# Patient Record
Sex: FEMALE | Race: BLACK OR AFRICAN AMERICAN | NOT HISPANIC OR LATINO | Employment: UNEMPLOYED | URBAN - METROPOLITAN AREA
[De-identification: names, ages, dates, MRNs, and addresses within clinical notes are randomized per-mention and may not be internally consistent; named-entity substitution may affect disease eponyms.]

---

## 2022-05-16 ENCOUNTER — HOSPITAL ENCOUNTER (INPATIENT)
Facility: HOSPITAL | Age: 47
LOS: 2 days | Discharge: HOME | DRG: 291 | End: 2022-05-19
Attending: EMERGENCY MEDICINE | Admitting: INTERNAL MEDICINE
Payer: MEDICARE

## 2022-05-16 DIAGNOSIS — I25.10 ASCVD (ARTERIOSCLEROTIC CARDIOVASCULAR DISEASE): ICD-10-CM

## 2022-05-16 DIAGNOSIS — R06.02 SHORTNESS OF BREATH: ICD-10-CM

## 2022-05-16 DIAGNOSIS — I50.9 CHF (CONGESTIVE HEART FAILURE): ICD-10-CM

## 2022-05-16 DIAGNOSIS — R07.9 CHEST PAIN: ICD-10-CM

## 2022-05-16 LAB
ALBUMIN SERPL BCP-MCNC: 2.3 G/DL (ref 3.5–5.2)
ALP SERPL-CCNC: 70 U/L (ref 55–135)
ALT SERPL W/O P-5'-P-CCNC: 13 U/L (ref 10–44)
ANION GAP SERPL CALC-SCNC: 7 MMOL/L (ref 8–16)
AST SERPL-CCNC: 16 U/L (ref 10–40)
BASOPHILS # BLD AUTO: 0.08 K/UL (ref 0–0.2)
BASOPHILS NFR BLD: 1.2 % (ref 0–1.9)
BILIRUB SERPL-MCNC: 0.3 MG/DL (ref 0.1–1)
BNP SERPL-MCNC: 194 PG/ML (ref 0–99)
BUN SERPL-MCNC: 10 MG/DL (ref 6–20)
CALCIUM SERPL-MCNC: 8.5 MG/DL (ref 8.7–10.5)
CHLORIDE SERPL-SCNC: 107 MMOL/L (ref 95–110)
CO2 SERPL-SCNC: 25 MMOL/L (ref 23–29)
CREAT SERPL-MCNC: 0.8 MG/DL (ref 0.5–1.4)
D DIMER PPP IA.FEU-MCNC: 0.74 MG/L FEU
DIFFERENTIAL METHOD: ABNORMAL
EOSINOPHIL # BLD AUTO: 0.1 K/UL (ref 0–0.5)
EOSINOPHIL NFR BLD: 2.1 % (ref 0–8)
ERYTHROCYTE [DISTWIDTH] IN BLOOD BY AUTOMATED COUNT: 12 % (ref 11.5–14.5)
EST. GFR  (AFRICAN AMERICAN): >60 ML/MIN/1.73 M^2
EST. GFR  (NON AFRICAN AMERICAN): >60 ML/MIN/1.73 M^2
GLUCOSE SERPL-MCNC: 198 MG/DL (ref 70–110)
HCT VFR BLD AUTO: 35.4 % (ref 37–48.5)
HGB BLD-MCNC: 11.9 G/DL (ref 12–16)
IMM GRANULOCYTES # BLD AUTO: 0.02 K/UL (ref 0–0.04)
IMM GRANULOCYTES NFR BLD AUTO: 0.3 % (ref 0–0.5)
LYMPHOCYTES # BLD AUTO: 3.2 K/UL (ref 1–4.8)
LYMPHOCYTES NFR BLD: 48.3 % (ref 18–48)
MCH RBC QN AUTO: 29.9 PG (ref 27–31)
MCHC RBC AUTO-ENTMCNC: 33.6 G/DL (ref 32–36)
MCV RBC AUTO: 89 FL (ref 82–98)
MONOCYTES # BLD AUTO: 0.4 K/UL (ref 0.3–1)
MONOCYTES NFR BLD: 5.7 % (ref 4–15)
NEUTROPHILS # BLD AUTO: 2.8 K/UL (ref 1.8–7.7)
NEUTROPHILS NFR BLD: 42.4 % (ref 38–73)
NRBC BLD-RTO: 0 /100 WBC
PLATELET # BLD AUTO: 283 K/UL (ref 150–450)
PMV BLD AUTO: 10.8 FL (ref 9.2–12.9)
POCT GLUCOSE: 144 MG/DL (ref 70–110)
POTASSIUM SERPL-SCNC: 3.5 MMOL/L (ref 3.5–5.1)
PROT SERPL-MCNC: 5.7 G/DL (ref 6–8.4)
RBC # BLD AUTO: 3.98 M/UL (ref 4–5.4)
SARS-COV-2 RDRP RESP QL NAA+PROBE: NEGATIVE
SODIUM SERPL-SCNC: 139 MMOL/L (ref 136–145)
TROPONIN I SERPL DL<=0.01 NG/ML-MCNC: 0.01 NG/ML (ref 0–0.03)
TROPONIN I SERPL DL<=0.01 NG/ML-MCNC: 0.01 NG/ML (ref 0–0.03)
WBC # BLD AUTO: 6.64 K/UL (ref 3.9–12.7)

## 2022-05-16 PROCEDURE — 96375 TX/PRO/DX INJ NEW DRUG ADDON: CPT

## 2022-05-16 PROCEDURE — 82962 GLUCOSE BLOOD TEST: CPT

## 2022-05-16 PROCEDURE — G0378 HOSPITAL OBSERVATION PER HR: HCPCS

## 2022-05-16 PROCEDURE — 25000003 PHARM REV CODE 250: Performed by: EMERGENCY MEDICINE

## 2022-05-16 PROCEDURE — 36415 COLL VENOUS BLD VENIPUNCTURE: CPT | Performed by: INTERNAL MEDICINE

## 2022-05-16 PROCEDURE — U0002 COVID-19 LAB TEST NON-CDC: HCPCS | Performed by: EMERGENCY MEDICINE

## 2022-05-16 PROCEDURE — 93010 EKG 12-LEAD: ICD-10-PCS | Mod: ,,, | Performed by: GENERAL PRACTICE

## 2022-05-16 PROCEDURE — 96372 THER/PROPH/DIAG INJ SC/IM: CPT | Performed by: INTERNAL MEDICINE

## 2022-05-16 PROCEDURE — 25000003 PHARM REV CODE 250: Performed by: INTERNAL MEDICINE

## 2022-05-16 PROCEDURE — 80053 COMPREHEN METABOLIC PANEL: CPT | Performed by: NURSE PRACTITIONER

## 2022-05-16 PROCEDURE — 83880 ASSAY OF NATRIURETIC PEPTIDE: CPT | Performed by: NURSE PRACTITIONER

## 2022-05-16 PROCEDURE — 63600175 PHARM REV CODE 636 W HCPCS: Performed by: INTERNAL MEDICINE

## 2022-05-16 PROCEDURE — 84484 ASSAY OF TROPONIN QUANT: CPT | Performed by: NURSE PRACTITIONER

## 2022-05-16 PROCEDURE — 84484 ASSAY OF TROPONIN QUANT: CPT | Mod: 91 | Performed by: INTERNAL MEDICINE

## 2022-05-16 PROCEDURE — 96366 THER/PROPH/DIAG IV INF ADDON: CPT

## 2022-05-16 PROCEDURE — 93005 ELECTROCARDIOGRAM TRACING: CPT

## 2022-05-16 PROCEDURE — 99285 EMERGENCY DEPT VISIT HI MDM: CPT | Mod: 25

## 2022-05-16 PROCEDURE — 85025 COMPLETE CBC W/AUTO DIFF WBC: CPT | Performed by: NURSE PRACTITIONER

## 2022-05-16 PROCEDURE — 85379 FIBRIN DEGRADATION QUANT: CPT | Performed by: INTERNAL MEDICINE

## 2022-05-16 PROCEDURE — 96365 THER/PROPH/DIAG IV INF INIT: CPT

## 2022-05-16 PROCEDURE — 93010 ELECTROCARDIOGRAM REPORT: CPT | Mod: ,,, | Performed by: GENERAL PRACTICE

## 2022-05-16 RX ORDER — ATORVASTATIN CALCIUM 40 MG/1
80 TABLET, FILM COATED ORAL NIGHTLY
Status: DISCONTINUED | OUTPATIENT
Start: 2022-05-16 | End: 2022-05-19 | Stop reason: HOSPADM

## 2022-05-16 RX ORDER — ALPRAZOLAM 1 MG/1
1 TABLET ORAL 3 TIMES DAILY PRN
COMMUNITY
Start: 2021-12-10

## 2022-05-16 RX ORDER — ENOXAPARIN SODIUM 100 MG/ML
40 INJECTION SUBCUTANEOUS EVERY 24 HOURS
Status: DISCONTINUED | OUTPATIENT
Start: 2022-05-16 | End: 2022-05-19 | Stop reason: HOSPADM

## 2022-05-16 RX ORDER — POTASSIUM CHLORIDE 20 MEQ/1
60 TABLET, EXTENDED RELEASE ORAL ONCE
Status: COMPLETED | OUTPATIENT
Start: 2022-05-16 | End: 2022-05-16

## 2022-05-16 RX ORDER — POTASSIUM CHLORIDE 750 MG/1
10 CAPSULE, EXTENDED RELEASE ORAL DAILY
Status: ON HOLD | COMMUNITY
Start: 2022-04-13 | End: 2022-05-19 | Stop reason: HOSPADM

## 2022-05-16 RX ORDER — PREDNISOLONE ACETATE 10 MG/ML
1 SUSPENSION/ DROPS OPHTHALMIC 4 TIMES DAILY
COMMUNITY
Start: 2022-04-13

## 2022-05-16 RX ORDER — OXYCODONE HYDROCHLORIDE 5 MG/1
5 TABLET ORAL EVERY 4 HOURS PRN
Status: DISCONTINUED | OUTPATIENT
Start: 2022-05-16 | End: 2022-05-19 | Stop reason: HOSPADM

## 2022-05-16 RX ORDER — GLIPIZIDE 10 MG/1
10 TABLET ORAL 2 TIMES DAILY
COMMUNITY
Start: 2022-05-16

## 2022-05-16 RX ORDER — PANTOPRAZOLE SODIUM 40 MG/1
40 TABLET, DELAYED RELEASE ORAL DAILY
COMMUNITY
Start: 2022-05-15

## 2022-05-16 RX ORDER — OXCARBAZEPINE 150 MG/1
150 TABLET, FILM COATED ORAL 2 TIMES DAILY
COMMUNITY
Start: 2022-05-09

## 2022-05-16 RX ORDER — VALSARTAN 40 MG/1
160 TABLET ORAL 2 TIMES DAILY
Status: DISCONTINUED | OUTPATIENT
Start: 2022-05-16 | End: 2022-05-19 | Stop reason: HOSPADM

## 2022-05-16 RX ORDER — IBUPROFEN 200 MG
16 TABLET ORAL
Status: DISCONTINUED | OUTPATIENT
Start: 2022-05-16 | End: 2022-05-19 | Stop reason: HOSPADM

## 2022-05-16 RX ORDER — LABETALOL HYDROCHLORIDE 5 MG/ML
20 INJECTION, SOLUTION INTRAVENOUS
Status: COMPLETED | OUTPATIENT
Start: 2022-05-16 | End: 2022-05-16

## 2022-05-16 RX ORDER — FUROSEMIDE 20 MG/1
20 TABLET ORAL EVERY MORNING
Status: ON HOLD | COMMUNITY
Start: 2022-04-13 | End: 2022-05-19 | Stop reason: HOSPADM

## 2022-05-16 RX ORDER — CLONIDINE HYDROCHLORIDE 0.1 MG/1
0.1 TABLET ORAL
Status: COMPLETED | OUTPATIENT
Start: 2022-05-16 | End: 2022-05-16

## 2022-05-16 RX ORDER — GLUCAGON 1 MG
1 KIT INJECTION
Status: DISCONTINUED | OUTPATIENT
Start: 2022-05-16 | End: 2022-05-19 | Stop reason: HOSPADM

## 2022-05-16 RX ORDER — NALOXONE HCL 0.4 MG/ML
0.02 VIAL (ML) INJECTION
Status: DISCONTINUED | OUTPATIENT
Start: 2022-05-16 | End: 2022-05-19 | Stop reason: HOSPADM

## 2022-05-16 RX ORDER — QUETIAPINE FUMARATE 25 MG/1
25 TABLET, FILM COATED ORAL NIGHTLY
COMMUNITY
Start: 2022-05-09

## 2022-05-16 RX ORDER — FUROSEMIDE 10 MG/ML
40 INJECTION INTRAMUSCULAR; INTRAVENOUS ONCE
Status: COMPLETED | OUTPATIENT
Start: 2022-05-16 | End: 2022-05-16

## 2022-05-16 RX ORDER — BRIMONIDINE TARTRATE, TIMOLOL MALEATE 2; 5 MG/ML; MG/ML
SOLUTION/ DROPS OPHTHALMIC
COMMUNITY
Start: 2021-12-23

## 2022-05-16 RX ORDER — IBUPROFEN 200 MG
24 TABLET ORAL
Status: DISCONTINUED | OUTPATIENT
Start: 2022-05-16 | End: 2022-05-19 | Stop reason: HOSPADM

## 2022-05-16 RX ORDER — LAMOTRIGINE 50 MG/1
1 TABLET, EXTENDED RELEASE ORAL DAILY
COMMUNITY
Start: 2022-04-13

## 2022-05-16 RX ORDER — CLONIDINE HYDROCHLORIDE 0.1 MG/1
0.1 TABLET ORAL 2 TIMES DAILY
Status: ON HOLD | COMMUNITY
Start: 2022-04-13 | End: 2022-05-19 | Stop reason: HOSPADM

## 2022-05-16 RX ORDER — AMLODIPINE BESYLATE 5 MG/1
10 TABLET ORAL DAILY
Status: DISCONTINUED | OUTPATIENT
Start: 2022-05-17 | End: 2022-05-17

## 2022-05-16 RX ORDER — INSULIN DEGLUDEC 100 U/ML
INJECTION, SOLUTION SUBCUTANEOUS
COMMUNITY
Start: 2022-05-14

## 2022-05-16 RX ORDER — OMEPRAZOLE 20 MG/1
20 CAPSULE, DELAYED RELEASE ORAL DAILY
COMMUNITY
Start: 2022-03-01

## 2022-05-16 RX ORDER — MORPHINE SULFATE 4 MG/ML
3 INJECTION, SOLUTION INTRAMUSCULAR; INTRAVENOUS EVERY 4 HOURS PRN
Status: DISCONTINUED | OUTPATIENT
Start: 2022-05-16 | End: 2022-05-19 | Stop reason: HOSPADM

## 2022-05-16 RX ORDER — POLYETHYLENE GLYCOL 3350 17 G/17G
17 POWDER, FOR SOLUTION ORAL 2 TIMES DAILY PRN
Status: DISCONTINUED | OUTPATIENT
Start: 2022-05-16 | End: 2022-05-19 | Stop reason: HOSPADM

## 2022-05-16 RX ORDER — INSULIN ASPART 100 [IU]/ML
0-5 INJECTION, SOLUTION INTRAVENOUS; SUBCUTANEOUS
Status: DISCONTINUED | OUTPATIENT
Start: 2022-05-16 | End: 2022-05-19 | Stop reason: HOSPADM

## 2022-05-16 RX ORDER — CHOLECALCIFEROL (VITAMIN D3) 25 MCG
1000 TABLET ORAL DAILY
COMMUNITY
Start: 2022-03-01

## 2022-05-16 RX ORDER — BUSPIRONE HYDROCHLORIDE 7.5 MG/1
7.5 TABLET ORAL 3 TIMES DAILY
COMMUNITY
Start: 2022-05-09

## 2022-05-16 RX ORDER — NAPROXEN SODIUM 220 MG/1
324 TABLET, FILM COATED ORAL DAILY
Status: DISCONTINUED | OUTPATIENT
Start: 2022-05-16 | End: 2022-05-17

## 2022-05-16 RX ORDER — ACETAMINOPHEN 325 MG/1
650 TABLET ORAL EVERY 6 HOURS PRN
Status: DISCONTINUED | OUTPATIENT
Start: 2022-05-17 | End: 2022-05-19 | Stop reason: HOSPADM

## 2022-05-16 RX ORDER — LOSARTAN POTASSIUM 50 MG/1
100 TABLET ORAL DAILY
COMMUNITY
Start: 2022-03-01 | End: 2022-05-19

## 2022-05-16 RX ORDER — PRAVASTATIN SODIUM 20 MG/1
20 TABLET ORAL DAILY
COMMUNITY
Start: 2022-05-15 | End: 2022-05-19

## 2022-05-16 RX ORDER — AMLODIPINE BESYLATE 5 MG/1
5 TABLET ORAL DAILY
Status: ON HOLD | COMMUNITY
Start: 2022-05-15 | End: 2022-05-19 | Stop reason: HOSPADM

## 2022-05-16 RX ORDER — SITAGLIPTIN AND METFORMIN HYDROCHLORIDE 50; 500 MG/1; MG/1
1 TABLET, FILM COATED, EXTENDED RELEASE ORAL DAILY
Status: ON HOLD | COMMUNITY
Start: 2022-04-13 | End: 2022-05-19 | Stop reason: HOSPADM

## 2022-05-16 RX ORDER — NICARDIPINE HYDROCHLORIDE 0.2 MG/ML
0-15 INJECTION INTRAVENOUS CONTINUOUS
Status: DISCONTINUED | OUTPATIENT
Start: 2022-05-16 | End: 2022-05-18

## 2022-05-16 RX ADMIN — ENOXAPARIN SODIUM 40 MG: 100 INJECTION SUBCUTANEOUS at 06:05

## 2022-05-16 RX ADMIN — NICARDIPINE HYDROCHLORIDE 2.5 MG/HR: 0.2 INJECTION, SOLUTION INTRAVENOUS at 08:05

## 2022-05-16 RX ADMIN — ASPIRIN 81 MG CHEWABLE TABLET 324 MG: 81 TABLET CHEWABLE at 06:05

## 2022-05-16 RX ADMIN — ATORVASTATIN CALCIUM 80 MG: 40 TABLET, FILM COATED ORAL at 06:05

## 2022-05-16 RX ADMIN — LABETALOL HYDROCHLORIDE 20 MG: 5 INJECTION INTRAVENOUS at 05:05

## 2022-05-16 RX ADMIN — POTASSIUM CHLORIDE 60 MEQ: 1500 TABLET, EXTENDED RELEASE ORAL at 06:05

## 2022-05-16 RX ADMIN — FUROSEMIDE 40 MG: 10 INJECTION, SOLUTION INTRAMUSCULAR; INTRAVENOUS at 07:05

## 2022-05-16 RX ADMIN — VALSARTAN 160 MG: 80 TABLET, FILM COATED ORAL at 08:05

## 2022-05-16 RX ADMIN — CLONIDINE HYDROCHLORIDE 0.1 MG: 0.1 TABLET ORAL at 05:05

## 2022-05-16 NOTE — FIRST PROVIDER EVALUATION
Emergency Department TeleTriage Encounter Note      CHIEF COMPLAINT    Chief Complaint   Patient presents with    Leg Swelling     Arms tingling  / hand numbness started today        VITAL SIGNS   Initial Vitals [05/16/22 1529]   BP Pulse Resp Temp SpO2   (!) 222/99 79 18 99 °F (37.2 °C) 99 %      MAP       --            ALLERGIES    Review of patient's allergies indicates:   Allergen Reactions    Vancomycin analogues        PROVIDER TRIAGE NOTE  This is a teletriage evaluation of a 46 y.o. female presenting to the ED with c/o numbness and tingling in hands and fingers since this morning.  Swelling to BLE.  Pt states symptoms began a week ago.  Pt is compliant with her lasix.      PE: hypertensive on exam.  Steady gait noted.  Speaking in full sentences.     Plan: labs, imaging    All ED beds are full at present; patient notified of this status.  Patient seen and medically screened by Nurse Practitioner via teletriage.  Limited physical exam via telehealth: The patient is awake, alert, answering questions appropriately and is not in respiratory distress. Initial orders will be placed and care will be transferred to an alternate provider when patient is roomed for a full evaluation. Any additional orders and the final disposition will be determined by that provider.        ORDERS  Labs Reviewed   CBC W/ AUTO DIFFERENTIAL   COMPREHENSIVE METABOLIC PANEL   TROPONIN I   B-TYPE NATRIURETIC PEPTIDE       ED Orders (720h ago, onward)    Start Ordered     Status Ordering Provider    05/16/22 1536 05/16/22 1535  X-Ray Chest 1 View  1 time imaging         Ordered KRYSTLE REDD    05/16/22 1535 05/16/22 1534  Vital signs  Every 30 min         Ordered KRYSTLE REDD    05/16/22 1535 05/16/22 1534  Cardiac Monitoring - Adult  Continuous        Comments: Notify Physician If:    Ordered KRYSTLE REDD    05/16/22 1535 05/16/22 1534  Pulse Oximetry Continuous  Continuous         Ordered KRYSTLE REDD    05/16/22 1535  05/16/22 1534  Insert peripheral IV  Once         Ordered KRYSTLE REDDKala    05/16/22 1535 05/16/22 1534  CBC auto differential  STAT         Pending Collection KRYSTLE REDDKala    05/16/22 1535 05/16/22 1534  Comprehensive metabolic panel  STAT         Pending Collection KRYSTLE REDDKala    05/16/22 1535 05/16/22 1534  Troponin I  STAT         Pending Collection KRYSTLE REDDKala    05/16/22 1535 05/16/22 1534  Brain natriuretic peptide  STAT         Pending Collection KRYSTLE REDDKala    05/16/22 1535 05/16/22 1534  EKG 12-lead  Once         Ordered IVANIA KRYSTLE L.            Virtual Visit Note: The provider triage portion of this emergency department evaluation and documentation was performed via [employer], a HIPAA-compliant telemedicine application, in concert with a tele-presenter in the room. A face to face patient evaluation with one of my colleagues will occur once the patient is placed in an emergency department room.      DISCLAIMER: This note was prepared with [employer] voice recognition transcription software. Garbled syntax, mangled pronouns, and other bizarre constructions may be attributed to that software system.

## 2022-05-16 NOTE — H&P
History and Physical for Admission  [facility]      Mayra Davis (MRN: [id number])  Admitting Service: Hospital Medicine  Date of Admission: 5/16/2022   Primary Care Provider: No primary care provider on file.    ?  Chief complaint: chest pain   ?  History of Present Illness:     46-year-old female with history of CHF, diabetes presents with 1 week of worsening lower extremity edema ultimately developing chest pain on the day of admission found to be in decompensated heart failure with severely elevated blood pressure.    For 1 week the patient has been experiencing worsening lower extremity edema which she feels is worse on the right.  She has been compliant with her home medication regimen and her Lasix dose have not recently been changed.  On the day of admission she developed moderately severe substernal chest pain leading to her presentation.  No palpitations or lightheadedness.  No shortness of breath, fever or cough.  ?  ?  Review of Systems:   Constitutional: ; no fevers/chills, night sweats, weight changes, fatigue, recent illnesses,   Eyes: ; denies vision changes including recent decrease in vision, double vision or blurriness  Ears, Nose, Mouth, Throat: denies hearing abnormalities, tinnitus, rhinorrhea, sinus pain/congestion, dysphagia, sore throat, hoarseness, neck pain  Cardiovascular:  See HPI  Respiratory: ; denies dyspnea, cough, wheezing  GI: denies abdominal pain, nausea, vomiting, diarrhea, constipation, melena,   :  denies dysuria, hematuria, polyuria, incontinence  MSK: denies joint pain, weakness, myalgias  Integument:  denies rashes, lesions, skin changes  Neuro:  Headache denies weakness, paresthesia, tremor, dizziness  Psych: denies history of anxiety/depression  Endocrine: ; denies polyuria/polydipsia, polyphagia, heat/cold intolerance,  Hematologic/lymphatic:  denies easy bleeding/bruising, LAD    ?  Medical History  CHF, diabetes, hypertension, eye problems  Prior  "surgeries:  Hysterectomy,   Does not smoke drink or do drugs  Mother with history of cardiac disease  Allergic to vancomycin  ?  CURRENT OUTPATIENT MEDS  Allergies:   Review of patient's allergies indicates:   Allergen Reactions    Vancomycin analogues        ?  PHYSICAL EXAM  Vitals: BP (!) 216/94   Pulse 82   Temp 99 °F (37.2 °C) (Oral)   Resp 18   Ht 5' 4" (1.626 m)   Wt 77 kg (169 lb 12.1 oz)   SpO2 100%   BMI 29.14 kg/m²  Body mass index is 29.14 kg/m².    General: NAD, AO3  HEENT: PERRL, EOMI.   Cardiovascular: RRR  Respiratory:crackles at bases   GI: abdomen S/NT/ND, +BS  Extremities: 1+ edema on left, 2+ on right  MSK: moves upper extremities.   Neuro/Psych: A&OX3. CNII-XII grossly intact.      EKG and radiographs from the past 24h: reviewed and personally interpreted if available.      LABS    Recent Labs     22  1603   WBC 6.64   HGB 11.9*        ?  Recent Labs     22  1603      K 3.5   CO2 25   BUN 10   CREATININE 0.8     ?  Recent Labs     22  1603   BILITOT 0.3   AST 16   ALT 13   ALKPHOS 70   PROT 5.7*     ?  No results for input(s): INR, PT, PTT in the last 72 hours.  ?    ASSESSMENT & PLAN    46-year-old female with history of CHF, diabetes presents with 1 week of worsening lower extremity edema ultimately developing chest pain on the day of admission found to be in decompensated heart failure with severely elevated blood pressure  ?    1. Acute on chronic congestive heart failure  -type unknown  -Lasix  -echo    2. Chest pain  -likely secondary to severe hypertension  -trend troponins  -ASA, statin, prn morphine. Beta blocker currently contraindicated.     3. HTN:   -nicardipine drip  -valsartan 180 bid (home losartan)   -Continue home amlodipine in AM    4. DMII  -ISS  -AM A1C    DVT ppx: lovenox     Highly complex medical decision making, case discussed with ER physician, EKG reviewed.    Code Status/Dispo: Full Code.   ?  Nick Ocampo    Date: " 5/16/2022

## 2022-05-16 NOTE — ED NOTES
Dr. Ocampo @BS discussing POC and Admission. Pt  with pt. Pt AAOx4, Abc's intact. NADN. No adverse reaction to medication given.

## 2022-05-16 NOTE — ED PROVIDER NOTES
Encounter Date: 5/16/2022       History     Chief Complaint   Patient presents with    Leg Swelling     Arms tingling  / hand numbness started today      46-year-old female with a history of CHF hypertension diabetes on multiple medications presents with chest discomfort that began prior to arrival.  She also reports 1 week of leg swelling and bilateral tingling in the arms.  History of prior MI in 2009. She is compliant with all of her medications including losartan clonidine and Lasix.  Denies shortness of breath.  She reports the swelling extends from her ankles all the way to her abdomen.  No history of such significant swelling.    The history is provided by the patient.     Review of patient's allergies indicates:   Allergen Reactions    Vancomycin analogues      No past medical history on file.  No past surgical history on file.  No family history on file.     Review of Systems   Respiratory: Negative for shortness of breath.    Cardiovascular: Positive for chest pain and leg swelling.   All other systems reviewed and are negative.      Physical Exam     Initial Vitals [05/16/22 1529]   BP Pulse Resp Temp SpO2   (!) 222/99 79 18 99 °F (37.2 °C) 99 %      MAP       --         Physical Exam    Nursing note and vitals reviewed.  Constitutional: She appears well-developed and well-nourished. She is not diaphoretic. No distress.   HENT:   Head: Normocephalic and atraumatic.   Eyes: EOM are normal.   Neck: Neck supple.   Normal range of motion.  Cardiovascular: Normal rate, regular rhythm and normal heart sounds. Exam reveals no gallop and no friction rub.    No murmur heard.  Pulmonary/Chest: Breath sounds normal. No respiratory distress. She has no wheezes. She has no rhonchi. She has no rales.   Abdominal: Abdomen is soft. She exhibits no distension. There is no abdominal tenderness.   Musculoskeletal:         General: Normal range of motion.      Cervical back: Normal range of motion and neck supple.       Comments: 2+ edema lower extremities, tense and swollen     Neurological: She is alert and oriented to person, place, and time.   Skin: Skin is warm and dry.   Psychiatric: She has a normal mood and affect. Her behavior is normal. Judgment and thought content normal.         ED Course   Procedures  Labs Reviewed   CBC W/ AUTO DIFFERENTIAL - Abnormal; Notable for the following components:       Result Value    RBC 3.98 (*)     Hemoglobin 11.9 (*)     Hematocrit 35.4 (*)     Lymph % 48.3 (*)     All other components within normal limits   COMPREHENSIVE METABOLIC PANEL   TROPONIN I   B-TYPE NATRIURETIC PEPTIDE   SARS-COV-2 RNA AMPLIFICATION, QUAL          Imaging Results          X-Ray Chest 1 View (Final result)  Result time 05/16/22 16:03:16    Final result by Inocencia Diaz MD (05/16/22 16:03:16)                 Impression:      No acute cardiopulmonary disease      Electronically signed by: Inocencia Diaz MD  Date:    05/16/2022  Time:    16:03             Narrative:    EXAMINATION:  XR CHEST 1 VIEW    CLINICAL HISTORY:  Heart failure, unspecified    TECHNIQUE:  Single frontal view of the chest was performed.    COMPARISON:  11/26/2014    FINDINGS:  The cardiomediastinal silhouette is stable.  The lungs are clear of infiltrate.  There is no pleural effusion.                                 Medications   labetaloL injection 20 mg (has no administration in time range)     Medical Decision Making:   History:   Old Medical Records: I decided to obtain old medical records.  Old Records Summarized: records from clinic visits.       <> Summary of Records: Can see a few [place] records from 2015  Clinical Tests:   Lab Tests: Ordered and Reviewed  Radiological Study: Ordered and Reviewed  Medical Tests: Ordered and Reviewed             ED Course as of 05/16/22 1821   Mon May 16, 2022   1641 X-Ray Chest 1 View [EF]   1641 BP(!): 222/99 [EF]   1641 Temp: 99 °F (37.2 °C) [EF]   1641 Temp src: Oral [EF]   1641 Pulse: 79  [EF]   1641 Resp: 18 [EF]   1641 SpO2: 99 % [EF]   1650 Sinus rhythm 74 beats per minute normal axis no ST elevation.  LVH.  Poor R-wave progression independently interpreted no old EKGs. [EF]   1708 Troponin I: 0.015 [EF]   1708 BNP(!): 194 [EF]   1724 BP(!): 214/92 [EF]   1738 Dickert to admit for CP, leg swelling, HTN [EF]      ED Course User Index  [EF] Clark Rosenberg MD             Clinical Impression:   Final diagnoses:  [R06.02] Shortness of breath  [I50.9] CHF (congestive heart failure)          46-year-old female history of diabetes hypertension CHF possible MI presents with chest pressure earlier today.  Also complaining of 1 week of lower extremity edema which extends up to her hips.  Mild shortness of breath.  Do not think pulmonary embolus or aortic dissection.  BNP slightly elevated.  Patient was given IV labetalol in the emergency room for blood pressure with improvement.  [facility] to admit.        Clark Rosenberg MD  05/16/22 4385

## 2022-05-17 LAB
ANION GAP SERPL CALC-SCNC: 10 MMOL/L (ref 8–16)
BASOPHILS # BLD AUTO: 0.07 K/UL (ref 0–0.2)
BASOPHILS NFR BLD: 1.1 % (ref 0–1.9)
BUN SERPL-MCNC: 11 MG/DL (ref 6–20)
CALCIUM SERPL-MCNC: 8.4 MG/DL (ref 8.7–10.5)
CHLORIDE SERPL-SCNC: 108 MMOL/L (ref 95–110)
CO2 SERPL-SCNC: 24 MMOL/L (ref 23–29)
CREAT SERPL-MCNC: 0.9 MG/DL (ref 0.5–1.4)
DIFFERENTIAL METHOD: ABNORMAL
EOSINOPHIL # BLD AUTO: 0.1 K/UL (ref 0–0.5)
EOSINOPHIL NFR BLD: 1.6 % (ref 0–8)
ERYTHROCYTE [DISTWIDTH] IN BLOOD BY AUTOMATED COUNT: 11.9 % (ref 11.5–14.5)
EST. GFR  (AFRICAN AMERICAN): >60 ML/MIN/1.73 M^2
EST. GFR  (NON AFRICAN AMERICAN): >60 ML/MIN/1.73 M^2
ESTIMATED AVG GLUCOSE: 189 MG/DL (ref 68–131)
GLUCOSE SERPL-MCNC: 245 MG/DL (ref 70–110)
HBA1C MFR BLD: 8.2 % (ref 4–5.6)
HCT VFR BLD AUTO: 31.5 % (ref 37–48.5)
HGB BLD-MCNC: 10.4 G/DL (ref 12–16)
IMM GRANULOCYTES # BLD AUTO: 0.01 K/UL (ref 0–0.04)
IMM GRANULOCYTES NFR BLD AUTO: 0.2 % (ref 0–0.5)
LYMPHOCYTES # BLD AUTO: 2.9 K/UL (ref 1–4.8)
LYMPHOCYTES NFR BLD: 46.1 % (ref 18–48)
MAGNESIUM SERPL-MCNC: 1.5 MG/DL (ref 1.6–2.6)
MCH RBC QN AUTO: 29.4 PG (ref 27–31)
MCHC RBC AUTO-ENTMCNC: 33 G/DL (ref 32–36)
MCV RBC AUTO: 89 FL (ref 82–98)
MONOCYTES # BLD AUTO: 0.4 K/UL (ref 0.3–1)
MONOCYTES NFR BLD: 5.6 % (ref 4–15)
NEUTROPHILS # BLD AUTO: 2.9 K/UL (ref 1.8–7.7)
NEUTROPHILS NFR BLD: 45.4 % (ref 38–73)
NRBC BLD-RTO: 0 /100 WBC
PLATELET # BLD AUTO: 235 K/UL (ref 150–450)
PMV BLD AUTO: 11.1 FL (ref 9.2–12.9)
POCT GLUCOSE: 187 MG/DL (ref 70–110)
POCT GLUCOSE: 291 MG/DL (ref 70–110)
POCT GLUCOSE: 308 MG/DL (ref 70–110)
POTASSIUM SERPL-SCNC: 3.4 MMOL/L (ref 3.5–5.1)
RBC # BLD AUTO: 3.54 M/UL (ref 4–5.4)
SODIUM SERPL-SCNC: 142 MMOL/L (ref 136–145)
TROPONIN I SERPL DL<=0.01 NG/ML-MCNC: 0.02 NG/ML (ref 0–0.03)
TROPONIN I SERPL DL<=0.01 NG/ML-MCNC: 0.02 NG/ML (ref 0–0.03)
TROPONIN I SERPL DL<=0.01 NG/ML-MCNC: 0.03 NG/ML (ref 0–0.03)
WBC # BLD AUTO: 6.29 K/UL (ref 3.9–12.7)

## 2022-05-17 PROCEDURE — 83036 HEMOGLOBIN GLYCOSYLATED A1C: CPT | Performed by: INTERNAL MEDICINE

## 2022-05-17 PROCEDURE — 20000000 HC ICU ROOM

## 2022-05-17 PROCEDURE — 96366 THER/PROPH/DIAG IV INF ADDON: CPT

## 2022-05-17 PROCEDURE — 84484 ASSAY OF TROPONIN QUANT: CPT | Mod: 91 | Performed by: INTERNAL MEDICINE

## 2022-05-17 PROCEDURE — 25000003 PHARM REV CODE 250: Performed by: INTERNAL MEDICINE

## 2022-05-17 PROCEDURE — 85025 COMPLETE CBC W/AUTO DIFF WBC: CPT | Performed by: INTERNAL MEDICINE

## 2022-05-17 PROCEDURE — 80048 BASIC METABOLIC PNL TOTAL CA: CPT | Performed by: INTERNAL MEDICINE

## 2022-05-17 PROCEDURE — 94761 N-INVAS EAR/PLS OXIMETRY MLT: CPT

## 2022-05-17 PROCEDURE — 83735 ASSAY OF MAGNESIUM: CPT | Performed by: INTERNAL MEDICINE

## 2022-05-17 PROCEDURE — 63600175 PHARM REV CODE 636 W HCPCS: Performed by: INTERNAL MEDICINE

## 2022-05-17 PROCEDURE — 36415 COLL VENOUS BLD VENIPUNCTURE: CPT | Performed by: INTERNAL MEDICINE

## 2022-05-17 RX ORDER — MUPIROCIN 20 MG/G
OINTMENT TOPICAL 2 TIMES DAILY
Status: DISCONTINUED | OUTPATIENT
Start: 2022-05-17 | End: 2022-05-19 | Stop reason: HOSPADM

## 2022-05-17 RX ORDER — FUROSEMIDE 10 MG/ML
40 INJECTION INTRAMUSCULAR; INTRAVENOUS EVERY 8 HOURS
Status: COMPLETED | OUTPATIENT
Start: 2022-05-17 | End: 2022-05-17

## 2022-05-17 RX ORDER — POTASSIUM CHLORIDE 20 MEQ/1
60 TABLET, EXTENDED RELEASE ORAL EVERY 6 HOURS
Status: COMPLETED | OUTPATIENT
Start: 2022-05-17 | End: 2022-05-17

## 2022-05-17 RX ORDER — MAGNESIUM SULFATE HEPTAHYDRATE 40 MG/ML
2 INJECTION, SOLUTION INTRAVENOUS ONCE
Status: COMPLETED | OUTPATIENT
Start: 2022-05-17 | End: 2022-05-17

## 2022-05-17 RX ORDER — LANOLIN ALCOHOL/MO/W.PET/CERES
400 CREAM (GRAM) TOPICAL 2 TIMES DAILY
Status: COMPLETED | OUTPATIENT
Start: 2022-05-17 | End: 2022-05-17

## 2022-05-17 RX ORDER — ISOSORBIDE MONONITRATE 30 MG/1
30 TABLET, EXTENDED RELEASE ORAL DAILY
Status: DISCONTINUED | OUTPATIENT
Start: 2022-05-17 | End: 2022-05-19

## 2022-05-17 RX ORDER — POTASSIUM CHLORIDE 20 MEQ/1
40 TABLET, EXTENDED RELEASE ORAL EVERY 6 HOURS
Status: DISCONTINUED | OUTPATIENT
Start: 2022-05-17 | End: 2022-05-17

## 2022-05-17 RX ORDER — ASPIRIN 81 MG/1
81 TABLET ORAL DAILY
Status: DISCONTINUED | OUTPATIENT
Start: 2022-05-18 | End: 2022-05-19 | Stop reason: HOSPADM

## 2022-05-17 RX ADMIN — INSULIN ASPART 4 UNITS: 100 INJECTION, SOLUTION INTRAVENOUS; SUBCUTANEOUS at 12:05

## 2022-05-17 RX ADMIN — INSULIN ASPART 3 UNITS: 100 INJECTION, SOLUTION INTRAVENOUS; SUBCUTANEOUS at 05:05

## 2022-05-17 RX ADMIN — ENOXAPARIN SODIUM 40 MG: 100 INJECTION SUBCUTANEOUS at 04:05

## 2022-05-17 RX ADMIN — MUPIROCIN: 20 OINTMENT TOPICAL at 08:05

## 2022-05-17 RX ADMIN — OXYCODONE 5 MG: 5 TABLET ORAL at 08:05

## 2022-05-17 RX ADMIN — OXYCODONE 5 MG: 5 TABLET ORAL at 02:05

## 2022-05-17 RX ADMIN — POTASSIUM CHLORIDE 60 MEQ: 1500 TABLET, EXTENDED RELEASE ORAL at 05:05

## 2022-05-17 RX ADMIN — POTASSIUM CHLORIDE 60 MEQ: 1500 TABLET, EXTENDED RELEASE ORAL at 11:05

## 2022-05-17 RX ADMIN — ACETAMINOPHEN 650 MG: 325 TABLET ORAL at 12:05

## 2022-05-17 RX ADMIN — ACETAMINOPHEN 650 MG: 325 TABLET ORAL at 09:05

## 2022-05-17 RX ADMIN — ASPIRIN 81 MG CHEWABLE TABLET 324 MG: 81 TABLET CHEWABLE at 08:05

## 2022-05-17 RX ADMIN — Medication 400 MG: at 10:05

## 2022-05-17 RX ADMIN — INSULIN ASPART 4 UNITS: 100 INJECTION, SOLUTION INTRAVENOUS; SUBCUTANEOUS at 11:05

## 2022-05-17 RX ADMIN — ISOSORBIDE MONONITRATE 30 MG: 30 TABLET, EXTENDED RELEASE ORAL at 03:05

## 2022-05-17 RX ADMIN — MUPIROCIN: 20 OINTMENT TOPICAL at 10:05

## 2022-05-17 RX ADMIN — Medication 400 MG: at 08:05

## 2022-05-17 RX ADMIN — VALSARTAN 160 MG: 80 TABLET, FILM COATED ORAL at 08:05

## 2022-05-17 RX ADMIN — OXYCODONE 5 MG: 5 TABLET ORAL at 11:05

## 2022-05-17 RX ADMIN — FUROSEMIDE 40 MG: 10 INJECTION, SOLUTION INTRAMUSCULAR; INTRAVENOUS at 10:05

## 2022-05-17 RX ADMIN — MAGNESIUM SULFATE 2 G: 2 INJECTION INTRAVENOUS at 10:05

## 2022-05-17 RX ADMIN — AMLODIPINE BESYLATE 10 MG: 5 TABLET ORAL at 08:05

## 2022-05-17 RX ADMIN — NICARDIPINE HYDROCHLORIDE 5 MG/HR: 0.2 INJECTION, SOLUTION INTRAVENOUS at 02:05

## 2022-05-17 RX ADMIN — ATORVASTATIN CALCIUM 80 MG: 40 TABLET, FILM COATED ORAL at 08:05

## 2022-05-17 NOTE — PLAN OF CARE
Problem: Adult Inpatient Plan of Care  Goal: Plan of Care Review  Outcome: Ongoing, Progressing  Goal: Patient-Specific Goal (Individualized)  Outcome: Ongoing, Progressing  Goal: Absence of Hospital-Acquired Illness or Injury  Outcome: Ongoing, Progressing  Goal: Optimal Comfort and Wellbeing  Outcome: Ongoing, Progressing  Goal: Readiness for Transition of Care  Outcome: Ongoing, Progressing     Problem: Heart Failure Comorbidity  Goal: Maintenance of Heart Failure Symptom Control  Outcome: Ongoing, Progressing     Problem: Hypertension Comorbidity  Goal: Blood Pressure in Desired Range  Outcome: Ongoing, Progressing

## 2022-05-17 NOTE — EICU
EICU BRIEF ADMIT NOTE:    HISTORY: Please refer to H/P and ER notes for detail. No chest pain at this time    CAMERA ASSESSMENT: Two way audiovisual assessment was done: no distress    Telemetry was reviewed. Medical records including notes, labs and imaging were reviewed.    DISCUSSED with bedside nurse.    ASSESSMENT AND PLAN:    #.  Congestive heart failure, increased pedal edema.  Doppler of the lower extremities negative for DVT.  Echocardiogram ordered.  Continue Lasix.    # Chest pain.  No acute ST changes on EKG.  First troponin normal.  Continue to trend troponin.  Continue aspirin and statin.    # Hypertension.  On nicardipine infusion.  Continue to titrate.    BEST PRACTICES REVIEW:    GLYCEMIN CONTROL:  SSI  STRESS ULCER PROPHYLAXIS: not indicated  DVT PROPHYLAXIS:   Lovenox    Thank You for allowing EICU to participate in the care of the patient. Please call as needed      Edmund Valencia MD  [facility]  [phone]

## 2022-05-17 NOTE — PROGRESS NOTES
"Daily Progress Note  [facility]      Mayra Davis (MRN: [id number])  Admitting Service: Hospital Medicine  Date of Admission: 5/16/2022   Primary Care Provider: No primary care provider on file.    ?  Chief complaint: chest pain   ?  History of Present Illness:     46-year-old female with history of CHF, diabetes presents with 1 week of worsening lower extremity edema ultimately developing chest pain on the day of admission found to be in decompensated heart failure with severely elevated blood pressure.    For 1 week the patient has been experiencing worsening lower extremity edema which she feels is worse on the right.  She has been compliant with her home medication regimen and her Lasix dose have not recently been changed.  On the day of admission she developed moderately severe substernal chest pain leading to her presentation.  No palpitations or lightheadedness.  No shortness of breath, fever or cough.  ?  ?Subjective:   Chest discomfort improving. Not short of breath. Still with headache   ?  PHYSICAL EXAM  Vitals: BP (!) 159/68   Pulse 83   Temp 98.4 °F (36.9 °C) (Axillary)   Resp (!) 0   Ht 5' 4" (1.626 m)   Wt 73.5 kg (162 lb)   SpO2 100%   Breastfeeding No Comment: hysterectomy  BMI 27.81 kg/m²  Body mass index is 27.81 kg/m².    General: NAD, AO3  HEENT: PERRL, EOMI.   Cardiovascular: RRR  Respiratory:ctab  GI: abdomen S/NT/ND, +BS  Extremities: trace edema   MSK: moves upper extremities.   Neuro/Psych: A&OX3. CNII-XII grossly intact.      EKG and radiographs from the past 24h: reviewed and personally interpreted if available.      LABS    Recent Labs     05/17/22  0328   WBC 6.29   HGB 10.4*        ?  Recent Labs     05/17/22  0328      K 3.4*   CO2 24   BUN 11   CREATININE 0.9   MG 1.5*     ?  Recent Labs     05/16/22  1603   BILITOT 0.3   AST 16   ALT 13   ALKPHOS 70   PROT 5.7*     ?  No results for input(s): INR, PT, PTT in the last 72 hours.  ?    ASSESSMENT & " PLAN    46-year-old female with history of CHF, diabetes presents with 1 week of worsening lower extremity edema ultimately developing chest pain on the day of admission found to be in decompensated heart failure with severely elevated blood pressure  ?    1. Acute on chronic congestive heart failure  -type unknown  -Lasix  -echo    2. Chest pain  -likely secondary to severe hypertension  -trend troponins  -ASA, statin, prn morphine. Beta blocker currently contraindicated 2/2 HF decompensation. Likely to add after stress test if test is  recommended by cardiology   -cardiology consult     3. HTN:   -nicardipine drip  -valsartan 180 bid (home losartan)   -imdur 30 started  -beta blocker likely after stress   -Previously intolerant to amlodipine     4. DMII  -ISS  -AM A1C    DVT ppx: lovenox       Code Status/Dispo: Full Code.   ?  Nick Ocampo    Date: 5/17/2022

## 2022-05-17 NOTE — ED NOTES
Report hand off given to NILE Allan in ICU. Pt will go via stretcher and Monitor with Nurse. She is Pt AAOx4, Abc's intact. NADN. No adverse reaction to medication given.

## 2022-05-17 NOTE — PLAN OF CARE
[facility]  Initial Discharge Assessment       Primary Care Provider: No primary care provider on file.    Admission Diagnosis: Shortness of breath [R06.02]  CHF (congestive heart failure) [I50.9]    Admission Date: 5/16/2022  Expected Discharge Date: PT confirmed her home address in [place] with her daughter Rachel Davis but she has been staying down here with her   Spouse- Axel Salomon [phone]. Pt denied HH but may request it at discharge. Pt does not have a PCP here but has a [employer]  that has assisted her in applying for medicaid waiver services due to being legally blind. Pt has DME related to being a diabetic such as insulin and glucometer. Pt uses [facility]. Pt has [employer] and Medicaid insurance. Pt takes aspirin daily. Pt stated that her spouse will provide transport home. CM following for additional needs.   Discharge Barriers Identified: None    Payor: [employer] MANAGED MEDICARE / Plan: [employer] SNP (SPECIAL NEEDS PLAN) / Product Type: Medicare Advantage /     Extended Emergency Contact Information  Primary Emergency Contact: SALOMON,AXEL  Mobile Phone: [phone]  Relation: Spouse  Preferred language: English   needed? No    Discharge Plan A: Home with family  Discharge Plan B: Home with family, Home Health      [id number] [place] - [address]  [address] [address]  Phone: [phone] Fax: [phone]      Initial Assessment (most recent)     Adult Discharge Assessment - 05/17/22 1006        Discharge Assessment    Assessment Type Discharge Planning Assessment     Confirmed/corrected address, phone number and insurance Yes     Confirmed Demographics Correct on Facesheet     Source of Information patient     Communicated TAI with patient/caregiver Yes     Reason For Admission SOB     Lives With spouse     Facility Arrived From: home     Do you expect to return to your current living situation? Yes     Do you have help at  home or someone to help you manage your care at home? Yes     Who are your caregiver(s) and their phone number(s)? Spouse- Axel Aime [phone]     Prior to hospitilization cognitive status: Alert/Oriented     Current cognitive status: Alert/Oriented     Walking or Climbing Stairs Difficulty none     Dressing/Bathing Difficulty none     Home Layout Able to live on 1st floor     Equipment Currently Used at Home glucometer     Readmission within 30 days? No     Patient currently being followed by outpatient case management? Yes     If yes, name of outpatient case management following: insurance company assigned oupatient case management     Do you currently have service(s) that help you manage your care at home? No     Do you take prescription medications? Yes     Do you have prescription coverage? Yes     Do you have any problems affording any of your prescribed medications? No     Is the patient taking medications as prescribed? yes     Who is going to help you get home at discharge? Spouse- Axel Aime [phone]     How do you get to doctors appointments? family or friend will provide     Are you on dialysis? No     Do you take coumadin? No     Discharge Plan A Home with family     Discharge Plan B Home with family;Home Health     DME Needed Upon Discharge  none     Discharge Plan discussed with: Patient     Discharge Barriers Identified None        Relationship/Environment    Name(s) of Who Lives With Patient Spouse- Axel Aime [phone]

## 2022-05-18 LAB
ALBUMIN SERPL BCP-MCNC: 2 G/DL (ref 3.5–5.2)
ALP SERPL-CCNC: 55 U/L (ref 55–135)
ALT SERPL W/O P-5'-P-CCNC: 13 U/L (ref 10–44)
ANION GAP SERPL CALC-SCNC: 7 MMOL/L (ref 8–16)
AORTIC ROOT ANNULUS: 3.08 CM
AORTIC VALVE CUSP SEPERATION: 2.34 CM
AST SERPL-CCNC: 15 U/L (ref 10–40)
AV INDEX (PROSTH): 0.8
AV MEAN GRADIENT: 5 MMHG
AV PEAK GRADIENT: 7 MMHG
AV VALVE AREA: 2.59 CM2
AV VELOCITY RATIO: 0.73
BASOPHILS # BLD AUTO: 0.07 K/UL (ref 0–0.2)
BASOPHILS NFR BLD: 1 % (ref 0–1.9)
BILIRUB SERPL-MCNC: 0.3 MG/DL (ref 0.1–1)
BSA FOR ECHO PROCEDURE: 1.82 M2
BUN SERPL-MCNC: 14 MG/DL (ref 6–20)
CALCIUM SERPL-MCNC: 8.4 MG/DL (ref 8.7–10.5)
CHLORIDE SERPL-SCNC: 109 MMOL/L (ref 95–110)
CO2 SERPL-SCNC: 24 MMOL/L (ref 23–29)
CREAT SERPL-MCNC: 0.9 MG/DL (ref 0.5–1.4)
CV ECHO LV RWT: 0.69 CM
DIFFERENTIAL METHOD: ABNORMAL
DOP CALC AO PEAK VEL: 1.35 M/S
DOP CALC AO VTI: 27.39 CM
DOP CALC LVOT AREA: 3.2 CM2
DOP CALC LVOT DIAMETER: 2.03 CM
DOP CALC LVOT PEAK VEL: 0.98 M/S
DOP CALC LVOT STROKE VOLUME: 71.04 CM3
DOP CALC MV VTI: 20.88 CM
DOP CALCLVOT PEAK VEL VTI: 21.96 CM
E WAVE DECELERATION TIME: 193.01 MSEC
E/A RATIO: 0.79
E/E' RATIO: 11.85 M/S
ECHO LV POSTERIOR WALL: 1.4 CM (ref 0.6–1.1)
EJECTION FRACTION: 65 %
EOSINOPHIL # BLD AUTO: 0.2 K/UL (ref 0–0.5)
EOSINOPHIL NFR BLD: 2.2 % (ref 0–8)
ERYTHROCYTE [DISTWIDTH] IN BLOOD BY AUTOMATED COUNT: 12.2 % (ref 11.5–14.5)
EST. GFR  (AFRICAN AMERICAN): >60 ML/MIN/1.73 M^2
EST. GFR  (NON AFRICAN AMERICAN): >60 ML/MIN/1.73 M^2
FRACTIONAL SHORTENING: 33 % (ref 28–44)
GLUCOSE SERPL-MCNC: 196 MG/DL (ref 70–110)
HCT VFR BLD AUTO: 29.9 % (ref 37–48.5)
HGB BLD-MCNC: 10.3 G/DL (ref 12–16)
IMM GRANULOCYTES # BLD AUTO: 0.01 K/UL (ref 0–0.04)
IMM GRANULOCYTES NFR BLD AUTO: 0.1 % (ref 0–0.5)
INTERVENTRICULAR SEPTUM: 1.44 CM (ref 0.6–1.1)
LA MAJOR: 5.1 CM
LA MINOR: 4.95 CM
LA WIDTH: 4.62 CM
LEFT ATRIUM SIZE: 3.92 CM
LEFT ATRIUM VOLUME INDEX: 43.2 ML/M2
LEFT ATRIUM VOLUME: 77.34 CM3
LEFT INTERNAL DIMENSION IN SYSTOLE: 2.72 CM (ref 2.1–4)
LEFT VENTRICLE DIASTOLIC VOLUME INDEX: 40.65 ML/M2
LEFT VENTRICLE DIASTOLIC VOLUME: 72.77 ML
LEFT VENTRICLE MASS INDEX: 122 G/M2
LEFT VENTRICLE SYSTOLIC VOLUME INDEX: 15.4 ML/M2
LEFT VENTRICLE SYSTOLIC VOLUME: 27.6 ML
LEFT VENTRICULAR INTERNAL DIMENSION IN DIASTOLE: 4.07 CM (ref 3.5–6)
LEFT VENTRICULAR MASS: 219.02 G
LV LATERAL E/E' RATIO: 11 M/S
LV SEPTAL E/E' RATIO: 12.83 M/S
LYMPHOCYTES # BLD AUTO: 2.8 K/UL (ref 1–4.8)
LYMPHOCYTES NFR BLD: 40.9 % (ref 18–48)
MAGNESIUM SERPL-MCNC: 1.9 MG/DL (ref 1.6–2.6)
MCH RBC QN AUTO: 30.7 PG (ref 27–31)
MCHC RBC AUTO-ENTMCNC: 34.4 G/DL (ref 32–36)
MCV RBC AUTO: 89 FL (ref 82–98)
MONOCYTES # BLD AUTO: 0.5 K/UL (ref 0.3–1)
MONOCYTES NFR BLD: 7.1 % (ref 4–15)
MV MEAN GRADIENT: 1 MMHG
MV PEAK A VEL: 0.97 M/S
MV PEAK E VEL: 0.77 M/S
MV PEAK GRADIENT: 4 MMHG
MV STENOSIS PRESSURE HALF TIME: 54.86 MS
MV VALVE AREA BY CONTINUITY EQUATION: 3.4 CM2
MV VALVE AREA P 1/2 METHOD: 4.01 CM2
NEUTROPHILS # BLD AUTO: 3.3 K/UL (ref 1.8–7.7)
NEUTROPHILS NFR BLD: 48.7 % (ref 38–73)
NRBC BLD-RTO: 0 /100 WBC
PHOSPHATE SERPL-MCNC: 3.5 MG/DL (ref 2.7–4.5)
PLATELET # BLD AUTO: 237 K/UL (ref 150–450)
PMV BLD AUTO: 11.1 FL (ref 9.2–12.9)
POCT GLUCOSE: 157 MG/DL (ref 70–110)
POCT GLUCOSE: 183 MG/DL (ref 70–110)
POCT GLUCOSE: 217 MG/DL (ref 70–110)
POCT GLUCOSE: 242 MG/DL (ref 70–110)
POTASSIUM SERPL-SCNC: 4.8 MMOL/L (ref 3.5–5.1)
PROT SERPL-MCNC: 4.8 G/DL (ref 6–8.4)
PV PEAK VELOCITY: 0.94 CM/S
RA MAJOR: 4.86 CM
RA PRESSURE: 3 MMHG
RA WIDTH: 3.15 CM
RBC # BLD AUTO: 3.36 M/UL (ref 4–5.4)
RIGHT VENTRICULAR END-DIASTOLIC DIMENSION: 3.1 CM
RV TISSUE DOPPLER FREE WALL SYSTOLIC VELOCITY 1 (APICAL 4 CHAMBER VIEW): 13.55 CM/S
SODIUM SERPL-SCNC: 140 MMOL/L (ref 136–145)
TDI LATERAL: 0.07 M/S
TDI SEPTAL: 0.06 M/S
TDI: 0.07 M/S
TRICUSPID ANNULAR PLANE SYSTOLIC EXCURSION: 2.56 CM
WBC # BLD AUTO: 6.74 K/UL (ref 3.9–12.7)

## 2022-05-18 PROCEDURE — 83735 ASSAY OF MAGNESIUM: CPT | Performed by: INTERNAL MEDICINE

## 2022-05-18 PROCEDURE — 25000003 PHARM REV CODE 250: Performed by: INTERNAL MEDICINE

## 2022-05-18 PROCEDURE — 36415 COLL VENOUS BLD VENIPUNCTURE: CPT | Performed by: INTERNAL MEDICINE

## 2022-05-18 PROCEDURE — 63600175 PHARM REV CODE 636 W HCPCS: Performed by: INTERNAL MEDICINE

## 2022-05-18 PROCEDURE — 94761 N-INVAS EAR/PLS OXIMETRY MLT: CPT

## 2022-05-18 PROCEDURE — 25000003 PHARM REV CODE 250: Performed by: SPECIALIST

## 2022-05-18 PROCEDURE — 25000003 PHARM REV CODE 250

## 2022-05-18 PROCEDURE — 80053 COMPREHEN METABOLIC PANEL: CPT | Performed by: INTERNAL MEDICINE

## 2022-05-18 PROCEDURE — 12000002 HC ACUTE/MED SURGE SEMI-PRIVATE ROOM

## 2022-05-18 PROCEDURE — 84100 ASSAY OF PHOSPHORUS: CPT | Performed by: INTERNAL MEDICINE

## 2022-05-18 PROCEDURE — 85025 COMPLETE CBC W/AUTO DIFF WBC: CPT | Performed by: INTERNAL MEDICINE

## 2022-05-18 RX ORDER — DIPHENHYDRAMINE HCL 25 MG
25 CAPSULE ORAL EVERY 6 HOURS PRN
Status: DISCONTINUED | OUTPATIENT
Start: 2022-05-18 | End: 2022-05-19 | Stop reason: HOSPADM

## 2022-05-18 RX ORDER — HYDRALAZINE HYDROCHLORIDE 25 MG/1
25 TABLET, FILM COATED ORAL EVERY 8 HOURS
Status: DISCONTINUED | OUTPATIENT
Start: 2022-05-18 | End: 2022-05-19 | Stop reason: HOSPADM

## 2022-05-18 RX ORDER — DIPHENHYDRAMINE HCL 25 MG
25 CAPSULE ORAL EVERY 6 HOURS PRN
Status: DISCONTINUED | OUTPATIENT
Start: 2022-05-18 | End: 2022-05-18

## 2022-05-18 RX ORDER — HYDRALAZINE HYDROCHLORIDE 20 MG/ML
10 INJECTION INTRAMUSCULAR; INTRAVENOUS EVERY 6 HOURS PRN
Status: DISCONTINUED | OUTPATIENT
Start: 2022-05-18 | End: 2022-05-19 | Stop reason: HOSPADM

## 2022-05-18 RX ORDER — LANOLIN ALCOHOL/MO/W.PET/CERES
400 CREAM (GRAM) TOPICAL ONCE
Status: COMPLETED | OUTPATIENT
Start: 2022-05-18 | End: 2022-05-18

## 2022-05-18 RX ORDER — FUROSEMIDE 40 MG/1
40 TABLET ORAL
Status: DISCONTINUED | OUTPATIENT
Start: 2022-05-18 | End: 2022-05-18

## 2022-05-18 RX ORDER — CARVEDILOL 6.25 MG/1
12.5 TABLET ORAL 2 TIMES DAILY
Status: DISCONTINUED | OUTPATIENT
Start: 2022-05-18 | End: 2022-05-19

## 2022-05-18 RX ORDER — FUROSEMIDE 40 MG/1
40 TABLET ORAL DAILY
Status: DISCONTINUED | OUTPATIENT
Start: 2022-05-19 | End: 2022-05-19 | Stop reason: HOSPADM

## 2022-05-18 RX ADMIN — ENOXAPARIN SODIUM 40 MG: 100 INJECTION SUBCUTANEOUS at 05:05

## 2022-05-18 RX ADMIN — HYDRALAZINE HYDROCHLORIDE 25 MG: 25 TABLET, FILM COATED ORAL at 08:05

## 2022-05-18 RX ADMIN — OXYCODONE 5 MG: 5 TABLET ORAL at 03:05

## 2022-05-18 RX ADMIN — FUROSEMIDE 40 MG: 40 TABLET ORAL at 09:05

## 2022-05-18 RX ADMIN — MUPIROCIN: 20 OINTMENT TOPICAL at 09:05

## 2022-05-18 RX ADMIN — INSULIN ASPART 2 UNITS: 100 INJECTION, SOLUTION INTRAVENOUS; SUBCUTANEOUS at 05:05

## 2022-05-18 RX ADMIN — ISOSORBIDE MONONITRATE 30 MG: 30 TABLET, EXTENDED RELEASE ORAL at 08:05

## 2022-05-18 RX ADMIN — DIPHENHYDRAMINE HYDROCHLORIDE 25 MG: 25 CAPSULE ORAL at 09:05

## 2022-05-18 RX ADMIN — OXYCODONE 5 MG: 5 TABLET ORAL at 01:05

## 2022-05-18 RX ADMIN — Medication 400 MG: at 09:05

## 2022-05-18 RX ADMIN — CARVEDILOL 12.5 MG: 6.25 TABLET, FILM COATED ORAL at 09:05

## 2022-05-18 RX ADMIN — INSULIN ASPART 2 UNITS: 100 INJECTION, SOLUTION INTRAVENOUS; SUBCUTANEOUS at 08:05

## 2022-05-18 RX ADMIN — ATORVASTATIN CALCIUM 80 MG: 40 TABLET, FILM COATED ORAL at 09:05

## 2022-05-18 RX ADMIN — VALSARTAN 160 MG: 80 TABLET, FILM COATED ORAL at 09:05

## 2022-05-18 RX ADMIN — VALSARTAN 160 MG: 80 TABLET, FILM COATED ORAL at 08:05

## 2022-05-18 RX ADMIN — ACETAMINOPHEN 650 MG: 325 TABLET ORAL at 09:05

## 2022-05-18 RX ADMIN — MUPIROCIN: 20 OINTMENT TOPICAL at 08:05

## 2022-05-18 RX ADMIN — Medication 81 MG: at 08:05

## 2022-05-18 NOTE — PLAN OF CARE
Plan of Care:     Aspiration precautions: patient encouraged to elevate HOB during oral intake    Bleeding precautions: patient receiving Lovenox; monitor for signs of bleeding and excessive bruising    Cardiac Monitoring: monitor and document vital signs per unit protocol; notify physician of significant changes in vital signs. Cardene infusion to remain off.     Fall precautions: Sight impaired; patient encouraged to call for assistance. Bed alarm activated    Safety precautions: Call light and personal belongings within reach, wheels locked and bed in lowest position    Moisture Management: incontinence pad changed as needed; skin to remain clean, dry, and intact    Hygiene: self-care

## 2022-05-18 NOTE — NURSING
Received report from [facility] ICU nurse. Vitals taken. Patient stable, will continue to monitor.

## 2022-05-18 NOTE — PLAN OF CARE
Patient resting comfortably in the bed. Spouse at the bedside. BS taken, SSI given, tolerated well. Patient informed of NPO status at midnight for stress test in the morning. Will continue to monitor and report to night nurse.   Problem: Adult Inpatient Plan of Care  Goal: Plan of Care Review  Outcome: Ongoing, Progressing     Problem: Hypertension Comorbidity  Goal: Blood Pressure in Desired Range  Outcome: Ongoing, Progressing

## 2022-05-18 NOTE — CARE UPDATE
05/17/22 2013   Patient Assessment/Suction   Level of Consciousness (AVPU) alert   Respiratory Effort Unlabored   Rhythm/Pattern, Respiratory unlabored;pattern regular   PRE-TX-O2   O2 Device (Oxygen Therapy) room air   SpO2 99 %   Pulse Oximetry Type Continuous   $ Pulse Oximetry - Multiple Charge Pulse Oximetry - Multiple   Pulse 83   Resp 16   BP (!) 166/75

## 2022-05-18 NOTE — PLAN OF CARE
Problem: Adult Inpatient Plan of Care  Goal: Plan of Care Review  Outcome: Ongoing, Progressing  Goal: Patient-Specific Goal (Individualized)  Outcome: Ongoing, Progressing  Goal: Absence of Hospital-Acquired Illness or Injury  Outcome: Ongoing, Progressing  Goal: Optimal Comfort and Wellbeing  Outcome: Ongoing, Progressing  Goal: Readiness for Transition of Care  Outcome: Ongoing, Progressing     Problem: Heart Failure Comorbidity  Goal: Maintenance of Heart Failure Symptom Control  Outcome: Ongoing, Progressing     Problem: Hypertension Comorbidity  Goal: Blood Pressure in Desired Range  Outcome: Ongoing, Progressing     Problem: Pain Chronic (Persistent) (Comorbidity Management)  Goal: Acceptable Pain Control and Functional Ability  Outcome: Ongoing, Progressing

## 2022-05-19 VITALS
BODY MASS INDEX: 27.66 KG/M2 | TEMPERATURE: 99 F | HEIGHT: 64 IN | HEART RATE: 88 BPM | OXYGEN SATURATION: 99 % | SYSTOLIC BLOOD PRESSURE: 147 MMHG | DIASTOLIC BLOOD PRESSURE: 69 MMHG | WEIGHT: 162 LBS | RESPIRATION RATE: 18 BRPM

## 2022-05-19 PROBLEM — I50.31 ACUTE DIASTOLIC CHF (CONGESTIVE HEART FAILURE): Status: ACTIVE | Noted: 2022-05-19

## 2022-05-19 LAB
ANION GAP SERPL CALC-SCNC: 8 MMOL/L (ref 8–16)
BASOPHILS # BLD AUTO: 0.08 K/UL (ref 0–0.2)
BASOPHILS NFR BLD: 1.4 % (ref 0–1.9)
BUN SERPL-MCNC: 14 MG/DL (ref 6–20)
CALCIUM SERPL-MCNC: 8.5 MG/DL (ref 8.7–10.5)
CHLORIDE SERPL-SCNC: 107 MMOL/L (ref 95–110)
CO2 SERPL-SCNC: 23 MMOL/L (ref 23–29)
CREAT SERPL-MCNC: 0.9 MG/DL (ref 0.5–1.4)
CV PHARM DOSE: 0.4 MG
CV STRESS BASE HR: 83 BPM
DIASTOLIC BLOOD PRESSURE: 70 MMHG
DIFFERENTIAL METHOD: ABNORMAL
EOSINOPHIL # BLD AUTO: 0.1 K/UL (ref 0–0.5)
EOSINOPHIL NFR BLD: 2.2 % (ref 0–8)
ERYTHROCYTE [DISTWIDTH] IN BLOOD BY AUTOMATED COUNT: 12.5 % (ref 11.5–14.5)
EST. GFR  (AFRICAN AMERICAN): >60 ML/MIN/1.73 M^2
EST. GFR  (NON AFRICAN AMERICAN): >60 ML/MIN/1.73 M^2
GLUCOSE SERPL-MCNC: 151 MG/DL (ref 70–110)
HCT VFR BLD AUTO: 31.7 % (ref 37–48.5)
HGB BLD-MCNC: 10.7 G/DL (ref 12–16)
IMM GRANULOCYTES # BLD AUTO: 0.01 K/UL (ref 0–0.04)
IMM GRANULOCYTES NFR BLD AUTO: 0.2 % (ref 0–0.5)
LYMPHOCYTES # BLD AUTO: 2 K/UL (ref 1–4.8)
LYMPHOCYTES NFR BLD: 36.7 % (ref 18–48)
MAGNESIUM SERPL-MCNC: 1.8 MG/DL (ref 1.6–2.6)
MCH RBC QN AUTO: 30.2 PG (ref 27–31)
MCHC RBC AUTO-ENTMCNC: 33.8 G/DL (ref 32–36)
MCV RBC AUTO: 90 FL (ref 82–98)
MONOCYTES # BLD AUTO: 0.4 K/UL (ref 0.3–1)
MONOCYTES NFR BLD: 7.6 % (ref 4–15)
NEUTROPHILS # BLD AUTO: 2.9 K/UL (ref 1.8–7.7)
NEUTROPHILS NFR BLD: 51.9 % (ref 38–73)
NRBC BLD-RTO: 0 /100 WBC
OHS CV CPX 85 PERCENT MAX PREDICTED HEART RATE MALE: 141
OHS CV CPX MAX PREDICTED HEART RATE: 166
OHS CV CPX PATIENT IS FEMALE: 1
OHS CV CPX PATIENT IS MALE: 0
OHS CV CPX PEAK DIASTOLIC BLOOD PRESSURE: 96 MMHG
OHS CV CPX PEAK HEAR RATE: 113 BPM
OHS CV CPX PEAK RATE PRESSURE PRODUCT: NORMAL
OHS CV CPX PEAK SYSTOLIC BLOOD PRESSURE: 187 MMHG
OHS CV CPX PERCENT MAX PREDICTED HEART RATE ACHIEVED: 68
OHS CV CPX RATE PRESSURE PRODUCT PRESENTING: 9462
PLATELET # BLD AUTO: 242 K/UL (ref 150–450)
PMV BLD AUTO: 12 FL (ref 9.2–12.9)
POCT GLUCOSE: 126 MG/DL (ref 70–110)
POCT GLUCOSE: 143 MG/DL (ref 70–110)
POCT GLUCOSE: 149 MG/DL (ref 70–110)
POTASSIUM SERPL-SCNC: 5 MMOL/L (ref 3.5–5.1)
RBC # BLD AUTO: 3.54 M/UL (ref 4–5.4)
SODIUM SERPL-SCNC: 138 MMOL/L (ref 136–145)
SYSTOLIC BLOOD PRESSURE: 114 MMHG
WBC # BLD AUTO: 5.56 K/UL (ref 3.9–12.7)

## 2022-05-19 PROCEDURE — 85025 COMPLETE CBC W/AUTO DIFF WBC: CPT | Performed by: INTERNAL MEDICINE

## 2022-05-19 PROCEDURE — 25000003 PHARM REV CODE 250: Performed by: SPECIALIST

## 2022-05-19 PROCEDURE — 25000003 PHARM REV CODE 250: Performed by: INTERNAL MEDICINE

## 2022-05-19 PROCEDURE — 83735 ASSAY OF MAGNESIUM: CPT | Performed by: INTERNAL MEDICINE

## 2022-05-19 PROCEDURE — 63600175 PHARM REV CODE 636 W HCPCS: Performed by: INTERNAL MEDICINE

## 2022-05-19 PROCEDURE — 36415 COLL VENOUS BLD VENIPUNCTURE: CPT | Performed by: INTERNAL MEDICINE

## 2022-05-19 PROCEDURE — 80048 BASIC METABOLIC PNL TOTAL CA: CPT | Performed by: INTERNAL MEDICINE

## 2022-05-19 PROCEDURE — 99223 PR INITIAL HOSPITAL CARE,LEVL III: ICD-10-PCS | Mod: ,,, | Performed by: SPECIALIST

## 2022-05-19 PROCEDURE — 99223 1ST HOSP IP/OBS HIGH 75: CPT | Mod: ,,, | Performed by: SPECIALIST

## 2022-05-19 PROCEDURE — 94761 N-INVAS EAR/PLS OXIMETRY MLT: CPT

## 2022-05-19 RX ORDER — CARVEDILOL 25 MG/1
25 TABLET ORAL 2 TIMES DAILY
Status: DISCONTINUED | OUTPATIENT
Start: 2022-05-19 | End: 2022-05-19

## 2022-05-19 RX ORDER — HYDRALAZINE HYDROCHLORIDE 25 MG/1
25 TABLET, FILM COATED ORAL 3 TIMES DAILY
Qty: 90 TABLET | Refills: 2 | Status: SHIPPED | OUTPATIENT
Start: 2022-05-19 | End: 2022-08-17

## 2022-05-19 RX ORDER — REGADENOSON 0.08 MG/ML
0.4 INJECTION, SOLUTION INTRAVENOUS ONCE
Status: COMPLETED | OUTPATIENT
Start: 2022-05-19 | End: 2022-05-19

## 2022-05-19 RX ORDER — CARVEDILOL 25 MG/1
25 TABLET ORAL 2 TIMES DAILY
Qty: 60 TABLET | Refills: 2 | Status: CANCELLED | OUTPATIENT
Start: 2022-05-20 | End: 2022-08-18

## 2022-05-19 RX ORDER — LANOLIN ALCOHOL/MO/W.PET/CERES
400 CREAM (GRAM) TOPICAL 2 TIMES DAILY
Status: DISCONTINUED | OUTPATIENT
Start: 2022-05-19 | End: 2022-05-19 | Stop reason: HOSPADM

## 2022-05-19 RX ORDER — CARVEDILOL 6.25 MG/1
12.5 TABLET ORAL 2 TIMES DAILY
Status: DISCONTINUED | OUTPATIENT
Start: 2022-05-19 | End: 2022-05-19

## 2022-05-19 RX ORDER — LANOLIN ALCOHOL/MO/W.PET/CERES
400 CREAM (GRAM) TOPICAL DAILY
Qty: 5 TABLET | Refills: 0 | Status: SHIPPED | OUTPATIENT
Start: 2022-05-19 | End: 2022-05-24

## 2022-05-19 RX ORDER — VALSARTAN 160 MG/1
160 TABLET ORAL 2 TIMES DAILY
Qty: 60 TABLET | Refills: 2 | Status: SHIPPED | OUTPATIENT
Start: 2022-05-19 | End: 2023-06-12 | Stop reason: SDUPTHER

## 2022-05-19 RX ORDER — CARVEDILOL 25 MG/1
25 TABLET ORAL 2 TIMES DAILY
Status: DISCONTINUED | OUTPATIENT
Start: 2022-05-20 | End: 2022-05-19 | Stop reason: HOSPADM

## 2022-05-19 RX ORDER — CARVEDILOL 6.25 MG/1
12.5 TABLET ORAL ONCE
Status: COMPLETED | OUTPATIENT
Start: 2022-05-19 | End: 2022-05-19

## 2022-05-19 RX ORDER — METFORMIN HYDROCHLORIDE 500 MG/1
1000 TABLET ORAL 2 TIMES DAILY WITH MEALS
Qty: 120 TABLET | Refills: 2 | Status: SHIPPED | OUTPATIENT
Start: 2022-05-19 | End: 2023-11-02

## 2022-05-19 RX ORDER — ATORVASTATIN CALCIUM 20 MG/1
20 TABLET, FILM COATED ORAL DAILY
Qty: 30 TABLET | Refills: 2 | Status: SHIPPED | OUTPATIENT
Start: 2022-05-19 | End: 2023-05-29

## 2022-05-19 RX ORDER — FUROSEMIDE 40 MG/1
40 TABLET ORAL DAILY
Qty: 30 TABLET | Refills: 2 | Status: SHIPPED | OUTPATIENT
Start: 2022-05-20 | End: 2022-12-15 | Stop reason: SDUPTHER

## 2022-05-19 RX ADMIN — OXYCODONE 5 MG: 5 TABLET ORAL at 10:05

## 2022-05-19 RX ADMIN — VALSARTAN 160 MG: 80 TABLET, FILM COATED ORAL at 10:05

## 2022-05-19 RX ADMIN — CARVEDILOL 12.5 MG: 6.25 TABLET, FILM COATED ORAL at 10:05

## 2022-05-19 RX ADMIN — ISOSORBIDE MONONITRATE 30 MG: 30 TABLET, EXTENDED RELEASE ORAL at 10:05

## 2022-05-19 RX ADMIN — HYDRALAZINE HYDROCHLORIDE 25 MG: 25 TABLET, FILM COATED ORAL at 02:05

## 2022-05-19 RX ADMIN — Medication 400 MG: at 10:05

## 2022-05-19 RX ADMIN — Medication 81 MG: at 10:05

## 2022-05-19 RX ADMIN — FUROSEMIDE 40 MG: 40 TABLET ORAL at 10:05

## 2022-05-19 RX ADMIN — CARVEDILOL 12.5 MG: 6.25 TABLET, FILM COATED ORAL at 05:05

## 2022-05-19 RX ADMIN — REGADENOSON 0.4 MG: 0.08 INJECTION, SOLUTION INTRAVENOUS at 09:05

## 2022-05-19 RX ADMIN — HYDRALAZINE HYDROCHLORIDE 10 MG: 20 INJECTION, SOLUTION INTRAMUSCULAR; INTRAVENOUS at 04:05

## 2022-05-19 RX ADMIN — ENOXAPARIN SODIUM 40 MG: 100 INJECTION SUBCUTANEOUS at 05:05

## 2022-05-19 RX ADMIN — MUPIROCIN: 20 OINTMENT TOPICAL at 10:05

## 2022-05-19 NOTE — DISCHARGE SUMMARY
[facility] Hospital Medicine  Discharge Summary      Patient Name: Mayra Davis  MRN: [id number]  Admission Date: 5/16/2022  Hospital Length of Stay: 2 days  Discharge Date and Time:  05/19/2022 5:03 PM  Attending Physician: Nick Ocampo MD   Discharging Provider: Nick Ocampo MD  Primary Care Provider: Primary Doctor No        HPI:       46-year-old female with history of CHF, diabetes presents with 1 week of worsening lower extremity edema ultimately developing chest pain on the day of admission found to be in decompensated heart failure with severely elevated blood pressure.     For 1 week the patient has been experiencing worsening lower extremity edema which she feels is worse on the right.  She has been compliant with her home medication regimen and her Lasix dose have not recently been changed.  On the day of admission she developed moderately severe substernal chest pain leading to her presentation.  No palpitations or lightheadedness.  No shortness of breath, fever or cough.    * No surgery found *      Hospital Course:       46-year-old female with history of CHF, diabetes presents with 1 week of worsening lower extremity edema ultimately developing chest pain on the day of admission found to be in decompensated heart failure with severely elevated blood pressure.    Through admission she was diuresed and ultimately became euvolemic.  At this point she was transition from Lasix 40 IV twice a day to Lasix 40 by mouth daily which maintain euvolemia.    She presented with severely elevated blood pressure requiring a nicardipine drip.  This was transitioned off and her blood pressure was controlled on oral medications.  See med rec below.    Regarding the chest pain/shortness of breath prior to admission, she underwent a stress test which the cardiologist read as containing a false-positive and that there was not reversible ischemia.  Further workup was not recommended.    At  the time of discharge her headache, shortness of breath and chest pain had resolved and she was able to ambulate comfortably without lightheadedness.  ?     1. Acute on chronic diastolic congestive heart failure  -Lasix 40 daily as now euvolemic after IV lasix        2. Chest pain, exertional dyspnea   -secondary to severe hypertension   -troponins negative  -ASA, statin, prn morphine  -cardiology consult      3. HTN:   -nicardipine drip now off   -valsartan 180 bid    -hydral 25 tid  -coreg 12.5 bid to be increased to 25 bid on 5/20. (imdur given on 5/19 so allowing it to wash out before increasing carvedilol)  -Previously intolerant to amlodipine      4. DMII  -Metformin  - A1C 8.2    Patient does not want metformin. She agrees to not pick it up from the pharmacy     Consults:   Consults (From admission, onward)        Status Ordering Provider     Inpatient consult to Cardiology  Once        Provider:  Ha King MD    Acknowledged CARMEN VELÁZQUEZ             Discharged Condition: stable    Disposition: Home or Self Care    Follow Up:    Patient Instructions:   No discharge procedures on file.  Medications:  Reconciled Home Medications:      Medication List      START taking these medications    atorvastatin 20 MG tablet  Commonly known as: LIPITOR  Take 1 tablet (20 mg total) by mouth once daily.     hydrALAZINE 25 MG tablet  Commonly known as: APRESOLINE  Take 1 tablet (25 mg total) by mouth 3 (three) times daily.     magnesium oxide 400 mg (241.3 mg magnesium) tablet  Commonly known as: MAG-OX  Take 1 tablet (400 mg total) by mouth once daily. for 5 days     metFORMIN 500 MG tablet  Commonly known as: GLUCOPHAGE  Take 2 tablets (1,000 mg total) by mouth 2 (two) times daily with meals.     valsartan 160 MG tablet  Commonly known as: DIOVAN  Take 1 tablet (160 mg total) by mouth 2 (two) times daily.        CHANGE how you take these medications    furosemide 40 MG tablet  Commonly known as: LASIX  Take 1  tablet (40 mg total) by mouth once daily.  Start taking on: May 20, 2022  What changed:   · medication strength  · how much to take  · when to take this        CONTINUE taking these medications    ALPRAZolam 1 MG tablet  Commonly known as: XANAX  Take 1 mg by mouth 3 (three) times daily as needed.     busPIRone 7.5 MG tablet  Commonly known as: BUSPAR  Take 7.5 mg by mouth 3 (three) times daily.     COMBIGAN 0.2-0.5 % Drop  Generic drug: brimonidine-timoloL  Place into both eyes.     glipiZIDE 10 MG tablet  Commonly known as: GLUCOTROL  Take 10 mg by mouth 2 (two) times daily.     lamoTRIgine 50 mg Tr24  Take 1 tablet by mouth once daily.     omeprazole 20 MG capsule  Commonly known as: PRILOSEC  Take 20 mg by mouth once daily.     OXcarbazepine 150 MG Tab  Commonly known as: TRILEPTAL  Take 150 mg by mouth 2 (two) times daily.     pantoprazole 40 MG tablet  Commonly known as: PROTONIX  Take 40 mg by mouth once daily.     prednisoLONE acetate 1 % Drps  Commonly known as: PRED FORTE  Place 1 drop into both eyes 4 (four) times daily.     QUEtiapine 25 MG Tab  Commonly known as: SEROQUEL  Take 25 mg by mouth nightly.     TRESIBA FLEXTOUCH U-100 100 unit/mL (3 mL) insulin pen  Generic drug: insulin degludec  Inject into the skin.     VITAMIN D3 1000 units Tab  Generic drug: vitamin D  Take 1,000 Units by mouth once daily.        STOP taking these medications    amLODIPine 5 MG tablet  Commonly known as: NORVASC     cloNIDine 0.1 MG tablet  Commonly known as: CATAPRES     JANUMET XR  mg Tm24  Generic drug: SITagliptan-metformin     losartan 50 MG tablet  Commonly known as: COZAAR     potassium chloride 10 MEQ Cpsr  Commonly known as: MICRO-K     pravastatin 20 MG tablet  Commonly known as: PRAVACHOL                Pending Diagnostic Studies:     None        Indwelling Lines/Drains at time of discharge:   Lines/Drains/Airways     None                 Time spent on the discharge of patient: 35 minutes         Nick BARRAZA  MD [place]  Department of Hospital Medicine  [facility]

## 2022-05-19 NOTE — PROGRESS NOTES
"Daily Progress Note  [facility]      Mayra Davis (MRN: [id number])  Admitting Service: Hospital Medicine  Date of Admission: 5/16/2022   Primary Care Provider: No primary care provider on file.    ?  Chief complaint: chest pain   ?  History of Present Illness:     46-year-old female with history of CHF, diabetes presents with 1 week of worsening lower extremity edema ultimately developing chest pain on the day of admission found to be in decompensated heart failure with severely elevated blood pressure.    For 1 week the patient has been experiencing worsening lower extremity edema which she feels is worse on the right.  She has been compliant with her home medication regimen and her Lasix dose have not recently been changed.  On the day of admission she developed moderately severe substernal chest pain leading to her presentation.  No palpitations or lightheadedness.  No shortness of breath, fever or cough.  ?  ?Subjective:   Chest discomfort improving. Still short of breath with minimal exertion. Still with headache   ?  PHYSICAL EXAM  Vitals: BP (!) 196/89 (BP Location: Left arm, Patient Position: Lying)   Pulse 82   Temp 98.6 °F (37 °C) (Oral)   Resp 16   Ht 5' 4" (1.626 m)   Wt 73.5 kg (162 lb)   SpO2 97%   Breastfeeding No Comment: hysterectomy  BMI 27.81 kg/m²  Body mass index is 27.81 kg/m².    General: NAD, AO3  HEENT: PERRL, EOMI.   Cardiovascular: RRR  Respiratory:ctab  GI: abdomen S/NT/ND, +BS  Extremities: resolved edema   MSK: moves upper extremities.   Neuro/Psych: A&OX3. CNII-XII grossly intact.      EKG and radiographs from the past 24h: reviewed and personally interpreted if available.      LABS    Recent Labs     05/18/22 0327   WBC 6.74   HGB 10.3*        ?  Recent Labs     05/18/22 0327      K 4.8   CO2 24   BUN 14   CREATININE 0.9   MG 1.9   PHOS 3.5     ?  Recent Labs     05/18/22 0327   BILITOT 0.3   AST 15   ALT 13   ALKPHOS 55   PROT 4.8*     ?  No " results for input(s): INR, PT, PTT in the last 72 hours.  ?    ASSESSMENT & PLAN    46-year-old female with history of CHF, diabetes presents with 1 week of worsening lower extremity edema ultimately developing chest pain on the day of admission found to be in decompensated heart failure with severely elevated blood pressure  ?    1. Acute on chronic diastolic congestive heart failure  -Lasix 40 daily as now euvolemic after IV lasix      2. Chest pain, exertional dyspnea   -secondary to severe hypertension v. CAD  -troponins negative  -ASA, statin, prn morphine  -cardiology consult     3. HTN:   -nicardipine drip now off   -valsartan 180 bid (home losartan)   -imdur 30 started, hydral 25 tid  -coreg 12.5 bid  -Previously intolerant to amlodipine     4. DMII  -ISS  -AM A1C    DVT ppx: lovenox       Code Status/Dispo: Full Code.   ?  Nick Ocampo    Date: 5/18/2022

## 2022-05-19 NOTE — PLAN OF CARE
Problem: Adult Inpatient Plan of Care  Goal: Plan of Care Review  Outcome: Ongoing, Progressing     Problem: Adult Inpatient Plan of Care  Goal: Optimal Comfort and Wellbeing  Outcome: Ongoing, Progressing     Problem: Hypertension Comorbidity  Goal: Blood Pressure in Desired Range  Outcome: Ongoing, Progressing     PRN tylenol given for headache, and PRN benadryl given for itching, PRN Hydralazine given for , NPO at midnight for Stress test, resting between care.

## 2022-05-19 NOTE — PLAN OF CARE
05/19/22 0726   Patient Assessment/Suction   Level of Consciousness (AVPU) alert   Respiratory Effort Normal;Unlabored   Expansion/Accessory Muscles/Retractions no retractions;no use of accessory muscles   Rhythm/Pattern, Respiratory depth regular;pattern regular;unlabored   Cough Frequency no cough   PRE-TX-O2   O2 Device (Oxygen Therapy) room air   SpO2 97 %   Pulse Oximetry Type Intermittent   $ Pulse Oximetry - Multiple Charge Pulse Oximetry - Multiple   Pulse 86   Resp 18

## 2022-05-19 NOTE — CONSULTS
[facility]  Cardiology  Consult Note    Patient Name: Mayra Davis  MRN: [id number]  Admission Date: 5/16/2022  Hospital Length of Stay: 2 days  Code Status: Full Code   Attending Provider: Nick Ocampo MD   Consulting Provider: Ha King MD  Primary Care Physician: No primary care provider on file.  Principal Problem:<principal problem not specified>    Patient information was obtained from patient and ER records.     Consults  Subjective:     Chief Complaint:  Chest pain     HPI:  Patient had some chest and arm pain and tingling and shortness of breath and was admitted the hospital.  There was some evidence of mild congestive failure and she was started on diuretics  She has a history of high blood pressure specially during the past year and her home medicines include amlodipine clonidine Lasix.   She has not been on a totally low-sodium diet  In 2009 she was told she had heart attack she had angiogram and L issue and no stents were placed  Up in of this past year blood pressures been under control but at home now she runs pressures of 180 and she can tell when pressure is high because she is slightly short of breath and has swelling in the legs     No past medical history on file.    No past surgical history on file.    Review of patient's allergies indicates:   Allergen Reactions    Vancomycin analogues        No current facility-administered medications on file prior to encounter.     Current Outpatient Medications on File Prior to Encounter   Medication Sig    ALPRAZolam (XANAX) 1 MG tablet Take 1 mg by mouth 3 (three) times daily as needed.    amLODIPine (NORVASC) 5 MG tablet Take 5 mg by mouth once daily.    busPIRone (BUSPAR) 7.5 MG tablet Take 7.5 mg by mouth 3 (three) times daily.    cloNIDine (CATAPRES) 0.1 MG tablet Take 0.1 mg by mouth 2 (two) times daily.    COMBIGAN 0.2-0.5 % Drop Place into both eyes.    glipiZIDE (GLUCOTROL) 10 MG tablet Take 10 mg by mouth  2 (two) times daily.    JANUMET XR  mg TM24 Take 1 tablet by mouth once daily.    lamoTRIgine 50 mg TR24 Take 1 tablet by mouth once daily.    LASIX 20 mg tablet Take 20 mg by mouth every morning.    losartan (COZAAR) 50 MG tablet Take 100 mg by mouth once daily.    omeprazole (PRILOSEC) 20 MG capsule Take 20 mg by mouth once daily.    OXcarbazepine (TRILEPTAL) 150 MG Tab Take 150 mg by mouth 2 (two) times daily.    pantoprazole (PROTONIX) 40 MG tablet Take 40 mg by mouth once daily.    potassium chloride (MICRO-K) 10 MEQ CpSR Take 10 mEq by mouth once daily.    pravastatin (PRAVACHOL) 20 MG tablet Take 20 mg by mouth once daily.    prednisoLONE acetate (PRED FORTE) 1 % DrpS Place 1 drop into both eyes 4 (four) times daily.    QUEtiapine (SEROQUEL) 25 MG Tab Take 25 mg by mouth nightly.    TRESIBA FLEXTOUCH U-100 100 unit/mL (3 mL) insulin pen Inject into the skin.    VITAMIN D3 25 mcg (1,000 unit) tablet Take 1,000 Units by mouth once daily.     Family History    None       Tobacco Use    Smoking status: Not on file    Smokeless tobacco: Not on file   Substance and Sexual Activity    Alcohol use: Not on file    Drug use: Not on file    Sexual activity: Not on file     ROS  Objective:     Vital Signs (Most Recent):  Temp: 99.3 °F (37.4 °C) (05/19/22 0740)  Pulse: 85 (05/19/22 0929)  Resp: 16 (05/19/22 0740)  BP: 114/70 (05/19/22 0929)  SpO2: 98 % (05/19/22 0740) Vital Signs (24h Range):  Temp:  [97 °F (36.1 °C)-99.3 °F (37.4 °C)] 99.3 °F (37.4 °C)  Pulse:  [82-91] 85  Resp:  [16-18] 16  SpO2:  [96 %-98 %] 98 %  BP: (114-196)/(60-89) 114/70     Weight: 73.5 kg (162 lb)  Body mass index is 27.81 kg/m².    SpO2: 98 %  O2 Device (Oxygen Therapy): room air    No intake or output data in the 24 hours ending 05/19/22 0947    Lines/Drains/Airways     Peripheral Intravenous Line  Duration                Peripheral IV - Single Lumen 05/16/22 22 G Left Forearm 3 days         Peripheral IV - Single Lumen  05/17/22 0400 20 G Anterior;Right Forearm 2 days                Physical Exam blood pressure is 130/84 pre stress test  No carotid bruits  Lungs are clear  Cardiac regular  Abdomen no masses  Extremities good femoral pulses but decreased elasticity  Neurologic intact    Significant Labs:   CMP   Recent Labs   Lab 05/18/22  0327 05/19/22  0551    138   K 4.8 5.0    107   CO2 24 23   * 151*   BUN 14 14   CREATININE 0.9 0.9   CALCIUM 8.4* 8.5*   PROT 4.8*  --    ALBUMIN 2.0*  --    BILITOT 0.3  --    ALKPHOS 55  --    AST 15  --    ALT 13  --    ANIONGAP 7* 8   ESTGFRAFRICA >60 >60   EGFRNONAA >60 >60   , CBC   Recent Labs   Lab 05/18/22  0327 05/19/22  0551   WBC 6.74 5.56   HGB 10.3* 10.7*   HCT 29.9* 31.7*    242    and Troponin   Recent Labs   Lab 05/17/22  1125 05/17/22  1624   TROPONINI 0.023 0.019       Significant Imaging:  EKG demonstrates LVH and echo demonstrates normal left ventricle function with left ventricle hypertrophy  Assessment and Plan:   Patient's symptoms are related to her blood pressure which is not been under control  Will do stress test but if negative she can go home on increased blood pressure medication  I recommend Lasix 20 a day; Apresoline 25 t.i.d.; I would probably use Toprol XL 50 day rather than carvedilol; continue losartan 100 a day  Clonidine 0.1 p.r.n. blood pressure greater than 150  Instruct on low-sodium diet    Addendum  Nuclear stress test shows small apical defect on vertical long-axis view-this probably represents attenuation apical thinning rather than true reversible ischemia  I would recommend discharging on medications as listed above in followed up as outpatient  See no indication for cardiac catheterization at this time  VTE Risk Mitigation (From admission, onward)         Ordered     enoxaparin injection 40 mg  Daily         05/16/22 1809     IP VTE HIGH RISK PATIENT  Once         05/16/22 1809     Place sequential compression device   Until discontinued         05/16/22 1809            Check urine and schedule outpatient renal artery ultrasound  I personally reviewed old and new ecg's, lab reports,, xray reports  and  other cardiovascular studies including  echo's, stress tests, angiogram reports, holters,and vascular studies .  In addition I evaluated original cardiac cath  ___echo  ____cxr ______ct ____scan on [employer] or [employer] or other viewing platforms .  I reviewed  office and hospital notes Yes _x___ of  referring providers.    Thank you for your consult.     Ha King MD  Cardiology   [facility]

## 2022-05-20 ENCOUNTER — TELEPHONE (OUTPATIENT)
Dept: FAMILY MEDICINE | Facility: CLINIC | Age: 47
End: 2022-05-20
Payer: MEDICARE

## 2022-05-20 NOTE — PLAN OF CARE
05/20/22 0730   Final Note   Assessment Type Final Discharge Note   Anticipated Discharge Disposition Home

## 2022-08-14 ENCOUNTER — HOSPITAL ENCOUNTER (EMERGENCY)
Facility: HOSPITAL | Age: 47
Discharge: HOME | End: 2022-08-14
Attending: EMERGENCY MEDICINE
Payer: MEDICARE

## 2022-08-14 VITALS
TEMPERATURE: 98 F | SYSTOLIC BLOOD PRESSURE: 139 MMHG | OXYGEN SATURATION: 99 % | BODY MASS INDEX: 27.66 KG/M2 | DIASTOLIC BLOOD PRESSURE: 63 MMHG | HEIGHT: 64 IN | RESPIRATION RATE: 20 BRPM | WEIGHT: 162 LBS | HEART RATE: 76 BPM

## 2022-08-14 DIAGNOSIS — J06.9 VIRAL URI WITH COUGH: Primary | ICD-10-CM

## 2022-08-14 LAB
INFLUENZA A, MOLECULAR: NEGATIVE
INFLUENZA B, MOLECULAR: NEGATIVE
SARS-COV-2 RDRP RESP QL NAA+PROBE: NEGATIVE
SPECIMEN SOURCE: NORMAL

## 2022-08-14 PROCEDURE — 87502 INFLUENZA DNA AMP PROBE: CPT | Performed by: EMERGENCY MEDICINE

## 2022-08-14 PROCEDURE — 99283 EMERGENCY DEPT VISIT LOW MDM: CPT | Mod: 25

## 2022-08-14 PROCEDURE — U0002 COVID-19 LAB TEST NON-CDC: HCPCS | Performed by: EMERGENCY MEDICINE

## 2022-08-14 RX ORDER — BENZONATATE 100 MG/1
100 CAPSULE ORAL 3 TIMES DAILY PRN
Qty: 30 CAPSULE | Refills: 0 | Status: SHIPPED | OUTPATIENT
Start: 2022-08-14 | End: 2022-08-24

## 2022-08-14 NOTE — ED PROVIDER NOTES
Encounter Date: 8/14/2022       History     Chief Complaint   Patient presents with    Cough     Cold symptoms x 3 weeks      46-year-old female diabetic presents with 3 weeks of cold symptoms. When her symptoms started she had a high fever that has now resolved.  She reports persistent cough.  Initially had some shortness of breath but this too is improving.  She continues to complain of congestion and sneezing.   has been sick the same amount of time with similar symptoms.    The history is provided by the patient.     Review of patient's allergies indicates:   Allergen Reactions    Vancomycin analogues      No past medical history on file.  No past surgical history on file.  No family history on file.     Review of Systems   Constitutional: Positive for fatigue. Negative for activity change, appetite change, chills and fever.   HENT: Positive for congestion, rhinorrhea, sinus pressure and sneezing.    Respiratory: Positive for cough. Negative for shortness of breath.    Musculoskeletal: Negative.        Physical Exam     Initial Vitals [08/14/22 1227]   BP Pulse Resp Temp SpO2   (!) 115/53 80 20 99.1 °F (37.3 °C) 97 %      MAP       --         Physical Exam    Nursing note and vitals reviewed.  Constitutional: She appears well-developed and well-nourished. She is not diaphoretic. No distress.   HENT:   Head: Normocephalic and atraumatic.   Eyes: EOM are normal.   Neck: Neck supple.   Normal range of motion.  Cardiovascular: Normal rate, regular rhythm and normal heart sounds. Exam reveals no gallop and no friction rub.    No murmur heard.  Pulmonary/Chest: Breath sounds normal. No respiratory distress. She has no wheezes. She has no rhonchi. She has no rales.   Musculoskeletal:         General: Normal range of motion.      Cervical back: Normal range of motion and neck supple.     Neurological: She is alert and oriented to person, place, and time.   Skin: Skin is warm and dry.   Psychiatric: She has a  normal mood and affect. Her behavior is normal. Judgment and thought content normal.         ED Course   Procedures  Labs Reviewed   INFLUENZA A & B BY MOLECULAR   SARS-COV-2 RNA AMPLIFICATION, QUAL          Imaging Results          X-Ray Chest PA And Lateral (Final result)  Result time 08/14/22 12:58:58    Final result by Radha Beasley MD (08/14/22 12:58:58)                 Impression:      No acute abnormality.      Electronically signed by: Radha Beasley MD  Date:    08/14/2022  Time:    12:58             Narrative:    EXAMINATION:  XR CHEST PA AND LATERAL    CLINICAL HISTORY:  coughing;    TECHNIQUE:  PA and lateral views of the chest were performed.    COMPARISON:  05/16/2022    FINDINGS:  The lungs are clear, with normal appearance of pulmonary vasculature and no pleural effusion or pneumothorax.    The cardiac silhouette is normal in size. The hilar and mediastinal contours are unremarkable.    Bones are intact.                                 Medications - No data to display  Medical Decision Making:   History:   Old Medical Records: I decided to obtain old medical records.  Clinical Tests:   Lab Tests: Ordered and Reviewed  Radiological Study: Ordered and Reviewed             ED Course as of 08/14/22 1433   Sun Aug 14, 2022   1235 BP(!): 115/53 [EF]   1235 Temp: 99.1 °F (37.3 °C) [EF]   1235 Temp src: Oral [EF]   1235 Pulse: 80 [EF]   1235 Resp: 20 [EF]   1235 SpO2: 97 % [EF]   1308 X-Ray Chest PA And Lateral [EF]   1407 SARS-CoV-2 RNA, Amplification, Qual: Negative [EF]   1407 Influenza A, Molecular: Negative [EF]   1407 Influenza B, Molecular: Negative [EF]      ED Course User Index  [EF] Clark Rosenberg MD             Clinical Impression:   Final diagnoses:  [J06.9] Viral URI with cough (Primary)          ED Disposition Condition    Discharge Stable        ED Prescriptions     Medication Sig Dispense Start Date End Date Auth. Provider    benzonatate (TESSALON) 100 MG capsule Take 1 capsule (100  mg total) by mouth 3 (three) times daily as needed for Cough. 30 capsule 8/14/2022 8/24/2022 Clark Rosenberg MD        Follow-up Information     Follow up With Specialties Details Why Contact [facility]  Schedule an appointment as soon as possible for a visit   [address] [place] [address]  [place] [address]  [phone]            46-year-old female presents to the ER with 3 weeks of viral URI symptoms.  Flu COVID chest x-ray negative.  She can be discharged home.  She is referred to local primary care.     Clark Rosenberg MD  08/14/22 [id number]

## 2022-08-31 ENCOUNTER — HOSPITAL ENCOUNTER (EMERGENCY)
Facility: HOSPITAL | Age: 47
Discharge: OTHER INSTITUTION | End: 2022-09-01
Attending: EMERGENCY MEDICINE
Payer: MEDICARE

## 2022-08-31 DIAGNOSIS — I10 HYPERTENSION: Primary | ICD-10-CM

## 2022-08-31 DIAGNOSIS — R07.89 CHEST DISCOMFORT: ICD-10-CM

## 2022-08-31 DIAGNOSIS — I50.9 CONGESTIVE HEART FAILURE, UNSPECIFIED HF CHRONICITY, UNSPECIFIED HEART FAILURE TYPE: ICD-10-CM

## 2022-08-31 DIAGNOSIS — I63.9 STROKE: ICD-10-CM

## 2022-08-31 LAB
ALBUMIN SERPL BCP-MCNC: 2.7 G/DL (ref 3.5–5.2)
ALP SERPL-CCNC: 74 U/L (ref 55–135)
ALT SERPL W/O P-5'-P-CCNC: 17 U/L (ref 10–44)
AMPHET+METHAMPHET UR QL: NEGATIVE
ANION GAP SERPL CALC-SCNC: 8 MMOL/L (ref 8–16)
AST SERPL-CCNC: 17 U/L (ref 10–40)
BARBITURATES UR QL SCN>200 NG/ML: NEGATIVE
BASOPHILS # BLD AUTO: 0.08 K/UL (ref 0–0.2)
BASOPHILS NFR BLD: 1.2 % (ref 0–1.9)
BENZODIAZ UR QL SCN>200 NG/ML: NEGATIVE
BILIRUB SERPL-MCNC: 0.3 MG/DL (ref 0.1–1)
BNP SERPL-MCNC: 131 PG/ML (ref 0–99)
BUN SERPL-MCNC: 12 MG/DL (ref 6–20)
BZE UR QL SCN: NEGATIVE
CALCIUM SERPL-MCNC: 9 MG/DL (ref 8.7–10.5)
CANNABINOIDS UR QL SCN: NEGATIVE
CHLORIDE SERPL-SCNC: 108 MMOL/L (ref 95–110)
CHOLEST SERPL-MCNC: 155 MG/DL (ref 120–199)
CHOLEST/HDLC SERPL: 2 {RATIO} (ref 2–5)
CO2 SERPL-SCNC: 26 MMOL/L (ref 23–29)
CREAT SERPL-MCNC: 0.9 MG/DL (ref 0.5–1.4)
CREAT UR-MCNC: 41.4 MG/DL (ref 15–325)
DIFFERENTIAL METHOD: ABNORMAL
EOSINOPHIL # BLD AUTO: 0.2 K/UL (ref 0–0.5)
EOSINOPHIL NFR BLD: 2.7 % (ref 0–8)
ERYTHROCYTE [DISTWIDTH] IN BLOOD BY AUTOMATED COUNT: 12.9 % (ref 11.5–14.5)
EST. GFR  (NO RACE VARIABLE): >60 ML/MIN/1.73 M^2
GLUCOSE SERPL-MCNC: 143 MG/DL (ref 70–110)
HCT VFR BLD AUTO: 30.2 % (ref 37–48.5)
HDLC SERPL-MCNC: 77 MG/DL (ref 40–75)
HDLC SERPL: 49.7 % (ref 20–50)
HGB BLD-MCNC: 10.3 G/DL (ref 12–16)
IMM GRANULOCYTES # BLD AUTO: 0.02 K/UL (ref 0–0.04)
IMM GRANULOCYTES NFR BLD AUTO: 0.3 % (ref 0–0.5)
LDLC SERPL CALC-MCNC: 47.2 MG/DL (ref 63–159)
LYMPHOCYTES # BLD AUTO: 2.2 K/UL (ref 1–4.8)
LYMPHOCYTES NFR BLD: 33.3 % (ref 18–48)
MCH RBC QN AUTO: 30.7 PG (ref 27–31)
MCHC RBC AUTO-ENTMCNC: 34.1 G/DL (ref 32–36)
MCV RBC AUTO: 90 FL (ref 82–98)
METHADONE UR QL SCN>300 NG/ML: NEGATIVE
MONOCYTES # BLD AUTO: 0.4 K/UL (ref 0.3–1)
MONOCYTES NFR BLD: 6.4 % (ref 4–15)
NEUTROPHILS # BLD AUTO: 3.7 K/UL (ref 1.8–7.7)
NEUTROPHILS NFR BLD: 56.1 % (ref 38–73)
NONHDLC SERPL-MCNC: 78 MG/DL
NRBC BLD-RTO: 0 /100 WBC
OPIATES UR QL SCN: NEGATIVE
PCP UR QL SCN>25 NG/ML: NEGATIVE
PLATELET # BLD AUTO: 295 K/UL (ref 150–450)
PMV BLD AUTO: 10.6 FL (ref 9.2–12.9)
POC PTINR: 1 (ref 0.9–1.2)
POC PTWBT: 11.7 SEC (ref 9.7–14.3)
POCT GLUCOSE: 140 MG/DL (ref 70–110)
POTASSIUM SERPL-SCNC: 3.8 MMOL/L (ref 3.5–5.1)
PROT SERPL-MCNC: 6 G/DL (ref 6–8.4)
RBC # BLD AUTO: 3.35 M/UL (ref 4–5.4)
SAMPLE: NORMAL
SARS-COV-2 RDRP RESP QL NAA+PROBE: NEGATIVE
SODIUM SERPL-SCNC: 142 MMOL/L (ref 136–145)
TOXICOLOGY INFORMATION: NORMAL
TRIGL SERPL-MCNC: 154 MG/DL (ref 30–150)
TROPONIN I SERPL DL<=0.01 NG/ML-MCNC: 0.01 NG/ML (ref 0–0.03)
TSH SERPL DL<=0.005 MIU/L-ACNC: 2.63 UIU/ML (ref 0.4–4)
WBC # BLD AUTO: 6.6 K/UL (ref 3.9–12.7)

## 2022-08-31 PROCEDURE — 84443 ASSAY THYROID STIM HORMONE: CPT | Performed by: PHYSICIAN ASSISTANT

## 2022-08-31 PROCEDURE — 80061 LIPID PANEL: CPT | Performed by: PHYSICIAN ASSISTANT

## 2022-08-31 PROCEDURE — 96365 THER/PROPH/DIAG IV INF INIT: CPT | Mod: 59

## 2022-08-31 PROCEDURE — 25000003 PHARM REV CODE 250: Performed by: PHYSICIAN ASSISTANT

## 2022-08-31 PROCEDURE — 85610 PROTHROMBIN TIME: CPT | Mod: 91 | Performed by: PHYSICIAN ASSISTANT

## 2022-08-31 PROCEDURE — 84484 ASSAY OF TROPONIN QUANT: CPT | Performed by: PHYSICIAN ASSISTANT

## 2022-08-31 PROCEDURE — U0002 COVID-19 LAB TEST NON-CDC: HCPCS | Performed by: PHYSICIAN ASSISTANT

## 2022-08-31 PROCEDURE — 99291 CRITICAL CARE FIRST HOUR: CPT | Mod: 25

## 2022-08-31 PROCEDURE — 63600175 PHARM REV CODE 636 W HCPCS: Performed by: PHYSICIAN ASSISTANT

## 2022-08-31 PROCEDURE — 82962 GLUCOSE BLOOD TEST: CPT

## 2022-08-31 PROCEDURE — 25500020 PHARM REV CODE 255

## 2022-08-31 PROCEDURE — 85610 PROTHROMBIN TIME: CPT

## 2022-08-31 PROCEDURE — 96376 TX/PRO/DX INJ SAME DRUG ADON: CPT

## 2022-08-31 PROCEDURE — 36415 COLL VENOUS BLD VENIPUNCTURE: CPT | Performed by: PHYSICIAN ASSISTANT

## 2022-08-31 PROCEDURE — 93010 EKG 12-LEAD: ICD-10-PCS | Mod: ,,, | Performed by: INTERNAL MEDICINE

## 2022-08-31 PROCEDURE — 99900035 HC TECH TIME PER 15 MIN (STAT)

## 2022-08-31 PROCEDURE — 93010 ELECTROCARDIOGRAM REPORT: CPT | Mod: ,,, | Performed by: INTERNAL MEDICINE

## 2022-08-31 PROCEDURE — 85025 COMPLETE CBC W/AUTO DIFF WBC: CPT | Performed by: PHYSICIAN ASSISTANT

## 2022-08-31 PROCEDURE — 93005 ELECTROCARDIOGRAM TRACING: CPT

## 2022-08-31 PROCEDURE — 96367 TX/PROPH/DG ADDL SEQ IV INF: CPT

## 2022-08-31 PROCEDURE — 80307 DRUG TEST PRSMV CHEM ANLYZR: CPT | Performed by: STUDENT IN AN ORGANIZED HEALTH CARE EDUCATION/TRAINING PROGRAM

## 2022-08-31 PROCEDURE — 83880 ASSAY OF NATRIURETIC PEPTIDE: CPT | Performed by: PHYSICIAN ASSISTANT

## 2022-08-31 PROCEDURE — 96375 TX/PRO/DX INJ NEW DRUG ADDON: CPT

## 2022-08-31 PROCEDURE — 80053 COMPREHEN METABOLIC PANEL: CPT | Performed by: PHYSICIAN ASSISTANT

## 2022-08-31 RX ORDER — CLONIDINE HYDROCHLORIDE 0.1 MG/1
0.1 TABLET ORAL
Status: DISCONTINUED | OUTPATIENT
Start: 2022-08-31 | End: 2022-09-01 | Stop reason: HOSPADM

## 2022-08-31 RX ORDER — ONDANSETRON 2 MG/ML
8 INJECTION INTRAMUSCULAR; INTRAVENOUS
Status: COMPLETED | OUTPATIENT
Start: 2022-08-31 | End: 2022-08-31

## 2022-08-31 RX ORDER — NICARDIPINE HYDROCHLORIDE 0.2 MG/ML
0-15 INJECTION INTRAVENOUS CONTINUOUS
Status: DISCONTINUED | OUTPATIENT
Start: 2022-08-31 | End: 2022-09-01 | Stop reason: HOSPADM

## 2022-08-31 RX ORDER — VALSARTAN 80 MG/1
160 TABLET ORAL
Status: DISCONTINUED | OUTPATIENT
Start: 2022-08-31 | End: 2022-09-01 | Stop reason: HOSPADM

## 2022-08-31 RX ORDER — KETOROLAC TROMETHAMINE 30 MG/ML
10 INJECTION, SOLUTION INTRAMUSCULAR; INTRAVENOUS
Status: COMPLETED | OUTPATIENT
Start: 2022-08-31 | End: 2022-08-31

## 2022-08-31 RX ORDER — HYDRALAZINE HYDROCHLORIDE 20 MG/ML
10 INJECTION INTRAMUSCULAR; INTRAVENOUS
Status: DISCONTINUED | OUTPATIENT
Start: 2022-08-31 | End: 2022-09-01 | Stop reason: HOSPADM

## 2022-08-31 RX ORDER — POTASSIUM CHLORIDE 750 MG/1
10 CAPSULE, EXTENDED RELEASE ORAL DAILY
COMMUNITY
Start: 2022-06-07

## 2022-08-31 RX ORDER — HYDRALAZINE HYDROCHLORIDE 20 MG/ML
10 INJECTION INTRAMUSCULAR; INTRAVENOUS
Status: COMPLETED | OUTPATIENT
Start: 2022-08-31 | End: 2022-08-31

## 2022-08-31 RX ORDER — LABETALOL HYDROCHLORIDE 5 MG/ML
10 INJECTION, SOLUTION INTRAVENOUS
Status: DISCONTINUED | OUTPATIENT
Start: 2022-08-31 | End: 2022-09-01 | Stop reason: HOSPADM

## 2022-08-31 RX ORDER — CLONIDINE HYDROCHLORIDE 0.1 MG/1
0.1 TABLET ORAL 2 TIMES DAILY
Status: ON HOLD | COMMUNITY
Start: 2022-07-15 | End: 2022-09-04 | Stop reason: HOSPADM

## 2022-08-31 RX ORDER — ZOLPIDEM TARTRATE 10 MG/1
10 TABLET ORAL NIGHTLY PRN
COMMUNITY
Start: 2022-07-12 | End: 2023-04-17

## 2022-08-31 RX ORDER — LABETALOL HYDROCHLORIDE 5 MG/ML
10 INJECTION, SOLUTION INTRAVENOUS
Status: COMPLETED | OUTPATIENT
Start: 2022-08-31 | End: 2022-08-31

## 2022-08-31 RX ORDER — SODIUM CHLORIDE 9 MG/ML
INJECTION, SOLUTION INTRAVENOUS
Status: COMPLETED | OUTPATIENT
Start: 2022-08-31 | End: 2022-08-31

## 2022-08-31 RX ORDER — ACETAMINOPHEN 500 MG
1000 TABLET ORAL
Status: COMPLETED | OUTPATIENT
Start: 2022-08-31 | End: 2022-08-31

## 2022-08-31 RX ORDER — SITAGLIPTIN AND METFORMIN HYDROCHLORIDE 50; 500 MG/1; MG/1
1 TABLET, FILM COATED, EXTENDED RELEASE ORAL DAILY
COMMUNITY
Start: 2022-07-10

## 2022-08-31 RX ORDER — FUROSEMIDE 20 MG/1
20 TABLET ORAL EVERY MORNING
COMMUNITY
Start: 2022-06-07 | End: 2023-05-29 | Stop reason: DRUGHIGH

## 2022-08-31 RX ORDER — FUROSEMIDE 10 MG/ML
40 INJECTION INTRAMUSCULAR; INTRAVENOUS
Status: COMPLETED | OUTPATIENT
Start: 2022-08-31 | End: 2022-08-31

## 2022-08-31 RX ADMIN — IOHEXOL 75 ML: 350 INJECTION, SOLUTION INTRAVENOUS at 07:08

## 2022-08-31 RX ADMIN — HYDRALAZINE HYDROCHLORIDE 10 MG: 20 INJECTION INTRAMUSCULAR; INTRAVENOUS at 05:08

## 2022-08-31 RX ADMIN — ALTEPLASE 59.5 MG: KIT at 09:08

## 2022-08-31 RX ADMIN — SODIUM CHLORIDE: 0.9 INJECTION, SOLUTION INTRAVENOUS at 09:08

## 2022-08-31 RX ADMIN — FUROSEMIDE 40 MG: 10 INJECTION, SOLUTION INTRAMUSCULAR; INTRAVENOUS at 05:08

## 2022-08-31 RX ADMIN — KETOROLAC TROMETHAMINE 10 MG: 30 INJECTION, SOLUTION INTRAMUSCULAR at 05:08

## 2022-08-31 RX ADMIN — NICARDIPINE HYDROCHLORIDE 5 MG/HR: 0.2 INJECTION, SOLUTION INTRAVENOUS at 08:08

## 2022-08-31 RX ADMIN — ACETAMINOPHEN 1000 MG: 500 TABLET ORAL at 09:08

## 2022-08-31 RX ADMIN — ONDANSETRON 8 MG: 2 INJECTION INTRAMUSCULAR; INTRAVENOUS at 07:08

## 2022-08-31 RX ADMIN — LABETALOL HYDROCHLORIDE 10 MG: 5 INJECTION INTRAVENOUS at 08:08

## 2022-08-31 NOTE — ED PROVIDER NOTES
"Encounter Date: 8/31/2022    SCRIBE #1 NOTE: I, Daysi Ross, am scribing for, and in the presence of,  Sanjana Dumont PA-C.     History     Chief Complaint   Patient presents with    Hypertension     Reports elevated BP at home of 212 systolic despite medications -- reports HA and intermittent CP.      Time seen by provider: 3:50 PM on 08/31/2022    Mayra Davis is a 46 y.o. female with a PMHx of CHF, blindness, MI in 2009, DM, and HTN presents to the ED for a medical evaluation after her BP at home today was 212 systolic. Patient has been taking her antihypertensive medications as prescribed and denies missing any doses recently. Notes she can tell that she is hypertensive because her "eyes get blurry and feel like they're going to pop out." She does report an intermittent, diffuse headache the past 6 days. Today, she woke up with a headache which has been constant since the onset. Patient also reports intermittent chest pain and shortness of breath. She has taken Aleve with minimal relief. Denies lower extremity swelling, n/v/d, or any other Sx at this time. Most recent hospital admission was 5/16/22 where patient was treated for CHF exacerbation and HTN. No pertinent PSHx.    The history is provided by the patient.   Review of patient's allergies indicates:   Allergen Reactions    Vancomycin analogues      No past medical history on file.  No past surgical history on file.  No family history on file.     Review of Systems   Constitutional:  Negative for chills and fever.   HENT:  Negative for facial swelling and trouble swallowing.    Eyes:  Positive for visual disturbance (pt is legally blind). Negative for discharge.   Respiratory:  Positive for shortness of breath. Negative for cough, chest tightness and wheezing.    Cardiovascular:  Positive for chest pain. Negative for palpitations and leg swelling.   Gastrointestinal:  Negative for abdominal pain, diarrhea, nausea and vomiting.   Genitourinary: "  Negative for dysuria and hematuria.   Musculoskeletal:  Negative for arthralgias, back pain, joint swelling, myalgias, neck pain and neck stiffness.   Skin:  Negative for color change, pallor, rash and wound.   Neurological:  Positive for headaches. Negative for dizziness, syncope, weakness, light-headedness and numbness.   Hematological:  Does not bruise/bleed easily.   Psychiatric/Behavioral:  The patient is not nervous/anxious.    All other systems reviewed and are negative.    Physical Exam     Initial Vitals [08/31/22 1523]   BP Pulse Resp Temp SpO2   (!) 192/80 92 18 98.6 °F (37 °C) 99 %      MAP       --         Physical Exam    Nursing note and vitals reviewed.  Constitutional: She appears well-developed and well-nourished. She is not diaphoretic. No distress.   HENT:   Head: Normocephalic and atraumatic.   Right Ear: External ear normal.   Left Ear: External ear normal.   Nose: Nose normal.   Eyes: Conjunctivae and EOM are normal.   Neck: Neck supple.   Normal range of motion.  Cardiovascular:  Normal rate, regular rhythm, normal heart sounds and intact distal pulses.           Pulmonary/Chest: Breath sounds normal. No respiratory distress. She has no wheezes. She has no rhonchi. She has no rales.   Abdominal: Abdomen is soft. She exhibits no distension. There is no abdominal tenderness.   Musculoskeletal:         General: No tenderness or edema. Normal range of motion.      Cervical back: Normal range of motion and neck supple.     Neurological: She is alert and oriented to person, place, and time. No sensory deficit.   7:04 PM  Decreased  strength noted to left upper extremity with some left upper extremity drifting.  Decreased strength to left lower extremity.    Skin: Skin is warm and dry. No rash and no abscess noted. No erythema.   Psychiatric: She has a normal mood and affect.       ED Course   Critical Care    Date/Time: 8/31/2022 8:58 PM  Performed by: David Anthony MD  Authorized by:  David Anthony MD   Direct patient critical care time: 14 minutes  Ordering / reviewing critical care time: 16 minutes  Documentation critical care time: 9 minutes  Total critical care time (exclusive of procedural time) : 39 minutes  Critical care was time spent personally by me on the following activities: development of treatment plan with patient or surrogate, evaluation of patient's response to treatment, examination of patient, obtaining history from patient or surrogate, ordering and performing treatments and interventions, ordering and review of laboratory studies and ordering and review of radiographic studies.      Labs Reviewed   CBC W/ AUTO DIFFERENTIAL - Abnormal; Notable for the following components:       Result Value    RBC 3.35 (*)     Hemoglobin 10.3 (*)     Hematocrit 30.2 (*)     All other components within normal limits   COMPREHENSIVE METABOLIC PANEL - Abnormal; Notable for the following components:    Glucose 143 (*)     Albumin 2.7 (*)     All other components within normal limits   B-TYPE NATRIURETIC PEPTIDE - Abnormal; Notable for the following components:     (*)     All other components within normal limits   LIPID PANEL - Abnormal; Notable for the following components:    Triglycerides 154 (*)     HDL 77 (*)     LDL Cholesterol 47.2 (*)     All other components within normal limits   POCT GLUCOSE - Abnormal; Notable for the following components:    POCT Glucose 140 (*)     All other components within normal limits   TROPONIN I   TSH   PROTIME-INR   DRUG SCREEN PANEL, URINE EMERGENCY   SARS-COV-2 RNA AMPLIFICATION, QUAL   POCT GLUCOSE, HAND-HELD DEVICE   ISTAT PROCEDURE   POCT PROTIME-INR     EKG Readings: (Independently Interpreted)   Initial Reading: No STEMI. Rhythm: Normal Sinus Rhythm. Heart Rate: 80. Ectopy: No Ectopy. Conduction: Normal. ST Segments: Normal ST Segments. T Waves: Normal. Clinical Impression: Normal Sinus Rhythm     Imaging Results              CTA Head  and Neck (xpd) (Final result)  Result time 08/31/22 21:06:25      Final result by Marsha Diaz MD (08/31/22 21:06:25)                   Impression:      No acute intracranial hemorrhage or evidence of major arterial infarct or mass effect.  Stable CT brain compared to prior exam earlier today 16:14.    Limited CTA exam secondary to insufficient arterial contrast as discussed above.  There is no convincing high-grade stenosis or major vessel occlusion.  Please see above discussion for additional detailed findings.      Electronically signed by: Marsha Diaz  Date:    08/31/2022  Time:    21:06               Narrative:    EXAMINATION:  CTA HEAD AND NECK (XPD)    CLINICAL HISTORY:  Neuro deficit, acute, stroke suspected;left sided upper extremity weakness;    TECHNIQUE:  CT angiogram was performed from the level of the harry to the top of the head following the IV administration of 75mL of Omnipaque 350.   Sagittal and coronal reconstructions and maximum intensity projection reconstructions were performed. Arterial stenosis percentages are based on NASCET measurement criteria.  Rapid AI was utilized to assess for hemorrhage  and large vessel occlusion.    Insufficient contrast was detected therefore rapid AI evaluation for large vessel occlusion is limited and there is also note that the veins are opacified to a greater degree than the arteries (venous contamination).    COMPARISON:  CT brain 08/31/2022 16:14    FINDINGS:  Intracranial Compartment:    Ventricles and sulci are normal in size for age without evidence of hydrocephalus.  There is no acute extra-axial hemorrhage or fluid collections.    The brain parenchyma appears normal with gray-white matter junction differentiation appearing intact.  There is no parenchymal mass, acute hemorrhage, edema, or major vascular distribution infarct.    Skull/Extracranial Contents (limited evaluation): No evidence of acute fracture.  Mastoid air cells and visualized  paranasal sinuses are essentially clear.    Non-Vascular Structures of the Neck/Thoracic Inlet (limited evaluation): There is a heterogeneous the enhancing thyroid gland without dominant focal nodule.  Visualized lungs are clear with no nodules.    Aorta: Normal 3 vessel arch.    Extracranial carotid circulation: No hemodynamically significant stenosis, aneurysmal dilatation, or dissection.    Extracranial vertebral circulation: Left vertebral artery is dominant.  There is no hemodynamically significant stenosis, aneurysmal dilatation, or dissection.    Intracranial Arteries: Veins are opacified to a greater degree than the arteries with perfusion evaluation limited for accuracy as a result.    There is no convincing large vessel occlusion or significant focal high-grade stenosis or aneurysm.  The posterior cerebral artery on the right appears to be partially supplied by the anterior circulation and the P1 segment is faintly visualized is at the origin appearing relatively diminutive running beneath a prominent vein crossing midline.    There is relatively asymmetric contrast opacification of the distal middle cerebral artery branches and along the sylvian fissure on the left as compared to the right however they are patent.  This is likely attributed to technical factors related to timing of the bolus and insufficient arterial contrast.    Venous structures (limited evaluation): Normal.                                       CT Head Without Contrast (Final result)  Result time 08/31/22 16:35:16      Final result by Phill Lange MD (08/31/22 16:35:16)                   Impression:      1.  There is no acute intracranial abnormality.  There is no hemorrhage, mass/mass effect, acute edema or ischemia. It should be noted that MRI is more sensitive in the detection of subtle or acute nonhemorrhagic ischemic disease.      Electronically signed by: Phill Lange MD  Date:    08/31/2022  Time:    16:35                Narrative:    EXAMINATION:  CT HEAD WITHOUT CONTRAST    CLINICAL HISTORY:  Headache, new or worsening, neuro deficit (Age 19-49y);mild left sided weakness;    TECHNIQUE:  Routine unenhanced axial images were obtained through the head.  Sagittal and coronal reformatted images were created.  The study is reviewed in bone and soft tissue windows.    COMPARISON:  None    FINDINGS:  Intracranial contents: There is no acute intracranial abnormality.  Brain volume, ventricular size and position are normal.  There is no hemorrhage or mass/mass effect.  There is no acute edema or ischemia.  The gray-white interface is preserved without obvious acute infarction.  There is no significant white matter disease.  There is no abnormal extra-axial fluid collection.  The basilar cisterns are open.  The cerebellar tonsils are in normal position at the level of the foramen magnum.  The patient has had prior lens replacement and glaucoma surgery.    Extracranial contents, calvarium, soft tissues: The calvarium is normal.  The included paranasal sinuses and mastoid air cells are clear.  Incomplete fusion of the posterior arch of C1 represents normal developmental variation.                                       X-Ray Chest 1 View (Final result)  Result time 08/31/22 16:47:18      Final result by Edi Tracy MD (08/31/22 16:47:18)                   Impression:      New mild focal opacity left mid lung which may represent early pneumonia in the appropriate clinical setting.      Electronically signed by: Edi Tracy MD  Date:    08/31/2022  Time:    16:47               Narrative:    EXAMINATION:  XR CHEST 1 VIEW    CLINICAL HISTORY:  Essential (primary) hypertension    TECHNIQUE:  Single frontal view of the chest was performed.    COMPARISON:  08/14/2022    FINDINGS:  The cardiomediastinal silhouette is within normal limits.  There is a new, mild focal airspace opacity in the left midlung.  No pleural effusion.                                        Medications   hydrALAZINE injection 10 mg (10 mg Intravenous Not Given 8/31/22 1943)   cloNIDine tablet 0.1 mg (0.1 mg Oral Not Given 8/31/22 2134)   valsartan tablet 160 mg (160 mg Oral Not Given 8/31/22 2015)   niCARdipine 40 mg/200 mL (0.2 mg/mL) infusion (5 mg/hr Intravenous Restarted 8/31/22 2227)   labetaloL injection 10 mg (has no administration in time range)   ketorolac injection 9.999 mg (9.999 mg Intravenous Given 8/31/22 1709)   furosemide injection 40 mg (40 mg Intravenous Given 8/31/22 1703)   hydrALAZINE injection 10 mg (10 mg Intravenous Given 8/31/22 1757)   iohexoL (OMNIPAQUE 350) 350 mg iodine/mL injection (75 mLs Intravenous Given 8/31/22 1934)   ondansetron injection 8 mg (8 mg Intravenous Given 8/31/22 1943)   acetaminophen tablet 1,000 mg (1,000 mg Oral Given 8/31/22 2134)   ALTEPLASE IV BOLUS FROM VIAL 6.6 mg (6.6 mg Intravenous Bolus from Bag 8/31/22 2111)   alteplase (ACtivase) injection 59.5 mg (0 mg Intravenous Stopped 8/31/22 2209)   0.9%  NaCl infusion ( Intravenous New Bag 8/31/22 2157)   labetaloL injection 10 mg (10 mg Intravenous Given 8/31/22 2038)     Medical Decision Making:   History:   Old Medical Records: I decided to obtain old medical records.  Old Records Summarized: records from clinic visits and records from previous admission(s).  Differential Diagnosis:   Hypertension   Acute intracranial bleed   CVA   ACS   Clinical Tests:   Lab Tests: Ordered and Reviewed  Radiological Study: Reviewed and Ordered  Medical Tests: Ordered and Reviewed     APC / Resident Notes:   Initial exam and evaluation of patient showed negative head CT with stable labs.  No elevated WBCs.  Normal electrolytes and renal function.  BP did respond to IV Hydralazine and pt was ready for discharge home to continue home medications.      7:00 PM  On re-evaluation just prior to discharge, patient noted some left arm tingling and sensation of weakness.  There is objective  weakness with  strength to left upper extremity.  Will initiate stroke code evaluation.      Tele-neuro recommends TPA with SBP <180. Pt is agreeable with TPA administration.  BP control achieved with IV Labetalol and Cardene titration.  Alteplase is initiated.      Transfer center is working on securing ICU placement, since we have no available ICU beds here at this facility.  [facility] is top [facility] for transfer location.   Scribe Attestation:   Scribe #1: I performed the above scribed service and the documentation accurately describes the services I performed. I attest to the accuracy of the note.    Attending Attestation:     Physician Attestation Statement for NP/PA:   I have conducted a face to face encounter with this patient in addition to the NP/PA, due to Medical Complexity    Other NP/PA Attestation Additions:    History of Present Illness: 46-year-old female presented for an elevated blood pressure.    Medical Decision Making: Initial differential diagnosis included but not limited to hypertensive urgency emergency, and medication noncompliance.  The patient's initial CT scan of her head showed no acute abnormalities and her labs showed no acute processes.  With the patient initially had treatment with dose of hydralazine, with improvement in blood pressure.  Just prior to discharge the patient's blood pressure did spike again here in the emergency department and the patient developed left-sided weakness.  The patient was emergently stroke activated when this happened.  And her blood pressure was aggressively treated with IV Cardene.  The patient was evaluated by Stroke Neurology who felt the patient needed IV tPA.  The patient was administered IV tPA.  The patient will require care in an ICU.  We do not have any ICU beds available at our facility.  She was transferred for care in an ICU setting.  I am in agreement with the physician assistant's  assessment, treatment, and plan of care.           ED  Course as of 08/31/22 2306   Wed Aug 31, 2022   1859 New left arm tingling and weakness on re-evaluation prior to discharge.  BP has increased back to 225/87.  Will initiate Stroke code work-up and cancel discharge.  [HS]   2047 Tele-stroke recommends TPA and patient is agreeable.  SPB goal is <180 prior to administration of TPA [HS]   2048 We have no ICU beds available and transfer center is notified.   [HS]   2136 Spoke with Dr. Arteaga [HS]   2136 Most recent BP is 174/76 with bolus of Alteplase and Cardene running.  [HS]      ED Course User Index  [HS] Sanjana Dumont PA-C           I, Sanjana Dumont PA-C, personally performed the services described in this documentation. All medical record entries made by the scribe were at my direction and in my presence.  I have reviewed the chart and agree that the record reflects my personal performance and is accurate and complete. Sanjana Dumont PA-C.  7:05 PM 08/31/2022    Clinical Impression:   Final diagnoses:  [R07.89] Chest discomfort  [I10] Hypertension (Primary)  [I50.9] Congestive heart failure, unspecified HF chronicity, unspecified heart failure type  [I63.9] Stroke        ED Disposition Condition    Transfer to Another Facility Stable                Sanjana Dumont PA-C  08/31/22 2145       David Anthony MD  08/31/22 2306

## 2022-09-01 ENCOUNTER — HOSPITAL ENCOUNTER (INPATIENT)
Facility: HOSPITAL | Age: 47
LOS: 3 days | Discharge: HOME | DRG: 305 | End: 2022-09-04
Attending: INTERNAL MEDICINE | Admitting: INTERNAL MEDICINE
Payer: MEDICARE

## 2022-09-01 ENCOUNTER — CLINICAL SUPPORT (OUTPATIENT)
Dept: CARDIOLOGY | Facility: HOSPITAL | Age: 47
DRG: 305 | End: 2022-09-01
Attending: STUDENT IN AN ORGANIZED HEALTH CARE EDUCATION/TRAINING PROGRAM
Payer: MEDICARE

## 2022-09-01 VITALS
HEART RATE: 82 BPM | SYSTOLIC BLOOD PRESSURE: 120 MMHG | DIASTOLIC BLOOD PRESSURE: 55 MMHG | HEIGHT: 64 IN | OXYGEN SATURATION: 96 % | BODY MASS INDEX: 27.67 KG/M2 | TEMPERATURE: 99 F | WEIGHT: 162.06 LBS | RESPIRATION RATE: 18 BRPM

## 2022-09-01 VITALS — WEIGHT: 162 LBS | BODY MASS INDEX: 27.66 KG/M2 | HEIGHT: 64 IN

## 2022-09-01 DIAGNOSIS — Z92.82 S/P ADMN TPA IN DIFF FAC W/N LAST 24 HR BEF ADM TO CRNT FAC: Primary | ICD-10-CM

## 2022-09-01 DIAGNOSIS — I63.9 STROKE: ICD-10-CM

## 2022-09-01 DIAGNOSIS — I63.9 CVA (CEREBRAL VASCULAR ACCIDENT): ICD-10-CM

## 2022-09-01 PROBLEM — D64.9 ANEMIA: Status: ACTIVE | Noted: 2022-09-01

## 2022-09-01 PROBLEM — I63.511 ACUTE ISCHEMIC RIGHT MCA STROKE: Status: ACTIVE | Noted: 2022-09-01

## 2022-09-01 PROBLEM — I10 HTN (HYPERTENSION): Status: ACTIVE | Noted: 2022-09-01

## 2022-09-01 LAB
AMPHET+METHAMPHET UR QL: NEGATIVE
ANION GAP SERPL CALC-SCNC: 5 MMOL/L (ref 8–16)
BACTERIA #/AREA URNS HPF: NEGATIVE /HPF
BARBITURATES UR QL SCN>200 NG/ML: NEGATIVE
BASOPHILS # BLD AUTO: 0.05 K/UL (ref 0–0.2)
BASOPHILS NFR BLD: 0.7 % (ref 0–1.9)
BENZODIAZ UR QL SCN>200 NG/ML: NEGATIVE
BILIRUB UR QL STRIP: NEGATIVE
BUN SERPL-MCNC: 16 MG/DL (ref 6–20)
BZE UR QL SCN: NEGATIVE
CALCIUM SERPL-MCNC: 8.2 MG/DL (ref 8.7–10.5)
CANNABINOIDS UR QL SCN: NEGATIVE
CHLORIDE SERPL-SCNC: 105 MMOL/L (ref 95–110)
CLARITY UR: CLEAR
CO2 SERPL-SCNC: 27 MMOL/L (ref 23–29)
COLOR UR: YELLOW
CREAT SERPL-MCNC: 1 MG/DL (ref 0.5–1.4)
CREAT UR-MCNC: 68 MG/DL (ref 15–325)
DIFFERENTIAL METHOD: ABNORMAL
EOSINOPHIL # BLD AUTO: 0.2 K/UL (ref 0–0.5)
EOSINOPHIL NFR BLD: 2.8 % (ref 0–8)
ERYTHROCYTE [DISTWIDTH] IN BLOOD BY AUTOMATED COUNT: 13 % (ref 11.5–14.5)
EST. GFR  (NO RACE VARIABLE): >60 ML/MIN/1.73 M^2
ESTIMATED AVG GLUCOSE: 157 MG/DL (ref 68–131)
ETHANOL SERPL-MCNC: <5 MG/DL
FERRITIN SERPL-MCNC: 99 NG/ML (ref 20–300)
FOLATE SERPL-MCNC: 13.2 NG/ML (ref 4–24)
GLUCOSE SERPL-MCNC: 192 MG/DL (ref 70–110)
GLUCOSE SERPL-MCNC: 193 MG/DL (ref 70–110)
GLUCOSE UR QL STRIP: ABNORMAL
HBA1C MFR BLD: 7.1 % (ref 4.5–6.2)
HCT VFR BLD AUTO: 26.8 % (ref 37–48.5)
HGB BLD-MCNC: 8.9 G/DL (ref 12–16)
HGB UR QL STRIP: ABNORMAL
HYALINE CASTS #/AREA URNS LPF: 3 /LPF
IMM GRANULOCYTES # BLD AUTO: 0.03 K/UL (ref 0–0.04)
IMM GRANULOCYTES NFR BLD AUTO: 0.4 % (ref 0–0.5)
INR PPP: 1 (ref 0.8–1.2)
IRON SERPL-MCNC: 38 UG/DL (ref 30–160)
KETONES UR QL STRIP: NEGATIVE
LEUKOCYTE ESTERASE UR QL STRIP: NEGATIVE
LYMPHOCYTES # BLD AUTO: 2.2 K/UL (ref 1–4.8)
LYMPHOCYTES NFR BLD: 32.5 % (ref 18–48)
MAGNESIUM SERPL-MCNC: 1.6 MG/DL (ref 1.6–2.6)
MCH RBC QN AUTO: 29.9 PG (ref 27–31)
MCHC RBC AUTO-ENTMCNC: 33.2 G/DL (ref 32–36)
MCV RBC AUTO: 90 FL (ref 82–98)
MICROSCOPIC COMMENT: ABNORMAL
MONOCYTES # BLD AUTO: 0.4 K/UL (ref 0.3–1)
MONOCYTES NFR BLD: 6.4 % (ref 4–15)
NEUTROPHILS # BLD AUTO: 3.9 K/UL (ref 1.8–7.7)
NEUTROPHILS NFR BLD: 57.2 % (ref 38–73)
NITRITE UR QL STRIP: NEGATIVE
NRBC BLD-RTO: 0 /100 WBC
OPIATES UR QL SCN: NEGATIVE
PCP UR QL SCN>25 NG/ML: NEGATIVE
PH UR STRIP: 6 [PH] (ref 5–8)
PHOSPHATE SERPL-MCNC: 4.5 MG/DL (ref 2.7–4.5)
PLATELET # BLD AUTO: 256 K/UL (ref 150–450)
PMV BLD AUTO: 10.6 FL (ref 9.2–12.9)
POTASSIUM SERPL-SCNC: 3.3 MMOL/L (ref 3.5–5.1)
PROT UR QL STRIP: ABNORMAL
PROTHROMBIN TIME: 10.3 SEC (ref 9–12.5)
RBC # BLD AUTO: 2.98 M/UL (ref 4–5.4)
RBC #/AREA URNS HPF: 2 /HPF (ref 0–4)
SATURATED IRON: 17 % (ref 20–50)
SODIUM SERPL-SCNC: 137 MMOL/L (ref 136–145)
SP GR UR STRIP: 1.03 (ref 1–1.03)
SQUAMOUS #/AREA URNS HPF: 1 /HPF
TOTAL IRON BINDING CAPACITY: 221 UG/DL (ref 250–450)
TOXICOLOGY INFORMATION: NORMAL
TRANSFERRIN SERPL-MCNC: 158 MG/DL (ref 200–375)
URN SPEC COLLECT METH UR: ABNORMAL
UROBILINOGEN UR STRIP-ACNC: NEGATIVE EU/DL
VIT B12 SERPL-MCNC: 640 PG/ML (ref 210–950)
WBC # BLD AUTO: 6.84 K/UL (ref 3.9–12.7)
WBC #/AREA URNS HPF: 2 /HPF (ref 0–5)

## 2022-09-01 PROCEDURE — 63600175 PHARM REV CODE 636 W HCPCS: Performed by: INTERNAL MEDICINE

## 2022-09-01 PROCEDURE — 83036 HEMOGLOBIN GLYCOSYLATED A1C: CPT | Performed by: INTERNAL MEDICINE

## 2022-09-01 PROCEDURE — 36415 COLL VENOUS BLD VENIPUNCTURE: CPT | Performed by: INTERNAL MEDICINE

## 2022-09-01 PROCEDURE — 93306 TTE W/DOPPLER COMPLETE: CPT

## 2022-09-01 PROCEDURE — G0425 PR INPT TELEHEALTH CONSULT 30M: ICD-10-PCS | Mod: 95,,, | Performed by: STUDENT IN AN ORGANIZED HEALTH CARE EDUCATION/TRAINING PROGRAM

## 2022-09-01 PROCEDURE — 83735 ASSAY OF MAGNESIUM: CPT | Performed by: INTERNAL MEDICINE

## 2022-09-01 PROCEDURE — 20000000 HC ICU ROOM

## 2022-09-01 PROCEDURE — 93306 TTE W/DOPPLER COMPLETE: CPT | Mod: 26,,, | Performed by: INTERNAL MEDICINE

## 2022-09-01 PROCEDURE — 84466 ASSAY OF TRANSFERRIN: CPT | Performed by: INTERNAL MEDICINE

## 2022-09-01 PROCEDURE — 81001 URINALYSIS AUTO W/SCOPE: CPT | Performed by: INTERNAL MEDICINE

## 2022-09-01 PROCEDURE — 25000003 PHARM REV CODE 250: Performed by: INTERNAL MEDICINE

## 2022-09-01 PROCEDURE — 94799 UNLISTED PULMONARY SVC/PX: CPT

## 2022-09-01 PROCEDURE — 84100 ASSAY OF PHOSPHORUS: CPT | Performed by: INTERNAL MEDICINE

## 2022-09-01 PROCEDURE — G0425 INPT/ED TELECONSULT30: HCPCS | Mod: 95,,, | Performed by: STUDENT IN AN ORGANIZED HEALTH CARE EDUCATION/TRAINING PROGRAM

## 2022-09-01 PROCEDURE — 94761 N-INVAS EAR/PLS OXIMETRY MLT: CPT

## 2022-09-01 PROCEDURE — 93306 ECHO (CUPID ONLY): ICD-10-PCS | Mod: 26,,, | Performed by: INTERNAL MEDICINE

## 2022-09-01 PROCEDURE — 85025 COMPLETE CBC W/AUTO DIFF WBC: CPT | Performed by: INTERNAL MEDICINE

## 2022-09-01 PROCEDURE — 25000003 PHARM REV CODE 250: Performed by: STUDENT IN AN ORGANIZED HEALTH CARE EDUCATION/TRAINING PROGRAM

## 2022-09-01 PROCEDURE — 82746 ASSAY OF FOLIC ACID SERUM: CPT | Performed by: INTERNAL MEDICINE

## 2022-09-01 PROCEDURE — 82077 ASSAY SPEC XCP UR&BREATH IA: CPT | Performed by: INTERNAL MEDICINE

## 2022-09-01 PROCEDURE — 92523 SPEECH SOUND LANG COMPREHEN: CPT

## 2022-09-01 PROCEDURE — 92610 EVALUATE SWALLOWING FUNCTION: CPT

## 2022-09-01 PROCEDURE — 82962 GLUCOSE BLOOD TEST: CPT

## 2022-09-01 PROCEDURE — 80307 DRUG TEST PRSMV CHEM ANLYZR: CPT | Performed by: INTERNAL MEDICINE

## 2022-09-01 PROCEDURE — 99900035 HC TECH TIME PER 15 MIN (STAT)

## 2022-09-01 PROCEDURE — 80048 BASIC METABOLIC PNL TOTAL CA: CPT | Performed by: INTERNAL MEDICINE

## 2022-09-01 PROCEDURE — 99900031 HC PATIENT EDUCATION (STAT)

## 2022-09-01 PROCEDURE — 25000003 PHARM REV CODE 250

## 2022-09-01 PROCEDURE — 82728 ASSAY OF FERRITIN: CPT | Performed by: INTERNAL MEDICINE

## 2022-09-01 PROCEDURE — 82607 VITAMIN B-12: CPT | Performed by: INTERNAL MEDICINE

## 2022-09-01 RX ORDER — CALCIUM GLUCONATE 20 MG/ML
1 INJECTION, SOLUTION INTRAVENOUS
Status: DISCONTINUED | OUTPATIENT
Start: 2022-09-01 | End: 2022-09-04 | Stop reason: HOSPADM

## 2022-09-01 RX ORDER — CARVEDILOL 25 MG/1
25 TABLET ORAL 2 TIMES DAILY
Status: DISCONTINUED | OUTPATIENT
Start: 2022-09-01 | End: 2022-09-04 | Stop reason: HOSPADM

## 2022-09-01 RX ORDER — NICARDIPINE HYDROCHLORIDE 0.2 MG/ML
0-15 INJECTION INTRAVENOUS CONTINUOUS
Status: DISCONTINUED | OUTPATIENT
Start: 2022-09-01 | End: 2022-09-03

## 2022-09-01 RX ORDER — SODIUM,POTASSIUM PHOSPHATES 280-250MG
2 POWDER IN PACKET (EA) ORAL
Status: DISCONTINUED | OUTPATIENT
Start: 2022-09-01 | End: 2022-09-04 | Stop reason: HOSPADM

## 2022-09-01 RX ORDER — LANOLIN ALCOHOL/MO/W.PET/CERES
800 CREAM (GRAM) TOPICAL
Status: DISCONTINUED | OUTPATIENT
Start: 2022-09-01 | End: 2022-09-04 | Stop reason: HOSPADM

## 2022-09-01 RX ORDER — ATORVASTATIN CALCIUM 40 MG/1
40 TABLET, FILM COATED ORAL NIGHTLY
Status: DISCONTINUED | OUTPATIENT
Start: 2022-09-01 | End: 2022-09-04 | Stop reason: HOSPADM

## 2022-09-01 RX ORDER — CHLORHEXIDINE GLUCONATE ORAL RINSE 1.2 MG/ML
15 SOLUTION DENTAL 2 TIMES DAILY
Status: DISCONTINUED | OUTPATIENT
Start: 2022-09-01 | End: 2022-09-04 | Stop reason: HOSPADM

## 2022-09-01 RX ORDER — NICARDIPINE HYDROCHLORIDE 0.2 MG/ML
INJECTION INTRAVENOUS
Status: DISPENSED
Start: 2022-09-01 | End: 2022-09-01

## 2022-09-01 RX ORDER — NAPROXEN SODIUM 220 MG/1
81 TABLET, FILM COATED ORAL DAILY
Status: DISCONTINUED | OUTPATIENT
Start: 2022-09-02 | End: 2022-09-04 | Stop reason: HOSPADM

## 2022-09-01 RX ORDER — ACETAMINOPHEN 325 MG/1
650 TABLET ORAL EVERY 6 HOURS PRN
Status: DISCONTINUED | OUTPATIENT
Start: 2022-09-01 | End: 2022-09-04 | Stop reason: HOSPADM

## 2022-09-01 RX ORDER — CALCIUM GLUCONATE 20 MG/ML
2 INJECTION, SOLUTION INTRAVENOUS
Status: DISCONTINUED | OUTPATIENT
Start: 2022-09-01 | End: 2022-09-04 | Stop reason: HOSPADM

## 2022-09-01 RX ORDER — CALCIUM GLUCONATE 20 MG/ML
3 INJECTION, SOLUTION INTRAVENOUS
Status: DISCONTINUED | OUTPATIENT
Start: 2022-09-01 | End: 2022-09-04 | Stop reason: HOSPADM

## 2022-09-01 RX ORDER — POTASSIUM CHLORIDE 7.45 MG/ML
60 INJECTION INTRAVENOUS
Status: DISCONTINUED | OUTPATIENT
Start: 2022-09-01 | End: 2022-09-04 | Stop reason: HOSPADM

## 2022-09-01 RX ORDER — ZOLPIDEM TARTRATE 5 MG/1
10 TABLET ORAL NIGHTLY PRN
Status: DISCONTINUED | OUTPATIENT
Start: 2022-09-01 | End: 2022-09-04 | Stop reason: HOSPADM

## 2022-09-01 RX ORDER — POTASSIUM CHLORIDE 7.45 MG/ML
40 INJECTION INTRAVENOUS
Status: DISCONTINUED | OUTPATIENT
Start: 2022-09-01 | End: 2022-09-04 | Stop reason: HOSPADM

## 2022-09-01 RX ORDER — POTASSIUM CHLORIDE 7.45 MG/ML
80 INJECTION INTRAVENOUS
Status: DISCONTINUED | OUTPATIENT
Start: 2022-09-01 | End: 2022-09-04 | Stop reason: HOSPADM

## 2022-09-01 RX ORDER — MAGNESIUM SULFATE HEPTAHYDRATE 40 MG/ML
4 INJECTION, SOLUTION INTRAVENOUS
Status: DISCONTINUED | OUTPATIENT
Start: 2022-09-01 | End: 2022-09-04 | Stop reason: HOSPADM

## 2022-09-01 RX ORDER — MAGNESIUM SULFATE HEPTAHYDRATE 40 MG/ML
2 INJECTION, SOLUTION INTRAVENOUS
Status: DISCONTINUED | OUTPATIENT
Start: 2022-09-01 | End: 2022-09-04 | Stop reason: HOSPADM

## 2022-09-01 RX ORDER — CLOPIDOGREL BISULFATE 75 MG/1
75 TABLET ORAL DAILY
Status: DISCONTINUED | OUTPATIENT
Start: 2022-09-02 | End: 2022-09-04 | Stop reason: HOSPADM

## 2022-09-01 RX ORDER — MUPIROCIN 20 MG/G
OINTMENT TOPICAL 2 TIMES DAILY
Status: DISCONTINUED | OUTPATIENT
Start: 2022-09-01 | End: 2022-09-04 | Stop reason: HOSPADM

## 2022-09-01 RX ORDER — VALSARTAN 80 MG/1
160 TABLET ORAL 2 TIMES DAILY
Status: DISCONTINUED | OUTPATIENT
Start: 2022-09-01 | End: 2022-09-04 | Stop reason: HOSPADM

## 2022-09-01 RX ADMIN — MUPIROCIN 1 G: 20 OINTMENT TOPICAL at 09:09

## 2022-09-01 RX ADMIN — MUPIROCIN 1 G: 20 OINTMENT TOPICAL at 11:09

## 2022-09-01 RX ADMIN — ACETAMINOPHEN 650 MG: 325 TABLET ORAL at 11:09

## 2022-09-01 RX ADMIN — VALSARTAN 160 MG: 80 TABLET, FILM COATED ORAL at 11:09

## 2022-09-01 RX ADMIN — ACETAMINOPHEN 650 MG: 325 TABLET ORAL at 04:09

## 2022-09-01 RX ADMIN — POTASSIUM BICARBONATE 35 MEQ: 391 TABLET, EFFERVESCENT ORAL at 06:09

## 2022-09-01 RX ADMIN — NICARDIPINE HYDROCHLORIDE 5 MG/HR: 0.2 INJECTION INTRAVENOUS at 04:09

## 2022-09-01 RX ADMIN — NICARDIPINE HYDROCHLORIDE 5 MG/HR: 0.2 INJECTION INTRAVENOUS at 10:09

## 2022-09-01 RX ADMIN — ZOLPIDEM TARTRATE 10 MG: 5 TABLET, COATED ORAL at 10:09

## 2022-09-01 RX ADMIN — CARVEDILOL 25 MG: 25 TABLET, FILM COATED ORAL at 09:09

## 2022-09-01 RX ADMIN — ACETAMINOPHEN 650 MG: 325 TABLET ORAL at 10:09

## 2022-09-01 RX ADMIN — ATORVASTATIN CALCIUM 40 MG: 40 TABLET, FILM COATED ORAL at 09:09

## 2022-09-01 RX ADMIN — CHLORHEXIDINE GLUCONATE 15 ML: 1.2 RINSE ORAL at 09:09

## 2022-09-01 RX ADMIN — VALSARTAN 160 MG: 80 TABLET, FILM COATED ORAL at 09:09

## 2022-09-01 RX ADMIN — CHLORHEXIDINE GLUCONATE 15 ML: 1.2 RINSE ORAL at 11:09

## 2022-09-01 RX ADMIN — MAGNESIUM SULFATE 2 G: 2 INJECTION INTRAVENOUS at 06:09

## 2022-09-01 RX ADMIN — CARVEDILOL 25 MG: 25 TABLET, FILM COATED ORAL at 11:09

## 2022-09-01 NOTE — PT/OT/SLP PROGRESS
Physical Therapy      Patient Name:  Mayra Davis   MRN:  [id number]    Patient not seen today secondary to Other (Comment) (Pt on 24 hour hold for therapy due to having TPA administered that ended at 22:09 on 8/31.). Will follow-up 9/2/22.

## 2022-09-01 NOTE — CONSULTS
[facility]  Neurology Consult    Patient Name: Mayra Davis   MRN: [id number]  : 1975  TODAY'S DATE: 2022  ADMIT DATE: 2022  1:54 AM                                          CONSULTED PROVIDER: Charisma Villegas MD, Neurologist. Cellphone: [phone]  CONSULT REQUESTED BY: Livier Metz MD     No chief complaint on file.       HPI per EMR:  46 years old female with past medical history of COVID-19 infection 2 and half months ago heart failure with ejection fraction 65% on echo  diabetes not using insulin at home hypertension legally blind she was transferred from outside facility because she was having headaches for the past 3 days also though be partially relieved with Aleve she also noticed that she was having hypertension so she decided consult the ER when she arrived to the ER she states she was having episodes of chest pain with left arm pain and tingling as and he was noticed in the ER that she was having pronator drift and left lower extremity weakness as well is a so she received CT head CTA head and neck and tPA she was also hypertensive so she received hydralazine clonidine valsartan labetalol and ultimately was started on nicardipine drip.  She states that with medications her chest pain improved the chest pain had radiation to her back.  She was transferred to our facility due to in absence of ICU beds at outside facility.    Neurology Consult  Patient was seen and examined by me today.  She is a 46-year-old woman with complicated past medical history of heart failure, diabetes which is uncontrolled, hypertension, legally blind, who presented initially to the emergency room for evaluation of persistent headaches for 3 days.  She does have a monitor at home and measured her blood pressure which was in the 200s over 100s.  Patient reports compliance with her blood pressure medications at home, so she got concerned that the medications were not controlling  her blood pressure so she presented to the emergency room.  While in the emergency room she began to complain about chest pain, left arm numbness and weakness, so a stroke activation was called.  Patient was evaluated by tele stroke and was given tPA after blood pressure was controlled, so she was transferred to the ICU for monitoring.    Review of Systems:    A comprehensive ROS was performed and all systems were negative except as noted above in HPI.    Past Medical History:  has no past medical history on file.    Past Surgical History:  has no past surgical history on file.    Family Medical History: family history is not on file.    Social History:      PCP: Teresita Darden MD    Allergies:   Review of patient's allergies indicates:   Allergen Reactions    Vancomycin analogues Rash       Medications:   Current Facility-Administered Medications on File Prior to Encounter   Medication Dose Route Frequency Provider Last Rate Last Admin    [COMPLETED] 0.9%  NaCl infusion   Intravenous ED 1 Time Sanjana Dumont PA-C 60 mL/hr at 08/31/22 2157 New Bag at 08/31/22 2157    [COMPLETED] acetaminophen tablet 1,000 mg  1,000 mg Oral ED 1 Time ANKIT Kebede-C   1,000 mg at 08/31/22 2134    [COMPLETED] alteplase (ACtivase) injection 59.5 mg  0.81 mg/kg Intravenous Once Sanjana Dumont PA-C   Stopped at 08/31/22 2209    [COMPLETED] ALTEPLASE IV BOLUS FROM VIAL 6.6 mg  0.09 mg/kg Intravenous Once ANKIT Kebede-C   6.6 mg at 08/31/22 2111    [COMPLETED] furosemide injection 40 mg  40 mg Intravenous ED 1 Time ANKIT Kebede-C   40 mg at 08/31/22 1703    [COMPLETED] hydrALAZINE injection 10 mg  10 mg Intravenous ED 1 Time ANKIT Kebede-C   10 mg at 08/31/22 1757    [COMPLETED] iohexoL (OMNIPAQUE 350) 350 mg iodine/mL injection        75 mL at 08/31/22 1934    [COMPLETED] ketorolac injection 9.999 mg  9.999 mg Intravenous ED 1 Time ANKIT Kebede-C   9.999 mg at 08/31/22  1709    [COMPLETED] labetaloL injection 10 mg  10 mg Intravenous ED 1 Time Sanjana Dumont PA-C   10 mg at 08/31/22 2038    [COMPLETED] ondansetron injection 8 mg  8 mg Intravenous ED 1 Time Sanjana Dumont PA-C   8 mg at 08/31/22 1943    [DISCONTINUED] cloNIDine tablet 0.1 mg  0.1 mg Oral ED 1 Time Sanjana Dumont PA-C        [DISCONTINUED] hydrALAZINE injection 10 mg  10 mg Intravenous ED 1 Time Sanjana Dumont PA-C        [DISCONTINUED] labetaloL injection 10 mg  10 mg Intravenous ED 1 Time Sanjana Dumont PA-C        [DISCONTINUED] niCARdipine 40 mg/200 mL (0.2 mg/mL) infusion  0-15 mg/hr Intravenous Continuous Sanjana Dumont PA-C 12.5 mL/hr at 08/31/22 2300 2.5 mg/hr at 08/31/22 2300    [DISCONTINUED] valsartan tablet 160 mg  160 mg Oral ED 1 Time Sanjana Dumont PA-C         Current Outpatient Medications on File Prior to Encounter   Medication Sig Dispense Refill    ALPRAZolam (XANAX) 1 MG tablet Take 1 mg by mouth 3 (three) times daily as needed.      atorvastatin (LIPITOR) 20 MG tablet Take 1 tablet (20 mg total) by mouth once daily. 30 tablet 2    busPIRone (BUSPAR) 7.5 MG tablet Take 7.5 mg by mouth 3 (three) times daily.      cloNIDine (CATAPRES) 0.1 MG tablet Take 0.1 mg by mouth 2 (two) times daily.      COMBIGAN 0.2-0.5 % Drop Place into both eyes.      furosemide (LASIX) 20 MG tablet Take 20 mg by mouth every morning.      furosemide (LASIX) 40 MG tablet Take 1 tablet (40 mg total) by mouth once daily. 30 tablet 2    glipiZIDE (GLUCOTROL) 10 MG tablet Take 10 mg by mouth 2 (two) times daily.      hydrALAZINE (APRESOLINE) 25 MG tablet Take 1 tablet (25 mg total) by mouth 3 (three) times daily. 90 tablet 2    JANUMET XR  mg TM24 Take 1 tablet by mouth once daily.      lamoTRIgine 50 mg TR24 Take 1 tablet by mouth once daily.      metFORMIN (GLUCOPHAGE) 500 MG tablet Take 2 tablets (1,000 mg total) by mouth 2 (two) times daily with meals. 120 tablet 2     omeprazole (PRILOSEC) 20 MG capsule Take 20 mg by mouth once daily.      OXcarbazepine (TRILEPTAL) 150 MG Tab Take 150 mg by mouth 2 (two) times daily.      pantoprazole (PROTONIX) 40 MG tablet Take 40 mg by mouth once daily.      potassium chloride (MICRO-K) 10 MEQ CpSR Take 10 mEq by mouth once daily.      prednisoLONE acetate (PRED FORTE) 1 % DrpS Place 1 drop into both eyes 4 (four) times daily.      QUEtiapine (SEROQUEL) 25 MG Tab Take 25 mg by mouth nightly.      TRESIBA FLEXTOUCH U-100 100 unit/mL (3 mL) insulin pen Inject into the skin.      valsartan (DIOVAN) 160 MG tablet Take 1 tablet (160 mg total) by mouth 2 (two) times daily. 60 tablet 2    VITAMIN D3 25 mcg (1,000 unit) tablet Take 1,000 Units by mouth once daily.      zolpidem (AMBIEN) 10 mg Tab Take 10 mg by mouth nightly as needed.      [DISCONTINUED] losartan (COZAAR) 50 MG tablet Take 100 mg by mouth once daily.      [DISCONTINUED] pravastatin (PRAVACHOL) 20 MG tablet Take 20 mg by mouth once daily.       Scheduled Meds:   atorvastatin  40 mg Oral QHS    carvediloL  25 mg Oral BID    chlorhexidine  15 mL Mouth/Throat BID    mupirocin   Nasal BID    valsartan  160 mg Oral BID     Continuous Infusions:   niCARdipine Stopped (09/01/22 1100)     PRN Meds:.acetaminophen, calcium gluconate IVPB, calcium gluconate IVPB, calcium gluconate IVPB, magnesium oxide, magnesium oxide, magnesium sulfate IVPB, magnesium sulfate IVPB, potassium bicarbonate, potassium bicarbonate, potassium bicarbonate, potassium chloride **AND** potassium chloride **AND** potassium chloride, potassium, sodium phosphates, potassium, sodium phosphates, potassium, sodium phosphates, sodium phosphate IVPB, sodium phosphate IVPB, sodium phosphate IVPB, zolpidem      Physical Exam  Current Vitals:  Vitals:    09/01/22 0902   BP:    Pulse: 79   Resp: 15   Temp:        Physical Exam:  General: AO x4  HEENT: PERRL, EOMI  CV: RRR  Lungs:  No respiratory distress  Abdomen:  +BS    Neurological Exam    MENTAL STATUS EXAM:  Level of alertness: Alert  Level of attention: Attentive w/out deficit  Orientation/Awareness: intact to person, place, time, situation  Language: fluent. Comprehension/repetition/naming intact    CRANIAL NERVE EXAM:  II/III: fundoscopic exam deferred, PERRL; patient is legally blind due to severe retinopathy in both eyes as well as glaucoma  III/IV/VI: EOMI  V: no deficits appreciated to light touch  VII: no facial asymmetry noted  VIII: no deficits in hearing bilaterally  IX/X: palate @ ML and raises symmetrically  XI: shoulder shrug 5/5 bilaterally; head turn 5/5 bilaterally  XII: tongue to midline w/out asymmetry  No dysarthria noted on exam.    MOTOR EXAM:  Bulk and Tone: normal throughout  Strength is 5/5 proximally and distally in upper and lower extremities without deficits.    REFLEXES:  Masseter (CN V) Not Tested  1+ in bilateral upper and lower extremities, no Nye's, no clonus. Downgoing plantars.    SENSORY EXAM  Light touch intact throughout.    COORDINATION/CEREBELLAR EXAM:  FTN: no dysmetria or other signs of appendicular ataxia  HTS: No evidence of appendicular ataxia    GAIT:  Deferred for safety.    NIH Stroke Scale      Date: 09/01/2022  Time: 1345  Person Administering Scale: Charisma Villegas MD    Administer stroke scale items in the order listed. Record performance in each category after each subscale exam. Do not go back and change scores. Follow directions provided for each exam technique. Scores should reflect what the patient does, not what the clinician thinks the patient can do. The clinician should record answers while administering the exam and work quickly. Except where indicated, the patient should not be coached (i.e., repeated requests to patient to make a special effort).      1a  Level of consciousness: 0=alert; keenly responsive   1b. LOC questions:  0=Answers both tasks correctly   1c. LOC commands: 0=Answers both tasks  correctly   2.  Best Gaze: 0=normal   3.  Visual: 0=No visual loss   4. Facial Palsy: 0=Normal symmetric movement   5a.  Motor left arm: 0=No drift, limb holds 90 (or 45) degrees for full 10 seconds   5b.  Motor right arm: 0=No drift, limb holds 90 (or 45) degrees for full 10 seconds   6a. motor left le=No drift, limb holds 90 (or 45) degrees for full 10 seconds   6b  Motor right le=No drift, limb holds 90 (or 45) degrees for full 10 seconds   7. Limb Ataxia: 0=Absent   8.  Sensory: 0=Normal; no sensory loss   9. Best Language:  0=No aphasia, normal   10. Dysarthria: 0=Normal   11. Extinction and Inattention: 0=No abnormality    Total:   0       Laboratory Data & Studies    Recent Labs   Lab 22  1607 22  0430   WBC 6.60 6.84   HGB 10.3* 8.9*    256   MCV 90 90       Recent Labs   Lab 22  1607 22  0430    137   K 3.8 3.3*    105   CO2 26 27   BUN 12 16   * 193*   CALCIUM 9.0 8.2*   MG  --  1.6   PHOS  --  4.5       Recent Labs   Lab 22  1607   PROT 6.0   ALBUMIN 2.7*   BILITOT 0.3   AST 17   ALT 17   ALKPHOS 74       No results for input(s): LABPT, INR, APTT in the last 168 hours.    Recent Labs   Lab 22  1912 22  0430   HGBA1C  --  7.1*   CHOL 155  --    TRIG 154*  --    LDLCALC 47.2*  --    HDL 77*  --    TSH 2.628  --        Microbiology:  Microbiology Results (last 7 days)       Procedure Component Value Units Date/Time    Culture, Respiratory with Gram Stain [[id number]]     Order Status: No result Specimen: Respiratory             Imaging:  X-Ray Chest 1 View    Result Date: 2022  EXAMINATION: XR CHEST 1 VIEW CLINICAL HISTORY: Essential (primary) hypertension TECHNIQUE: Single frontal view of the chest was performed. COMPARISON: 2022 FINDINGS: The cardiomediastinal silhouette is within normal limits.  There is a new, mild focal airspace opacity in the left midlung.  No pleural effusion.     New mild focal opacity left mid  lung which may represent early pneumonia in the appropriate clinical setting. Electronically signed by: Edi Tracy MD Date:    08/31/2022 Time:    16:47    X-Ray Chest PA And Lateral    Result Date: 8/14/2022  EXAMINATION: XR CHEST PA AND LATERAL CLINICAL HISTORY: coughing; TECHNIQUE: PA and lateral views of the chest were performed. COMPARISON: 05/16/2022 FINDINGS: The lungs are clear, with normal appearance of pulmonary vasculature and no pleural effusion or pneumothorax. The cardiac silhouette is normal in size. The hilar and mediastinal contours are unremarkable. Bones are intact.     No acute abnormality. Electronically signed by: Radha Bealsey MD Date:    08/14/2022 Time:    12:58    CT Head Without Contrast    Result Date: 8/31/2022  EXAMINATION: CT HEAD WITHOUT CONTRAST CLINICAL HISTORY: Headache, new or worsening, neuro deficit (Age 19-49y);mild left sided weakness; TECHNIQUE: Routine unenhanced axial images were obtained through the head.  Sagittal and coronal reformatted images were created.  The study is reviewed in bone and soft tissue windows. COMPARISON: None FINDINGS: Intracranial contents: There is no acute intracranial abnormality.  Brain volume, ventricular size and position are normal.  There is no hemorrhage or mass/mass effect.  There is no acute edema or ischemia.  The gray-white interface is preserved without obvious acute infarction.  There is no significant white matter disease.  There is no abnormal extra-axial fluid collection.  The basilar cisterns are open.  The cerebellar tonsils are in normal position at the level of the foramen magnum.  The patient has had prior lens replacement and glaucoma surgery. Extracranial contents, calvarium, soft tissues: The calvarium is normal.  The included paranasal sinuses and mastoid air cells are clear.  Incomplete fusion of the posterior arch of C1 represents normal developmental variation.     1.  There is no acute intracranial  abnormality.  There is no hemorrhage, mass/mass effect, acute edema or ischemia. It should be noted that MRI is more sensitive in the detection of subtle or acute nonhemorrhagic ischemic disease. Electronically signed by: Phill Lange MD Date:    08/31/2022 Time:    16:35    CTA Head and Neck (xpd)    Result Date: 8/31/2022  EXAMINATION: CTA HEAD AND NECK (XPD) CLINICAL HISTORY: Neuro deficit, acute, stroke suspected;left sided upper extremity weakness; TECHNIQUE: CT angiogram was performed from the level of the harry to the top of the head following the IV administration of 75mL of Omnipaque 350.   Sagittal and coronal reconstructions and maximum intensity projection reconstructions were performed. Arterial stenosis percentages are based on NASCET measurement criteria.  Rapid AI was utilized to assess for hemorrhage  and large vessel occlusion. Insufficient contrast was detected therefore rapid AI evaluation for large vessel occlusion is limited and there is also note that the veins are opacified to a greater degree than the arteries (venous contamination). COMPARISON: CT brain 08/31/2022 16:14 FINDINGS: Intracranial Compartment: Ventricles and sulci are normal in size for age without evidence of hydrocephalus.  There is no acute extra-axial hemorrhage or fluid collections. The brain parenchyma appears normal with gray-white matter junction differentiation appearing intact.  There is no parenchymal mass, acute hemorrhage, edema, or major vascular distribution infarct. Skull/Extracranial Contents (limited evaluation): No evidence of acute fracture.  Mastoid air cells and visualized paranasal sinuses are essentially clear. Non-Vascular Structures of the Neck/Thoracic Inlet (limited evaluation): There is a heterogeneous the enhancing thyroid gland without dominant focal nodule.  Visualized lungs are clear with no nodules. Aorta: Normal 3 vessel arch. Extracranial carotid circulation: No hemodynamically  significant stenosis, aneurysmal dilatation, or dissection. Extracranial vertebral circulation: Left vertebral artery is dominant.  There is no hemodynamically significant stenosis, aneurysmal dilatation, or dissection. Intracranial Arteries: Veins are opacified to a greater degree than the arteries with perfusion evaluation limited for accuracy as a result. There is no convincing large vessel occlusion or significant focal high-grade stenosis or aneurysm.  The posterior cerebral artery on the right appears to be partially supplied by the anterior circulation and the P1 segment is faintly visualized is at the origin appearing relatively diminutive running beneath a prominent vein crossing midline. There is relatively asymmetric contrast opacification of the distal middle cerebral artery branches and along the sylvian fissure on the left as compared to the right however they are patent.  This is likely attributed to technical factors related to timing of the bolus and insufficient arterial contrast. Venous structures (limited evaluation): Normal.     No acute intracranial hemorrhage or evidence of major arterial infarct or mass effect.  Stable CT brain compared to prior exam earlier today 16:14. Limited CTA exam secondary to insufficient arterial contrast as discussed above.  There is no convincing high-grade stenosis or major vessel occlusion.  Please see above discussion for additional detailed findings. Electronically signed by: Marsha Diaz Date:    08/31/2022 Time:    21:06      Assessment:    Acute ischemic stroke averted s/p tPA vs TIA  46-year-old woman with complicated past medical history of heart failure, diabetes which is uncontrolled, hypertension, legally blind, who presented initially to the emergency room for evaluation of persistent headaches and uncontrolled hypertension for 3 days.  On her arrival to the emergency room her blood pressure was in the 220s/110s, and while in the ED, she complained  of chest pain, left arm numbness and weakness and was stroke activated.  She received tPA and was transferred to the ICU.  NIH stroke scale is 0.  Symptoms have resolved.  MRI brain was negative for stroke.     Stroke workup:  CTH:  No acute intracranial abnormality  Repeat CT head 24 hours tPA: Ordered and pending  CTA head and neck:  No large vessel occlusion  MRI brain:  Negative for acute stroke  Risk factors: A1C, TSH, LDL  Echocardiogram:  Ordered and pending    Plan:  - Admitted to hospital medicine in the ICU with q1 hour neuro checks, on telemetry, continuous pulse oximetry  - Diet after eval per SLP.  - Secondary stroke prevention with atorvastatin 40 mg daily.  Repeat CT brain status post tPA 24 hours still pending.  If negative, will start ASA 81 mg daily, plavix 75 mg daily.  Patient should be continued on DAPT with both ASA and plavix for 3 weeks, then stop plavix and continue ASA monotherapy and statin  - Risk factor stratification with HgbA1C, Fasting Lipid profile, TSH  - 2D echocardiogram as above  - Maintain Euthermia with Tylenol prn temp > 37.2 degrees C.  - Assessment for rehab with PT/OT/SLP evaluation and treatment.  - BP Goal <180/105 s/p iv tPA: cardene gtt to titrate, and prn Labetalol  - Cardiac enzymes and EKG   - Will follow peripherally. Please call with questions or concerns.    Patient was counseled in stroke signs, symptoms and risk factors. She was instructed to call [phone] immediately if experiencing acute neurological deficits.  Patient counseled in healthy diet and physical activity. Importance of medication adherence and follow-up were emphasized.    DVT prophylaxis with chemo/SCD prophylaxis  Extensively discussed lifestyle modifications as prophylactic measures for stroke prevention including smoking cessation, adequate blood pressure management, healthy diet and regular exercise.    Patient to follow up with [facility] at [phone] within 3 days from discharge.      Stroke education was provided including stroke risk factors modification and any acute neurological changes including weakness, confusion, visual changes to come straight to the ER.     All questions were answered.                              Thank you kindly for including us in the care of this patient. Please do not hesitate to contact us with any questions.    Critical Care:  65 minutes of critical care time has been spent evaluating with the patient. Time includes chart review not limited to diagnostic imaging, labs, and vitals, patient assessment, discussion with family and nursing, current order evaluations, and new order entries.      Charisma Villegas MD  Neurology

## 2022-09-01 NOTE — ASSESSMENT & PLAN NOTE
Admit to ICU monitoring by tPA protocol  Will continue to follow-up neurology recommendations case discussed with neurologist on the case Dr. Hampton input appreciated   A1c vitamin B12  PT OT speech therapy  MRI of the head echo   SCDs for DVT prophylaxis  Drug screen UA alcohol level   Continue with nicardipine drip as needed for blood pressure ago after tPA   Sputum cultures sputum g stain   Continue with home medications of valsartan 160 mg b.i.d. Coreg 25 mg b.i.d.

## 2022-09-01 NOTE — PT/OT/SLP EVAL
Speech Language Pathology Evaluation  Cognitive/Bedside Swallow    Patient Name:  Mayra Davis   MRN:  [id number]  Admitting Diagnosis: S/P admn tPA in diff fac w/n last 24 hr bef adm to crnt fac    Recommendations:                  General Recommendations:  Follow-up not indicated  Diet recommendations:  Regular Diet - IDDSI Level 7, Thin   Aspiration Precautions: Standard aspiration precautions   General Precautions: Standard, vision impaired, fall  Communication strategies:  none    History:     No past medical history on file.    No past surgical history on file.    Social History: Patient lives with family.    Prior Intubation HX:  none this admission    Modified Barium Swallow: none in Epic    Chest X-Rays: New mild focal opacity left mid lung which may represent early pneumonia in the appropriate clinical setting    Prior diet: regular textures & liquids.    Subjective     Im doing about like usual  Patient goals: home         Objective:     Cognitive Status:  Legally blind.  Alert, Cooperative, Oriented x4.  Intact immediate, delayed and long term memory.  No attention deficits noted.  Intact calculation for time/money.    Reading Cognitive Assessment - Blind (MoCA-B) score -  18 out of 22 indicating intact cognitive-linguistic status       Receptive Language/Comprehension: intact    Pragmatics:  intact    Expressive Language: intact      Motor Speech: WFL    Voice: WFL    Visual-Spatial: CNT due to vision deficits    Reading: not tested     Written Expression: not tested     Oral Musculature Evaluation  Oral Musculature: WNL  Dentition: present and adequate (some missing molars)  Secretion Management: adequate  Mucosal Quality: good  Mandibular Strength and Mobility: WNL  Oral Labial Strength and Mobility: WNL  Lingual Strength and Mobility: WNL  Velar Elevation: WNL  Buccal Strength and Mobility: WNL  Volitional Cough: strong  Voice Prior to PO Intake: clear, no dysarthria    Bedside Swallow Eval:    Consistencies Assessed:  Thin liquids via cup & straw, 3 oz water challenge  Mixed consistencies peaches in thin juice  Solids reid cracker      Oral Phase:   WNL    Pharyngeal Phase:   no overt clinical signs/symptoms of aspiration  no overt clinical signs/symptoms of pharyngeal dysphagia    Compensatory Strategies  None    Treatment: Education re impressions & recommendations    Assessment:     Mayra Davis is a 46 y.o. female with an SLP diagnosis of  no [id number] deficits.  Recommend continue regular textures IDDSI 7, and thin liquids.  No tx..    Goals:   Multidisciplinary Problems       SLP Goals       Not on file                    Plan:     Patient to be seen:      Plan of Care expires:     Plan of Care reviewed with:  patient   SLP Follow-Up:  No       Discharge recommendations:      Barriers to Discharge:  None    Time Tracking:     SLP Treatment Date:   09/01/22  Speech Start Time:  1627  Speech Stop Time:  1645     Speech Total Time (min):  18 min    Billable Minutes: Eval 10  and Eval Swallow and Oral Function 8    09/01/2022

## 2022-09-01 NOTE — PLAN OF CARE
Problem: Adult Inpatient Plan of Care  Goal: Plan of Care Review  Outcome: Ongoing, Progressing  Goal: Patient-Specific Goal (Individualized)  Outcome: Ongoing, Progressing  Goal: Absence of Hospital-Acquired Illness or Injury  Outcome: Ongoing, Progressing  Goal: Optimal Comfort and Wellbeing  Outcome: Ongoing, Progressing  Goal: Readiness for Transition of Care  Outcome: Ongoing, Progressing     Problem: Adjustment to Illness (Stroke, Ischemic/Transient Ischemic Attack)  Goal: Optimal Coping  Outcome: Ongoing, Progressing     Problem: Bowel Elimination Impaired (Stroke, Ischemic/Transient Ischemic Attack)  Goal: Effective Bowel Elimination  Outcome: Ongoing, Progressing     Problem: Cerebral Tissue Perfusion (Stroke, Ischemic/Transient Ischemic Attack)  Goal: Optimal Cerebral Tissue Perfusion  Outcome: Ongoing, Progressing     Problem: Sensorimotor Impairment (Stroke, Ischemic/Transient Ischemic Attack)  Goal: Improved Sensorimotor Function  Outcome: Ongoing, Progressing     Problem: Fall Injury Risk  Goal: Absence of Fall and Fall-Related Injury  Outcome: Ongoing, Progressing

## 2022-09-01 NOTE — H&P
[facility]  History & Physical    Patient Name: Mayra Davis  MRN: [id number]  Patient Class: IP- Inpatient  Admission Date: 9/1/2022  Attending Physician: Edouard Qureshi MD   Primary Care Provider: Teresita Darden MD         Patient information was obtained from patient, past medical records and ER records.     Subjective:     Principal Problem:S/P admn tPA in diff fac w/n last 24 hr bef adm to crnt fac    Chief Complaint: No chief complaint on file.       HPI: 46 years old female with past medical history of COVID-19 infection 2 and half months ago heart failure with ejection fraction 65% on echo 2022 diabetes not using insulin at home hypertension legally blind she was transferred from outside facility because she was having headaches for the past 3 days also though be partially relieved with Aleve she also noticed that she was having hypertension so she decided consult the ER when she arrived to the ER she states she was having episodes of chest pain with left arm pain and tingling as and he was noticed in the ER that she was having pronator drift and left lower extremity weakness as well is a so she received CT head CTA head and neck and tPA she was also hypertensive so she received hydralazine clonidine valsartan labetalol and ultimately was started on nicardipine drip.  She states that with medications her chest pain improved the chest pain had radiation to her back.  She was transferred to our facility due to in absence of ICU beds at outside facility.      No past medical history on file.    No past surgical history on file.    Review of patient's allergies indicates:   Allergen Reactions    Vancomycin analogues Rash       Current Facility-Administered Medications on File Prior to Encounter   Medication    [COMPLETED] 0.9%  NaCl infusion    [COMPLETED] acetaminophen tablet 1,000 mg    [COMPLETED] alteplase (ACtivase) injection 59.5 mg    [COMPLETED] ALTEPLASE IV  BOLUS FROM VIAL 6.6 mg    [COMPLETED] furosemide injection 40 mg    [COMPLETED] hydrALAZINE injection 10 mg    [COMPLETED] iohexoL (OMNIPAQUE 350) 350 mg iodine/mL injection    [COMPLETED] ketorolac injection 9.999 mg    [COMPLETED] labetaloL injection 10 mg    [COMPLETED] ondansetron injection 8 mg    [DISCONTINUED] cloNIDine tablet 0.1 mg    [DISCONTINUED] hydrALAZINE injection 10 mg    [DISCONTINUED] labetaloL injection 10 mg    [DISCONTINUED] niCARdipine 40 mg/200 mL (0.2 mg/mL) infusion    [DISCONTINUED] valsartan tablet 160 mg     Current Outpatient Medications on File Prior to Encounter   Medication Sig    ALPRAZolam (XANAX) 1 MG tablet Take 1 mg by mouth 3 (three) times daily as needed.    atorvastatin (LIPITOR) 20 MG tablet Take 1 tablet (20 mg total) by mouth once daily.    busPIRone (BUSPAR) 7.5 MG tablet Take 7.5 mg by mouth 3 (three) times daily.    cloNIDine (CATAPRES) 0.1 MG tablet Take 0.1 mg by mouth 2 (two) times daily.    COMBIGAN 0.2-0.5 % Drop Place into both eyes.    furosemide (LASIX) 20 MG tablet Take 20 mg by mouth every morning.    furosemide (LASIX) 40 MG tablet Take 1 tablet (40 mg total) by mouth once daily.    glipiZIDE (GLUCOTROL) 10 MG tablet Take 10 mg by mouth 2 (two) times daily.    hydrALAZINE (APRESOLINE) 25 MG tablet Take 1 tablet (25 mg total) by mouth 3 (three) times daily.    JANUMET XR  mg TM24 Take 1 tablet by mouth once daily.    lamoTRIgine 50 mg TR24 Take 1 tablet by mouth once daily.    metFORMIN (GLUCOPHAGE) 500 MG tablet Take 2 tablets (1,000 mg total) by mouth 2 (two) times daily with meals.    omeprazole (PRILOSEC) 20 MG capsule Take 20 mg by mouth once daily.    OXcarbazepine (TRILEPTAL) 150 MG Tab Take 150 mg by mouth 2 (two) times daily.    pantoprazole (PROTONIX) 40 MG tablet Take 40 mg by mouth once daily.    potassium chloride (MICRO-K) 10 MEQ CpSR Take 10 mEq by mouth once daily.    prednisoLONE acetate (PRED FORTE) 1 % DrpS  Place 1 drop into both eyes 4 (four) times daily.    QUEtiapine (SEROQUEL) 25 MG Tab Take 25 mg by mouth nightly.    TRESIBA FLEXTOUCH U-100 100 unit/mL (3 mL) insulin pen Inject into the skin.    valsartan (DIOVAN) 160 MG tablet Take 1 tablet (160 mg total) by mouth 2 (two) times daily.    VITAMIN D3 25 mcg (1,000 unit) tablet Take 1,000 Units by mouth once daily.    zolpidem (AMBIEN) 10 mg Tab Take 10 mg by mouth nightly as needed.    [DISCONTINUED] losartan (COZAAR) 50 MG tablet Take 100 mg by mouth once daily.    [DISCONTINUED] pravastatin (PRAVACHOL) 20 MG tablet Take 20 mg by mouth once daily.     Family History    None       Tobacco Use    Smoking status: Not on file    Smokeless tobacco: Not on file   Substance and Sexual Activity    Alcohol use: Not on file    Drug use: Not on file    Sexual activity: Not on file     Review of Systems   Reason unable to perform ROS: Ten point system review pertinent positives and negatives are present HPI.   Objective:     Vital Signs (Most Recent):  Temp: 98.1 °F (36.7 °C) (09/01/22 0409)  Pulse: 82 (09/01/22 0230)  Resp: (!) 24 (09/01/22 0230)  BP: (!) 126/57 (09/01/22 0230)  SpO2: 97 % (09/01/22 0230)   Vital Signs (24h Range):  Temp:  [97.9 °F (36.6 °C)-98.6 °F (37 °C)] 98.1 °F (36.7 °C)  Pulse:  [76-92] 82  Resp:  [18-37] 24  SpO2:  [95 %-100 %] 97 %  BP: (107-225)/(55-98) 126/57     Weight: 73.5 kg (162 lb 0.6 oz)  Body mass index is 27.81 kg/m².    Physical Exam  Constitutional:       General: She is not in acute distress.     Appearance: Normal appearance. She is not ill-appearing, toxic-appearing or diaphoretic.   HENT:      Head: Normocephalic and atraumatic.      Nose: Nose normal.   Cardiovascular:      Rate and Rhythm: Normal rate and regular rhythm.      Heart sounds: Normal heart sounds. No murmur heard.    No friction rub. No gallop.   Pulmonary:      Effort: Pulmonary effort is normal. No respiratory distress.      Breath sounds: Normal breath  sounds. No stridor. No wheezing, rhonchi or rales.   Abdominal:      General: Abdomen is flat. Bowel sounds are normal. There is no distension.      Palpations: Abdomen is soft.      Tenderness: There is no abdominal tenderness. There is no guarding or rebound.      Hernia: No hernia is present.   Musculoskeletal:         General: No swelling, tenderness, deformity or signs of injury.      Cervical back: Neck supple.      Right lower leg: No edema.      Left lower leg: No edema.   Neurological:      Mental Status: She is alert.      Comments: No pronator drift at the time of my examination  Awake alert oriented follows commands moves 4 extremities symmetrically bilaterally  Strength 5/5 throughout           Significant Labs: All pertinent labs within the past 24 hours have been reviewed.  A1C:   Recent Labs   Lab 05/17/22  0328   HGBA1C 8.2*     CBC:   Recent Labs   Lab 08/31/22  1607   WBC 6.60   HGB 10.3*   HCT 30.2*        CMP:   Recent Labs   Lab 08/31/22  1607      K 3.8      CO2 26   *   BUN 12   CREATININE 0.9   CALCIUM 9.0   PROT 6.0   ALBUMIN 2.7*   BILITOT 0.3   ALKPHOS 74   AST 17   ALT 17   ANIONGAP 8     Lipid Panel:   Recent Labs   Lab 08/31/22  1912   CHOL 155   HDL 77*   LDLCALC 47.2*   TRIG 154*   CHOLHDL 49.7     Magnesium: No results for input(s): MG in the last 48 hours.  Troponin:   Recent Labs   Lab 08/31/22  1607   TROPONINI 0.007     TSH:   Recent Labs   Lab 08/31/22  1912   TSH 2.628     Urine Culture: No results for input(s): LABURIN in the last 48 hours.  Urine Studies: No results for input(s): COLORU, APPEARANCEUA, PHUR, SPECGRAV, PROTEINUA, GLUCUA, KETONESU, BILIRUBINUA, OCCULTUA, NITRITE, UROBILINOGEN, LEUKOCYTESUR, RBCUA, WBCUA, BACTERIA, SQUAMEPITHEL, HYALINECASTS in the last 48 hours.    Invalid input(s): WRIGHTSUR    Significant Imaging: I have reviewed all pertinent imaging results/findings within the past 24 hours.    Assessment/Plan:     * S/P admn tPA  in diff fac w/n last 24 hr bef adm to crnt fac  Admit to ICU monitoring by tPA protocol  Will continue to follow-up neurology recommendations case discussed with neurologist on the case Dr. Hampton input appreciated   A1c vitamin B12  PT OT speech therapy  MRI of the head echo   SCDs for DVT prophylaxis  Drug screen UA alcohol level   Continue with nicardipine drip as needed for blood pressure ago after tPA   Sputum cultures sputum g stain   Continue with home medications of valsartan 160 mg b.i.d. Coreg 25 mg b.i.d.    Anemia    Normocitic.  Fe/B12/FA levles  FOBT  [id number] may need colonosocpyh as outpatinet.      VTE Risk Mitigation (From admission, onward)         Ordered     Place sequential compression device  Until discontinued         09/01/22 0356                   Edouard Qureshi MD  Department of Hospital Medicine   [facility]

## 2022-09-01 NOTE — HPI
46 years old female with past medical history of COVID-19 infection 2 and half months ago heart failure with ejection fraction 65% on echo 2022 diabetes not using insulin at home hypertension legally blind she was transferred from outside facility because she was having headaches for the past 3 days also though be partially relieved with Aleve she also noticed that she was having hypertension so she decided consult the ER when she arrived to the ER she states she was having episodes of chest pain with left arm pain and tingling as and he was noticed in the ER that she was having pronator drift and left lower extremity weakness as well is a so she received CT head CTA head and neck and tPA she was also hypertensive so she received hydralazine clonidine valsartan labetalol and ultimately was started on nicardipine drip.  She states that with medications her chest pain improved the chest pain had radiation to her back.  She was transferred to our facility due to in absence of ICU beds at outside facility.

## 2022-09-01 NOTE — SUBJECTIVE & OBJECTIVE
Review of Systems   Constitutional:  Negative for activity change and fatigue.   Respiratory:  Negative for chest tightness and shortness of breath.    Cardiovascular:  Negative for chest pain and palpitations.   Gastrointestinal:  Negative for abdominal pain.   Musculoskeletal:  Negative for back pain and gait problem.   Neurological:  Negative for dizziness, syncope, facial asymmetry and weakness.   All other systems reviewed and are negative.  Objective:     Vital Signs (Most Recent):  Temp: 98.1 °F (36.7 °C) (09/01/22 0409)  Pulse: 84 (09/01/22 1245)  Resp: 12 (09/01/22 1245)  BP: (!) 157/69 (09/01/22 1245)  SpO2: 100 % (09/01/22 1245)   Vital Signs (24h Range):  Temp:  [97.9 °F (36.6 °C)-98.6 °F (37 °C)] 98.1 °F (36.7 °C)  Pulse:  [76-92] 84  Resp:  [12-51] 12  SpO2:  [94 %-100 %] 100 %  BP: (107-225)/(50-98) 157/69     Weight: 73.5 kg (162 lb 0.6 oz)  Body mass index is 27.81 kg/m².  No intake or output data in the 24 hours ending 09/01/22 1440   Physical Exam  Vitals and nursing note reviewed.   HENT:      Head: Normocephalic.      Mouth/Throat:      Mouth: Mucous membranes are moist.   Eyes:      Pupils: Pupils are equal, round, and reactive to light.   Cardiovascular:      Rate and Rhythm: Normal rate.      Pulses: Normal pulses.   Pulmonary:      Effort: Pulmonary effort is normal.   Abdominal:      Palpations: Abdomen is soft.   Musculoskeletal:         General: Normal range of motion.      Cervical back: Normal range of motion.   Skin:     General: Skin is warm.   Neurological:      General: No focal deficit present.      Mental Status: She is alert and oriented to person, place, and time. Mental status is at baseline.       Significant Labs: All pertinent labs within the past 24 hours have been reviewed.  BMP:   Recent Labs   Lab 09/01/22  0430   *      K 3.3*      CO2 27   BUN 16   CREATININE 1.0   CALCIUM 8.2*   MG 1.6     CBC:   Recent Labs   Lab 08/31/22  1607 09/01/22  0430   WBC  6.60 6.84   HGB 10.3* 8.9*   HCT 30.2* 26.8*    256     CMP:   Recent Labs   Lab 08/31/22  1607 09/01/22  0430    137   K 3.8 3.3*    105   CO2 26 27   * 193*   BUN 12 16   CREATININE 0.9 1.0   CALCIUM 9.0 8.2*   PROT 6.0  --    ALBUMIN 2.7*  --    BILITOT 0.3  --    ALKPHOS 74  --    AST 17  --    ALT 17  --    ANIONGAP 8 5*     Cardiac Markers:   Recent Labs   Lab 08/31/22  1607   *     Troponin:   Recent Labs   Lab 08/31/22  1607   TROPONINI 0.007       Significant Imaging: I have reviewed all pertinent imaging results/findings within the past 24 hours.

## 2022-09-01 NOTE — CARE UPDATE
09/01/22 0902   Patient Assessment/Suction   Level of Consciousness (AVPU) alert   Respiratory Effort Normal;Unlabored   Expansion/Accessory Muscles/Retractions no use of accessory muscles   All Lung Fields Breath Sounds clear;diminished   Rhythm/Pattern, Respiratory unlabored   PRE-TX-O2   O2 Device (Oxygen Therapy) room air   SpO2 98 %   Pulse Oximetry Type Continuous   $ Pulse Oximetry - Multiple Charge Pulse Oximetry - Multiple   Pulse 79   Resp 15   Education   $ Education Other (see comment);15 min  (SATS)   Respiratory Evaluation   $ Care Plan Tech Time 15 min   $ Eval/Re-eval Charges Evaluation

## 2022-09-01 NOTE — PT/OT/SLP PROGRESS
Occupational Therapy      Patient Name:  Mayra Davis   MRN:  [id number]    Patient not seen today secondary to Other (Comment) (HOLD--received TPA @ 2209 (8/31/22), 24hr hold required before proceeding with evaluation). Will follow-up next service date.    9/1/2022

## 2022-09-01 NOTE — CONSULTS
[facility]  Vascular Neurology  Comprehensive Stroke Center  TeleVascular Neurology Acute Consultation Note      Consults    Consulting Provider: ALEXANDER COTTO  Current Providers  No providers found    Patient Location:  [facility] EMERGENCY DEPARTMENT Emergency Department  Spoke hospital nurse at bedside with patient assisting consultant.     Patient information was obtained from patient, spouse/SO, and primary team.         Assessment/Plan:       Diagnoses:   Acute ischemic right MCA stroke  rec tPA   No LVO     Antithrombotics for secondary stroke prevention: Antiplatelets: None: Hold all Antithrombotics x 24 hours after IV t-PA administration    Statins for secondary stroke prevention and hyperlipidemia, if present:   Statins: Atorvastatin- 40 mg daily    Aggressive risk factor modification: HTN     Rehab efforts: The patient has been evaluated by a stroke team provider and the therapy needs have been fully considered based off the presenting complaints and exam findings. The following therapy evaluations are needed: PT evaluate and treat, OT evaluate and treat, SLP evaluate and treat, PM&R evaluate for appropriate placement    Diagnostics ordered/pending: CTA Head to assess vasculature , CTA Neck/Arch to assess vasculature, HgbA1C to assess blood glucose levels, Lipid Profile to assess cholesterol levels, MRI head without contrast to assess brain parenchyma, TTE to assess cardiac function/status , TSH to assess thyroid function    VTE prophylaxis: Mechanical prophylaxis: Place SCDs    BP parameters: Infarct: Post tPA, SBP <180            STROKE DOCUMENTATION     Acute Stroke Times:   Acute Stroke Times   Last Known Normal Date: 08/31/22  Last Known Normal Time: 1700  Symptom Onset Date: 08/31/22  Symptom Onset Time: 1700  Stroke Team Called Date: 08/31/22  Stroke Team Called Time: 1912  Stroke Team Arrival Date: 08/31/22  Stroke Team Arrival Time: 1916  CT Interpretation  "Time: 2000  Alteplase Recommended: Yes  Thrombectomy Recommended: No  Decision to Treat Time for Alteplase: 2000 (Challenges at spoke to get patient to CT scanner and later telestroke cart was occupied with Psych consult. )    NIH Scale:  Interval: baseline  1a. Level of Consciousness: 0-->Alert, keenly responsive  1b. LOC Questions: 0-->Answers both questions correctly  1c. LOC Commands: 0-->Performs both tasks correctly  2. Best Gaze: 0-->Normal  3. Visual: 0-->No visual loss  4. Facial Palsy: 0-->Normal symmetrical movements  5a. Motor Arm, Left: 1-->Drift, limb holds 90 (or 45) degrees, but drifts down before full 10 seconds, does not hit bed or other support  5b. Motor Arm, Right: 0-->No drift, limb holds 90 (or 45) degrees for full 10 secs  6a. Motor Leg, Left: 2-->Some effort against gravity, leg falls to bed by 5 secs, but has some effort against gravity  6b. Motor Leg, Right: 0-->No drift, leg holds 30 degree position for full 5 secs  7. Limb Ataxia: 0-->Absent  8. Sensory: 1-->Mild-to-moderate sensory loss, patient feels pinprick is less sharp or is dull on the affected side, or there is a loss of superficial pain with pinprick, but patient is aware of being touched  9. Best Language: 0-->No aphasia, normal  10. Dysarthria: 0-->Normal  11. Extinction and Inattention (formerly Neglect): 0-->No abnormality  Total (NIH Stroke Scale): 4     Modified Lea    Roxanne Coma Scale:    ABCD2 Score:    [id number] Score:   HAS -BLED Score:   ICH Score:   Hunt & Chopra Classification:       Blood pressure (!) 120/55, pulse 82, temperature 98.6 °F (37 °C), temperature source Oral, resp. rate 18, height 5' 4" (1.626 m), weight 73.5 kg (162 lb 0.6 oz), SpO2 96 %, not currently breastfeeding.  Alteplase Eligible?: Yes  Alteplase Recommendation:   Alteplase Total Dose:   Total dose: Alteplase 0.9mg/kg (max dose:90mg)                      ** based on acquired weight from facility   Bolus Dose:   10% of total Alteplase dose " given intravenously over 1 minute   Continuous Infusion Dose:   Remaining 90% of total Alteplase dose infused intravenously over 60 minutes    **infusion must start at the same time as the bolus dose     Additional Recommendations:   Neurological assessment and vital signs (except temperature) every 15 minutes during Altaplase infusion.  Frequency of BP assessments may need to be increased if systolic BP stays >= 180 mm Hg or diastolic BP stays >= 105 mm Hg. Administer antihypertensive meds as ordered  Continue to monitor and control blood pressure and monitor for neurological deterioration every 15 minutes for the first hour after the infusion is stopped. Then every 30 minutes for the next 6 hours. Perform hourly monitoring from the 8th post-infusion hour until 24 hours post-infusion.  Temperature every 4 hours or as required.  Follow hospital protocol for further orders re: post tPA infusion patient management.  No antithrombotics or anticoagulants (including but not limited to: heparin, warfarin, aspirin, clopidigrel, or dipyridamole) for 24 hours, then start antithrombotics as ordered by treating physician    Adapted from the American Heart Association/American Stroke Association (AHA/ASA) and American Association of Neuroscience Nurses (AANN) Guidelines.   Possible Interventional Revascularization Candidate? No; at this time symptoms not suggestive of large vessel occlusion    Disposition Recommendation: do not transfer    Subjective:     History of Present Illness:  Pt presented to the ED with headache and hypertensive emergency. While in the ED patient was noted to have left arm and leg weakness and numbness. Thus stroke code was activated.       Woke up with symptoms?: no    Recent bleeding noted: no  Does the patient take any Blood Thinners? unknown  Medications: Unknown      Past Medical History: hypertension    Past Surgical History: no major surgeries within the last 2 weeks    Family History:  "hypertension    Social History: unable to obtain    Allergies: Vancomycin Analogues No relevant allergies    Review of Systems  Objective:   Vitals: Blood pressure (!) 120/55, pulse 82, temperature 98.6 °F (37 °C), temperature source Oral, resp. rate 18, height 5' 4" (1.626 m), weight 73.5 kg (162 lb 0.6 oz), SpO2 96 %, not currently breastfeeding. BP: chart reviewed    CT READ: Yes  No hemmorhage. No mass effect. No early infarct signs.     Physical Exam    Right arm and leg weakness   Right sided numbness     Recommended the emergency room physician to have a brief discussion with the patient and/or family if available regarding the  risks and benefits of treatment, and to briefly document the occurrence of that discussion in his clinical encounter note.     The attending portion of this evaluation, treatment, and documentation was performed per Gopal Lucas MD via audiovisual.    Billing code:  (time dependent stroke, complex case, unstable patient, hemorrhages, any intervention, some mimics)    This patient has a very critical neurological condition/illness, with very high morbidity and mortality.  There is a very high probability for acute neurological change leading to clinical and possibly life-threatening deterioration requiring highest level of physician preparedness for urgent intervention.  There is possibility that this condition will require treatment with high risk medications as quickly as possible.  There is also a possibility that the patient may benefit from further, more advance complex therapies (e.g. endovascular therapy) that will require prompt diagnosis and care.  Care was coordinated with other physicians involved in the patient's care.  Radiologic studies and laboratory data were reviewed and interpreted, and plan of care was re-assessed based on the results.  Diagnosis, treatment options and prognosis may have been discussed with the patient and/or family members or " caregiver.  Further advanced medical management and further evaluation is warranted for his care.    In your opinion, this was a: Tier 1 Van Negative    Consult End Time: 1:06 AM     Gopal Lucas MD  Comprehensive Stroke Center  Vascular Neurology   [facility]

## 2022-09-01 NOTE — PROGRESS NOTES
[facility]  Progress Note    Patient Name: Mayra Davis  MRN: [id number]  Patient Class: IP- Inpatient   Admission Date: 9/1/2022  Length of Stay: 0 days  Attending Physician: Livier Metz MD  Primary Care Provider: Teresita Darden MD        Subjective:     Principal Problem:S/P admn tPA in diff fac w/n last 24 hr bef adm to crnt fac        HPI:  46 years old female with past medical history of COVID-19 infection 2 and half months ago heart failure with ejection fraction 65% on echo 2022 diabetes not using insulin at home hypertension legally blind she was transferred from outside facility because she was having headaches for the past 3 days also though be partially relieved with Aleve she also noticed that she was having hypertension so she decided consult the ER when she arrived to the ER she states she was having episodes of chest pain with left arm pain and tingling as and he was noticed in the ER that she was having pronator drift and left lower extremity weakness as well is a so she received CT head CTA head and neck and tPA she was also hypertensive so she received hydralazine clonidine valsartan labetalol and ultimately was started on nicardipine drip.  She states that with medications her chest pain improved the chest pain had radiation to her back.  She was transferred to our facility due to in absence of ICU beds at outside facility.      Overview/Hospital Course:  9/1:  Patient doing well and currently has no neurological deficit.  MRI brain pending and continue to hold antiplatelet pending repeat CT after tPA.      Review of Systems   Constitutional:  Negative for activity change and fatigue.   Respiratory:  Negative for chest tightness and shortness of breath.    Cardiovascular:  Negative for chest pain and palpitations.   Gastrointestinal:  Negative for abdominal pain.   Musculoskeletal:  Negative for back pain and gait problem.   Neurological:  Negative for  dizziness, syncope, facial asymmetry and weakness.   All other systems reviewed and are negative.  Objective:     Vital Signs (Most Recent):  Temp: 98.1 °F (36.7 °C) (09/01/22 0409)  Pulse: 84 (09/01/22 1245)  Resp: 12 (09/01/22 1245)  BP: (!) 157/69 (09/01/22 1245)  SpO2: 100 % (09/01/22 1245)   Vital Signs (24h Range):  Temp:  [97.9 °F (36.6 °C)-98.6 °F (37 °C)] 98.1 °F (36.7 °C)  Pulse:  [76-92] 84  Resp:  [12-51] 12  SpO2:  [94 %-100 %] 100 %  BP: (107-225)/(50-98) 157/69     Weight: 73.5 kg (162 lb 0.6 oz)  Body mass index is 27.81 kg/m².  No intake or output data in the 24 hours ending 09/01/22 1440   Physical Exam  Vitals and nursing note reviewed.   HENT:      Head: Normocephalic.      Mouth/Throat:      Mouth: Mucous membranes are moist.   Eyes:      Pupils: Pupils are equal, round, and reactive to light.   Cardiovascular:      Rate and Rhythm: Normal rate.      Pulses: Normal pulses.   Pulmonary:      Effort: Pulmonary effort is normal.   Abdominal:      Palpations: Abdomen is soft.   Musculoskeletal:         General: Normal range of motion.      Cervical back: Normal range of motion.   Skin:     General: Skin is warm.   Neurological:      General: No focal deficit present.      Mental Status: She is alert and oriented to person, place, and time. Mental status is at baseline.       Significant Labs: All pertinent labs within the past 24 hours have been reviewed.  BMP:   Recent Labs   Lab 09/01/22  0430   *      K 3.3*      CO2 27   BUN 16   CREATININE 1.0   CALCIUM 8.2*   MG 1.6     CBC:   Recent Labs   Lab 08/31/22  1607 09/01/22  0430   WBC 6.60 6.84   HGB 10.3* 8.9*   HCT 30.2* 26.8*    256     CMP:   Recent Labs   Lab 08/31/22  1607 09/01/22  0430    137   K 3.8 3.3*    105   CO2 26 27   * 193*   BUN 12 16   CREATININE 0.9 1.0   CALCIUM 9.0 8.2*   PROT 6.0  --    ALBUMIN 2.7*  --    BILITOT 0.3  --    ALKPHOS 74  --    AST 17  --    ALT 17  --    ANIONGAP 8  5*     Cardiac Markers:   Recent Labs   Lab 08/31/22  1607   *     Troponin:   Recent Labs   Lab 08/31/22  1607   TROPONINI 0.007       Significant Imaging: I have reviewed all pertinent imaging results/findings within the past 24 hours.          Assessment/Plan:      * S/P admn tPA in diff fac w/n last 24 hr bef adm to crnt fac  Continue ICU monitoring by tPA protocol  Will continue to follow-up neurology recommendations case discussed with neurologist on the case Dr. Hampton input appreciated   PT OT speech therapy  MRI of the head and echo pending  SCDs for DVT prophylaxis  Drug screen UA alcohol level   Continue with nicardipine drip as needed for blood pressure ago after tPA   Can resume antihypertensive now 24 hour permissive hypertension is over    HTN (hypertension)  Resume home blood pressure medication      Anemia    Normocitic.  Fe/B12/FA levles  FOBT  [id number] may need colonosocpyh as outpatinet.      VTE Risk Mitigation (From admission, onward)         Ordered     Place sequential compression device  Until discontinued         09/01/22 3577                Discharge Planning   TAI:      Code Status: Prior   Is the patient medically ready for discharge?:     Reason for patient still in hospital (select all that apply): Patient trending condition  Discharge Plan A: Home with family                  Livier Metz MD  Department of Hospital Medicine   [facility]ly [facility]

## 2022-09-01 NOTE — PLAN OF CARE
[facility]  Initial Discharge Assessment       Primary Care Provider: Teresita Darden MD    Admission Diagnosis: Stroke [I63.9]    Admission Date: 9/1/2022  Expected Discharge Date: TBD     met with Pt at bedside to complete discharge assessment. Pt AAOx4s. Demographics, PCP, and insurance verified. No home health. No dialysis. Pt reports ability to complete ADLs without assistance. Pt verbalized plan to discharge home via family transport. Pt has no other needs to be addressed at this time.       Payor: [employer] MEDICARE / Plan: [employer] (SPECIAL NEEDS PLAN) / Product Type: Medicare Advantage /     Extended Emergency Contact Information  Primary Emergency Contact: TYRONE IBARRA  Mobile Phone: [phone]  Relation: Spouse  Preferred language: English   needed? No    Discharge Plan A: Home with family  Discharge Plan B: Home with family      [id number] [place] - [address]  [address] [address]  Phone: [phone] Fax: [phone]      Initial Assessment (most recent)       Adult Discharge Assessment - 09/01/22 1021          Discharge Assessment    Assessment Type Discharge Planning Assessment     Confirmed/corrected address, phone number and insurance Yes     Confirmed Demographics Correct on Facesheet     Source of Information patient     When was your last doctors appointment? --   Last month    Does patient/caregiver understand observation status Yes     Communicated TAI with patient/caregiver Yes     Lives With spouse     Do you expect to return to your current living situation? Yes     Do you have help at home or someone to help you manage your care at home? Yes     Who are your caregiver(s) and their phone number(s)? TYRONE IBARRA (Spouse)   [phone]     Prior to hospitilization cognitive status: Alert/Oriented     Current cognitive status: Alert/Oriented     Walking or Climbing Stairs Difficulty none     Dressing/Bathing  Difficulty none     Home Accessibility wheelchair accessible     Home Layout Able to live on 1st floor     Equipment Currently Used at Home none     Readmission within 30 days? Yes     Patient currently being followed by outpatient case management? No     Do you currently have service(s) that help you manage your care at home? No     Do you take prescription medications? Yes     Do you have prescription coverage? Yes     Coverage [employer] MANAGED MEDICARE - [employer] (SPECIAL NEEDS PLAN)     Do you have any problems affording any of your prescribed medications? No     Is the patient taking medications as prescribed? yes     Who is going to help you get home at discharge? TYRONE IBARRA (Spouse)   [phone]     How do you get to doctors appointments? family or friend will provide     Are you on dialysis? No     Do you take coumadin? No     Discharge Plan A Home with family     Discharge Plan B Home with family     DME Needed Upon Discharge  none     Discharge Plan discussed with: Patient        Relationship/Environment    Name(s) of Who Lives With Patient TYRONE IBARRA (Spouse)   [phone]

## 2022-09-01 NOTE — ASSESSMENT & PLAN NOTE
rec tPA   No LVO     Antithrombotics for secondary stroke prevention: Antiplatelets: None: Hold all Antithrombotics x 24 hours after IV t-PA administration    Statins for secondary stroke prevention and hyperlipidemia, if present:   Statins: Atorvastatin- 40 mg daily    Aggressive risk factor modification: HTN     Rehab efforts: The patient has been evaluated by a stroke team provider and the therapy needs have been fully considered based off the presenting complaints and exam findings. The following therapy evaluations are needed: PT evaluate and treat, OT evaluate and treat, SLP evaluate and treat, PM&R evaluate for appropriate placement    Diagnostics ordered/pending: CTA Head to assess vasculature , CTA Neck/Arch to assess vasculature, HgbA1C to assess blood glucose levels, Lipid Profile to assess cholesterol levels, MRI head without contrast to assess brain parenchyma, TTE to assess cardiac function/status , TSH to assess thyroid function    VTE prophylaxis: Mechanical prophylaxis: Place SCDs    BP parameters: Infarct: Post tPA, SBP <180

## 2022-09-01 NOTE — HPI
Pt presented to the ED with headache and hypertensive emergency. While in the ED patient was noted to have left arm and leg weakness and numbness. Thus stroke code was activated.

## 2022-09-01 NOTE — SUBJECTIVE & OBJECTIVE
No past medical history on file.    No past surgical history on file.    Review of patient's allergies indicates:   Allergen Reactions    Vancomycin analogues Rash       Current Facility-Administered Medications on File Prior to Encounter   Medication    [COMPLETED] 0.9%  NaCl infusion    [COMPLETED] acetaminophen tablet 1,000 mg    [COMPLETED] alteplase (ACtivase) injection 59.5 mg    [COMPLETED] ALTEPLASE IV BOLUS FROM VIAL 6.6 mg    [COMPLETED] furosemide injection 40 mg    [COMPLETED] hydrALAZINE injection 10 mg    [COMPLETED] iohexoL (OMNIPAQUE 350) 350 mg iodine/mL injection    [COMPLETED] ketorolac injection 9.999 mg    [COMPLETED] labetaloL injection 10 mg    [COMPLETED] ondansetron injection 8 mg    [DISCONTINUED] cloNIDine tablet 0.1 mg    [DISCONTINUED] hydrALAZINE injection 10 mg    [DISCONTINUED] labetaloL injection 10 mg    [DISCONTINUED] niCARdipine 40 mg/200 mL (0.2 mg/mL) infusion    [DISCONTINUED] valsartan tablet 160 mg     Current Outpatient Medications on File Prior to Encounter   Medication Sig    ALPRAZolam (XANAX) 1 MG tablet Take 1 mg by mouth 3 (three) times daily as needed.    atorvastatin (LIPITOR) 20 MG tablet Take 1 tablet (20 mg total) by mouth once daily.    busPIRone (BUSPAR) 7.5 MG tablet Take 7.5 mg by mouth 3 (three) times daily.    cloNIDine (CATAPRES) 0.1 MG tablet Take 0.1 mg by mouth 2 (two) times daily.    COMBIGAN 0.2-0.5 % Drop Place into both eyes.    furosemide (LASIX) 20 MG tablet Take 20 mg by mouth every morning.    furosemide (LASIX) 40 MG tablet Take 1 tablet (40 mg total) by mouth once daily.    glipiZIDE (GLUCOTROL) 10 MG tablet Take 10 mg by mouth 2 (two) times daily.    hydrALAZINE (APRESOLINE) 25 MG tablet Take 1 tablet (25 mg total) by mouth 3 (three) times daily.    JANUMET XR  mg TM24 Take 1 tablet by mouth once daily.    lamoTRIgine 50 mg TR24 Take 1 tablet by mouth once daily.    metFORMIN (GLUCOPHAGE) 500 MG tablet Take 2 tablets (1,000 mg total) by  mouth 2 (two) times daily with meals.    omeprazole (PRILOSEC) 20 MG capsule Take 20 mg by mouth once daily.    OXcarbazepine (TRILEPTAL) 150 MG Tab Take 150 mg by mouth 2 (two) times daily.    pantoprazole (PROTONIX) 40 MG tablet Take 40 mg by mouth once daily.    potassium chloride (MICRO-K) 10 MEQ CpSR Take 10 mEq by mouth once daily.    prednisoLONE acetate (PRED FORTE) 1 % DrpS Place 1 drop into both eyes 4 (four) times daily.    QUEtiapine (SEROQUEL) 25 MG Tab Take 25 mg by mouth nightly.    TRESIBA FLEXTOUCH U-100 100 unit/mL (3 mL) insulin pen Inject into the skin.    valsartan (DIOVAN) 160 MG tablet Take 1 tablet (160 mg total) by mouth 2 (two) times daily.    VITAMIN D3 25 mcg (1,000 unit) tablet Take 1,000 Units by mouth once daily.    zolpidem (AMBIEN) 10 mg Tab Take 10 mg by mouth nightly as needed.    [DISCONTINUED] losartan (COZAAR) 50 MG tablet Take 100 mg by mouth once daily.    [DISCONTINUED] pravastatin (PRAVACHOL) 20 MG tablet Take 20 mg by mouth once daily.     Family History    None       Tobacco Use    Smoking status: Not on file    Smokeless tobacco: Not on file   Substance and Sexual Activity    Alcohol use: Not on file    Drug use: Not on file    Sexual activity: Not on file     Review of Systems   Reason unable to perform ROS: Ten point system review pertinent positives and negatives are present HPI.   Objective:     Vital Signs (Most Recent):  Temp: 98.1 °F (36.7 °C) (09/01/22 0409)  Pulse: 82 (09/01/22 0230)  Resp: (!) 24 (09/01/22 0230)  BP: (!) 126/57 (09/01/22 0230)  SpO2: 97 % (09/01/22 0230)   Vital Signs (24h Range):  Temp:  [97.9 °F (36.6 °C)-98.6 °F (37 °C)] 98.1 °F (36.7 °C)  Pulse:  [76-92] 82  Resp:  [18-37] 24  SpO2:  [95 %-100 %] 97 %  BP: (107-225)/(55-98) 126/57     Weight: 73.5 kg (162 lb 0.6 oz)  Body mass index is 27.81 kg/m².    Physical Exam  Constitutional:       General: She is not in acute distress.     Appearance: Normal appearance. She is not ill-appearing,  toxic-appearing or diaphoretic.   HENT:      Head: Normocephalic and atraumatic.      Nose: Nose normal.   Cardiovascular:      Rate and Rhythm: Normal rate and regular rhythm.      Heart sounds: Normal heart sounds. No murmur heard.    No friction rub. No gallop.   Pulmonary:      Effort: Pulmonary effort is normal. No respiratory distress.      Breath sounds: Normal breath sounds. No stridor. No wheezing, rhonchi or rales.   Abdominal:      General: Abdomen is flat. Bowel sounds are normal. There is no distension.      Palpations: Abdomen is soft.      Tenderness: There is no abdominal tenderness. There is no guarding or rebound.      Hernia: No hernia is present.   Musculoskeletal:         General: No swelling, tenderness, deformity or signs of injury.      Cervical back: Neck supple.      Right lower leg: No edema.      Left lower leg: No edema.   Neurological:      Mental Status: She is alert.      Comments: No pronator drift at the time of my examination  Awake alert oriented follows commands moves 4 extremities symmetrically bilaterally  Strength 5/5 throughout           Significant Labs: All pertinent labs within the past 24 hours have been reviewed.  A1C:   Recent Labs   Lab 05/17/22  0328   HGBA1C 8.2*     CBC:   Recent Labs   Lab 08/31/22  1607   WBC 6.60   HGB 10.3*   HCT 30.2*        CMP:   Recent Labs   Lab 08/31/22  1607      K 3.8      CO2 26   *   BUN 12   CREATININE 0.9   CALCIUM 9.0   PROT 6.0   ALBUMIN 2.7*   BILITOT 0.3   ALKPHOS 74   AST 17   ALT 17   ANIONGAP 8     Lipid Panel:   Recent Labs   Lab 08/31/22 1912   CHOL 155   HDL 77*   LDLCALC 47.2*   TRIG 154*   CHOLHDL 49.7     Magnesium: No results for input(s): MG in the last 48 hours.  Troponin:   Recent Labs   Lab 08/31/22  1607   TROPONINI 0.007     TSH:   Recent Labs   Lab 08/31/22 1912   TSH 2.628     Urine Culture: No results for input(s): LABURIN in the last 48 hours.  Urine Studies: No results for  input(s): COLORU, APPEARANCEUA, PHUR, SPECGRAV, PROTEINUA, GLUCUA, KETONESU, BILIRUBINUA, OCCULTUA, NITRITE, UROBILINOGEN, LEUKOCYTESUR, RBCUA, WBCUA, BACTERIA, SQUAMEPITHEL, HYALINECASTS in the last 48 hours.    Invalid input(s): DIEGO    Significant Imaging: I have reviewed all pertinent imaging results/findings within the past 24 hours.

## 2022-09-01 NOTE — SUBJECTIVE & OBJECTIVE
"  Woke up with symptoms?: no    Recent bleeding noted: no  Does the patient take any Blood Thinners? unknown  Medications: Unknown      Past Medical History: hypertension    Past Surgical History: no major surgeries within the last 2 weeks    Family History: hypertension    Social History: unable to obtain    Allergies: Vancomycin Analogues No relevant allergies    Review of Systems  Objective:   Vitals: Blood pressure (!) 120/55, pulse 82, temperature 98.6 °F (37 °C), temperature source Oral, resp. rate 18, height 5' 4" (1.626 m), weight 73.5 kg (162 lb 0.6 oz), SpO2 96 %, not currently breastfeeding. BP: chart reviewed    CT READ: Yes  No hemmorhage. No mass effect. No early infarct signs.     Physical Exam    Right arm and leg weakness   Right sided numbness   "

## 2022-09-01 NOTE — HOSPITAL COURSE
9/1:  Patient doing well and currently has no neurological deficit.  MRI brain pending and continue to hold antiplatelet pending repeat CT after tPA.

## 2022-09-01 NOTE — PLAN OF CARE
Problem: Adult Inpatient Plan of Care  Goal: Plan of Care Review  9/1/2022 0305 by Thang Caballero RN  Outcome: Ongoing, Progressing  9/1/2022 0304 by Thang Caballero RN  Flowsheets (Taken 9/1/2022 0304)  Plan of Care Reviewed With: patient  Goal: Patient-Specific Goal (Individualized)  Outcome: Ongoing, Progressing  Goal: Absence of Hospital-Acquired Illness or Injury  Outcome: Ongoing, Progressing  Goal: Optimal Comfort and Wellbeing  Outcome: Ongoing, Progressing  Goal: Readiness for Transition of Care  Outcome: Ongoing, Progressing     Problem: Adjustment to Illness (Stroke, Ischemic/Transient Ischemic Attack)  Goal: Optimal Coping  Outcome: Ongoing, Progressing     Problem: Bowel Elimination Impaired (Stroke, Ischemic/Transient Ischemic Attack)  Goal: Effective Bowel Elimination  Outcome: Ongoing, Progressing     Problem: Cerebral Tissue Perfusion (Stroke, Ischemic/Transient Ischemic Attack)  Goal: Optimal Cerebral Tissue Perfusion  Outcome: Ongoing, Progressing     Problem: Cognitive Impairment (Stroke, Ischemic/Transient Ischemic Attack)  Goal: Optimal Cognitive Function  Outcome: Ongoing, Progressing     Problem: Communication Impairment (Stroke, Ischemic/Transient Ischemic Attack)  Goal: Improved Communication Skills  Outcome: Ongoing, Progressing     Problem: Functional Ability Impaired (Stroke, Ischemic/Transient Ischemic Attack)  Goal: Optimal Functional Ability  Outcome: Ongoing, Progressing     Problem: Respiratory Compromise (Stroke, Ischemic/Transient Ischemic Attack)  Goal: Effective Oxygenation and Ventilation  Outcome: Ongoing, Progressing     Problem: Sensorimotor Impairment (Stroke, Ischemic/Transient Ischemic Attack)  Goal: Improved Sensorimotor Function  Outcome: Ongoing, Progressing     Problem: Swallowing Impairment (Stroke, Ischemic/Transient Ischemic Attack)  Goal: Optimal Eating and Swallowing without Aspiration  Outcome: Ongoing, Progressing     Problem: Urinary Elimination  Impaired (Stroke, Ischemic/Transient Ischemic Attack)  Goal: Effective Urinary Elimination  Outcome: Ongoing, Progressing

## 2022-09-01 NOTE — ED NOTES
Report called to [facility] ICU, given to the RN of room 3027. [phone]

## 2022-09-01 NOTE — ASSESSMENT & PLAN NOTE
Continue ICU monitoring by tPA protocol  Will continue to follow-up neurology recommendations case discussed with neurologist on the case Dr. Hampton input appreciated   PT OT speech therapy  MRI of the head and echo pending  SCDs for DVT prophylaxis  Drug screen UA alcohol level   Continue with nicardipine drip as needed for blood pressure ago after tPA   Can resume antihypertensive now 24 hour permissive hypertension is over

## 2022-09-02 LAB
ANION GAP SERPL CALC-SCNC: 8 MMOL/L (ref 8–16)
AORTIC ROOT ANNULUS: 2.63 CM
AV INDEX (PROSTH): 0.69
AV MEAN GRADIENT: 6 MMHG
AV VALVE AREA: 2.01 CM2
BSA FOR ECHO PROCEDURE: 1.82 M2
BUN SERPL-MCNC: 18 MG/DL (ref 6–20)
CALCIUM SERPL-MCNC: 8.5 MG/DL (ref 8.7–10.5)
CHLORIDE SERPL-SCNC: 105 MMOL/L (ref 95–110)
CO2 SERPL-SCNC: 28 MMOL/L (ref 23–29)
CREAT SERPL-MCNC: 0.9 MG/DL (ref 0.5–1.4)
CV ECHO LV RWT: 0.49 CM
DOP CALC AO VTI: 32.5 CM
DOP CALC LVOT AREA: 2.9 CM2
DOP CALC LVOT DIAMETER: 1.93 CM
DOP CALC LVOT PEAK VEL: 112.93 M/S
DOP CALC LVOT STROKE VOLUME: 65.29 CM3
DOP CALCLVOT PEAK VEL VTI: 22.33 CM
E WAVE DECELERATION TIME: 165.76 MSEC
E/A RATIO: 0.99
E/E' RATIO: 10.38 M/S
ECHO LV POSTERIOR WALL: 1.14 CM (ref 0.6–1.1)
EJECTION FRACTION: 65 %
ERYTHROCYTE [DISTWIDTH] IN BLOOD BY AUTOMATED COUNT: 13.2 % (ref 11.5–14.5)
EST. GFR  (NO RACE VARIABLE): >60 ML/MIN/1.73 M^2
FRACTIONAL SHORTENING: 49 % (ref 28–44)
GLUCOSE SERPL-MCNC: 130 MG/DL (ref 70–110)
HCT VFR BLD AUTO: 28.1 % (ref 37–48.5)
HGB BLD-MCNC: 9.2 G/DL (ref 12–16)
INTERVENTRICULAR SEPTUM: 1.65 CM (ref 0.6–1.1)
LEFT ATRIUM SIZE: 3.73 CM
LEFT INTERNAL DIMENSION IN SYSTOLE: 2.35 CM (ref 2.1–4)
LEFT VENTRICLE DIASTOLIC VOLUME INDEX: 55.53 ML/M2
LEFT VENTRICLE DIASTOLIC VOLUME: 99.39 ML
LEFT VENTRICLE MASS INDEX: 144 G/M2
LEFT VENTRICLE SYSTOLIC VOLUME INDEX: 7.3 ML/M2
LEFT VENTRICLE SYSTOLIC VOLUME: 13.04 ML
LEFT VENTRICULAR INTERNAL DIMENSION IN DIASTOLE: 4.63 CM (ref 3.5–6)
LEFT VENTRICULAR MASS: 257.91 G
LV LATERAL E/E' RATIO: 9.22 M/S
LV SEPTAL E/E' RATIO: 11.86 M/S
MAGNESIUM SERPL-MCNC: 2.2 MG/DL (ref 1.6–2.6)
MCH RBC QN AUTO: 30.1 PG (ref 27–31)
MCHC RBC AUTO-ENTMCNC: 32.7 G/DL (ref 32–36)
MCV RBC AUTO: 92 FL (ref 82–98)
MV PEAK A VEL: 0.84 M/S
MV PEAK E VEL: 0.83 M/S
OB PNL STL: NEGATIVE
PLATELET # BLD AUTO: 246 K/UL (ref 150–450)
PMV BLD AUTO: 10.9 FL (ref 9.2–12.9)
POTASSIUM SERPL-SCNC: 3.8 MMOL/L (ref 3.5–5.1)
RBC # BLD AUTO: 3.06 M/UL (ref 4–5.4)
RIGHT VENTRICULAR END-DIASTOLIC DIMENSION: 2.89 CM
SODIUM SERPL-SCNC: 141 MMOL/L (ref 136–145)
TDI LATERAL: 0.09 M/S
TDI SEPTAL: 0.07 M/S
TDI: 0.08 M/S
TRICUSPID ANNULAR PLANE SYSTOLIC EXCURSION: 2.76 CM
WBC # BLD AUTO: 6.17 K/UL (ref 3.9–12.7)

## 2022-09-02 PROCEDURE — 94799 UNLISTED PULMONARY SVC/PX: CPT

## 2022-09-02 PROCEDURE — 25000003 PHARM REV CODE 250: Performed by: STUDENT IN AN ORGANIZED HEALTH CARE EDUCATION/TRAINING PROGRAM

## 2022-09-02 PROCEDURE — 83735 ASSAY OF MAGNESIUM: CPT | Performed by: STUDENT IN AN ORGANIZED HEALTH CARE EDUCATION/TRAINING PROGRAM

## 2022-09-02 PROCEDURE — 97165 OT EVAL LOW COMPLEX 30 MIN: CPT

## 2022-09-02 PROCEDURE — 94761 N-INVAS EAR/PLS OXIMETRY MLT: CPT

## 2022-09-02 PROCEDURE — 99900031 HC PATIENT EDUCATION (STAT)

## 2022-09-02 PROCEDURE — 99900035 HC TECH TIME PER 15 MIN (STAT)

## 2022-09-02 PROCEDURE — 80048 BASIC METABOLIC PNL TOTAL CA: CPT | Performed by: STUDENT IN AN ORGANIZED HEALTH CARE EDUCATION/TRAINING PROGRAM

## 2022-09-02 PROCEDURE — 97161 PT EVAL LOW COMPLEX 20 MIN: CPT

## 2022-09-02 PROCEDURE — 36415 COLL VENOUS BLD VENIPUNCTURE: CPT | Performed by: STUDENT IN AN ORGANIZED HEALTH CARE EDUCATION/TRAINING PROGRAM

## 2022-09-02 PROCEDURE — 97116 GAIT TRAINING THERAPY: CPT

## 2022-09-02 PROCEDURE — 97535 SELF CARE MNGMENT TRAINING: CPT

## 2022-09-02 PROCEDURE — 20000000 HC ICU ROOM

## 2022-09-02 PROCEDURE — 25000003 PHARM REV CODE 250

## 2022-09-02 PROCEDURE — 25000003 PHARM REV CODE 250: Performed by: INTERNAL MEDICINE

## 2022-09-02 PROCEDURE — 85027 COMPLETE CBC AUTOMATED: CPT | Performed by: STUDENT IN AN ORGANIZED HEALTH CARE EDUCATION/TRAINING PROGRAM

## 2022-09-02 PROCEDURE — 82272 OCCULT BLD FECES 1-3 TESTS: CPT | Performed by: STUDENT IN AN ORGANIZED HEALTH CARE EDUCATION/TRAINING PROGRAM

## 2022-09-02 RX ADMIN — VALSARTAN 160 MG: 80 TABLET, FILM COATED ORAL at 08:09

## 2022-09-02 RX ADMIN — ZOLPIDEM TARTRATE 10 MG: 5 TABLET, COATED ORAL at 09:09

## 2022-09-02 RX ADMIN — CLOPIDOGREL BISULFATE 75 MG: 75 TABLET, FILM COATED ORAL at 09:09

## 2022-09-02 RX ADMIN — CARVEDILOL 25 MG: 25 TABLET, FILM COATED ORAL at 08:09

## 2022-09-02 RX ADMIN — CARVEDILOL 25 MG: 25 TABLET, FILM COATED ORAL at 09:09

## 2022-09-02 RX ADMIN — CHLORHEXIDINE GLUCONATE 15 ML: 1.2 RINSE ORAL at 08:09

## 2022-09-02 RX ADMIN — CHLORHEXIDINE GLUCONATE 15 ML: 1.2 RINSE ORAL at 09:09

## 2022-09-02 RX ADMIN — ACETAMINOPHEN 650 MG: 325 TABLET ORAL at 03:09

## 2022-09-02 RX ADMIN — MUPIROCIN 1 G: 20 OINTMENT TOPICAL at 09:09

## 2022-09-02 RX ADMIN — VALSARTAN 160 MG: 80 TABLET, FILM COATED ORAL at 09:09

## 2022-09-02 RX ADMIN — ACETAMINOPHEN 650 MG: 325 TABLET ORAL at 09:09

## 2022-09-02 RX ADMIN — ASPIRIN 81 MG CHEWABLE TABLET 81 MG: 81 TABLET CHEWABLE at 09:09

## 2022-09-02 RX ADMIN — MUPIROCIN 1 G: 20 OINTMENT TOPICAL at 08:09

## 2022-09-02 RX ADMIN — ATORVASTATIN CALCIUM 40 MG: 40 TABLET, FILM COATED ORAL at 09:09

## 2022-09-02 NOTE — PT/OT/SLP EVAL
"Physical Therapy Evaluation    Patient Name:  Mayra Davis   MRN:  [id number]    Recommendations:     Discharge Recommendations:  outpatient PT (for L LE strength/coordination if pt chooses)   Discharge Equipment Recommendations: none   Barriers to discharge: None    Assessment:     Mayra Davis is a 46 y.o. female admitted with a medical diagnosis of S/P admn tPA in diff fac w/n last 24 hr bef adm to [facility].  She presents with the following impairments/functional limitations:  weakness, gait instability, decreased coordination, impaired coordination, impaired cardiopulmonary response to activity.    Pt found getting oob to step over to the toilet with bed alarm sounding. Once pt finished with toileting independently pt was agreeable to working with PT. Pt A & O x  4 and has the following co-morbidities: HTN, CHF, Legal Blindness.  Pt tolerated session fairly well and required close Supervision for safe mobilization during session today due to visual impairment and mild L LE coordination impairment. Pt would benefit from acute PT during hospitalization to increase strength, endurance and safety with mobility and would benefit from OP PT upon discharge home.      Rehab Prognosis: Good and Fair; patient would benefit from acute skilled PT services to address these deficits and reach maximum level of function.    Recent Surgery: * No surgery found *      Plan:     During this hospitalization, patient to be seen 2 x/week to address the identified rehab impairments via gait training, neuromuscular re-education, therapeutic activities, therapeutic exercises and progress toward the following goals:    Plan of Care Expires:  10/01/22    Subjective     Chief Complaint: pt reported "everything is better except my blood pressure and headaches.  Patient/Family Comments/goals: Home with  and OP PT if pt chooses to pursue.  Pain/Comfort:  Pain Rating 1: 0/10  Pain Rating Post-Intervention 1: 0/10    Patients " cultural, spiritual, [employer] conflicts given the current situation:      Living Environment:  Pt lives with her  in a single story house with no steps to enter.   Prior to admission, patients level of function was independent to supervision with mobility due to visual impairment.  Equipment used at home: none.  DME owned (not currently used): none.  Upon discharge, patient will have assistance from her .    Objective:     Communicated with NILE Simental prior to session.  Patient found  getting out of bed with bed alarm sounding  with bed alarm, peripheral IV, pulse ox (continuous), telemetry  upon PT entry to room.    General Precautions: Standard, fall, vision impaired   Orthopedic Precautions:N/A   Braces: N/A  Respiratory Status: Room air    Exams:  Cognitive Exam:  Patient is oriented to Person, Place, Time, and Situation  Fine Motor Coordination:    -       Impaired  LLE heel shin mildly  RLE ROM: WNL  RLE Strength: grossly 5/5  LLE ROM: WFL  LLE Strength: grossly 4/5    Functional Mobility:  Bed Mobility:     Supine to Sit: independence  Sit to Supine: independence  Transfers:     Sit to Stand:  modified independence with bed rail  Toilet Transfer: modified independence with  grab bars  using  Step Transfer  Gait: x 150' with close Supervision due to visual impairment and mild coordination deficit noted during L swing through phase of gait. Pt with no LOB or L knee buckling.     Therapeutic Activities and Exercises:   Pt was educated on the following: call light use, importance of OOB activity and functional mobility to negate the negative effects of prolonged bed rest during this hospitalization, safe transfers/ambulation and discharge planning recommendations/options.      AM-PAC 6 CLICK MOBILITY  Total Score:23     Patient left HOB elevated with all lines intact, call button in reach, bed alarm on, and RN notified.    GOALS:   Multidisciplinary Problems       Physical Therapy Goals           Problem: Physical Therapy    Goal Priority Disciplines Outcome Goal Variances Interventions   Physical Therapy Goal     PT, PT/OT      Description: Goals to be met by: 10/1/22     Patient will increase functional independence with mobility by performin. Sit to stand transfer with [place]  2. Gait  x 250 feet with Supervision using No Assistive Device.                          History:     No past medical history on file.    No past surgical history on file.    Time Tracking:     PT Received On: 22  PT Start Time: 917     PT Stop Time: 938  PT Total Time (min): 21 min     Billable Minutes: Evaluation 10 and Gait Training 11      2022

## 2022-09-02 NOTE — PLAN OF CARE
09/02/22 0800   Patient Assessment/Suction   Level of Consciousness (AVPU) alert   Respiratory Effort Unlabored   Expansion/Accessory Muscles/Retractions no use of accessory muscles   All Lung Fields Breath Sounds clear   Rhythm/Pattern, Respiratory unlabored   Cough Frequency infrequent   PRE-TX-O2   O2 Device (Oxygen Therapy) room air   SpO2 100 %   Pulse Oximetry Type Continuous   $ Pulse Oximetry - Multiple Charge Pulse Oximetry - Multiple   Oximetry Probe Site Assessed;Intact   Pulse 79   Resp (!) 21   BP (!) 180/81   Education   $ Education 15 min   Respiratory Evaluation   $ Care Plan Tech Time 15 min   $ Eval/Re-eval Charges Evaluation

## 2022-09-02 NOTE — RESPIRATORY THERAPY
09/02/22 0007   PRE-TX-O2   O2 Device (Oxygen Therapy) room air   SpO2 97 %   Pulse Oximetry Type Continuous   $ Pulse Oximetry - Multiple Charge Pulse Oximetry - Multiple   Pulse 74   Resp 16   Education   $ Education 15 min   Respiratory Evaluation   $ Care Plan Tech Time 15 min

## 2022-09-02 NOTE — PROGRESS NOTES
"[facility]  Adult Nutrition   Progress Note (Initial Assessment)     SUMMARY     Recommendations  Recommendation/Intervention: 1. RD added diabetic diet restrictions. Continue cardiac/ diabetic diet. 2. Patient reports nausea, recommend antinausea/antiemetics PRN. 3. RD obtained patient's food preferences, notified kitchen, and added preferences to [facility].  Goals: 1. Patient to meet at least 75% of estimated energy and protein needs. 2. Nausea to be managed.  Nutrition Goal Status: new  Communication of RD Recs: reviewed with RN    Dietitian Rounds Brief   Patient assessed 2' ICU status. S/p admn tPA in diff fac w/n last 24 hr bef adm to crnt fac. Per SLP note no mrhobg-eojdzwfp-zyqaksxjc deficits. Started on Cardiac diet. PO intake good, but c/o some nausea. Patient has DM, added diabetic diet restrictions. RD obtained food prefs. Patient follows Diabetic diet and is eager to follow cardiac diet as well. Improved Ha1c through diet per patient. Follows diet from Author Dr. Ashwin Rascon (low fat, starch based diet, high in fiber and contains no cholesterol).     Reason for Assessment  Reason For Assessment: identified at risk by screening criteria  Diagnosis: cardiac disease  Relevant Medical History: HTN; acute diastolic CHF    Nutrition Risk Screen  Nutrition Risk Screen: no indicators present     MST Score: 0  Have you recently lost weight without trying?: No  Weight loss score: 0  Have you been eating poorly because of a decreased appetite?: No  Appetite score: 0       Nutrition/Diet History  Patient Reported Diet/Restrictions/Preferences: diabetic diet, heart healthy (Dr. Rascon Diet)  Food Allergies: NKFA  Factors Affecting Nutritional Intake: None identified at this time, other (see comments) (some nausea reported but not affecting intake)    Anthropometrics  Temp: 98.2 °F (36.8 °C)  Height: 5' 4" (162.6 cm)  Height (inches): 64 in  Weight Method: Bed Scale  Weight: 73.5 kg (162 " lb 0.6 oz)  Weight (lb): 162.04 lb  Ideal Body Weight (IBW), Female: 120 lb  % Ideal Body Weight, Female (lb): 135.03 %  BMI (Calculated): 27.8  BMI Grade: 25 - 29.9 - overweight       Weight History:  Wt Readings from Last 10 Encounters:   09/01/22 73.5 kg (162 lb 0.6 oz)   09/01/22 73.5 kg (162 lb)   08/31/22 73.5 kg (162 lb 0.6 oz)   09/01/22 73.5 kg (162 lb)   08/14/22 73.5 kg (162 lb)   05/17/22 73.5 kg (162 lb)       Lab/Procedures/Meds: Pertinent Labs Reviewed    Clinical Chemistry:  Recent Labs   Lab 08/31/22  1607 08/31/22  1607 09/01/22  0430 09/02/22  0334     --  137 141   K 3.8  --  3.3* 3.8     --  105 105   CO2 26  --  27 28   *  --  193* 130*   BUN 12  --  16 18   CREATININE 0.9  --  1.0 0.9   CALCIUM 9.0  --  8.2* 8.5*   PROT 6.0  --   --   --    ALBUMIN 2.7*  --   --   --    BILITOT 0.3  --   --   --    ALKPHOS 74  --   --   --    AST 17  --   --   --    ALT 17  --   --   --    ANIONGAP 8  --  5* 8   MG  --    < > 1.6 2.2   PHOS  --   --  4.5  --     < > = values in this interval not displayed.       CBC:   Recent Labs   Lab 09/02/22  0334   WBC 6.17   RBC 3.06*   HGB 9.2*   HCT 28.1*      MCV 92   MCH 30.1   MCHC 32.7       Lipid Panel:  Recent Labs   Lab 08/31/22  1912   CHOL 155   HDL 77*   LDLCALC 47.2*   TRIG 154*   CHOLHDL 49.7       Cardiac Profile:  Recent Labs   Lab 08/31/22  1607   *   TROPONINI 0.007           Diabetes:  Recent Labs   Lab 08/31/22  1900 09/01/22  0430   HGBA1C  --  7.1*   POCTGLUCOSE 140*  --        Thyroid & Parathyroid:  Recent Labs   Lab 08/31/22  1912   TSH 2.628       Vitamins:  Recent Labs   Lab 09/01/22  0430   [id number]         Medications: Pertinent Medications reviewed    Scheduled Meds:   aspirin  81 mg Oral Daily    atorvastatin  40 mg Oral QHS    carvediloL  25 mg Oral BID    chlorhexidine  15 mL Mouth/Throat BID    clopidogreL  75 mg Oral Daily    mupirocin   Nasal BID    valsartan  160 mg Oral BID       Continuous  Infusions:   niCARdipine Stopped (09/02/22 1200)       PRN Meds:.acetaminophen, calcium gluconate IVPB, calcium gluconate IVPB, calcium gluconate IVPB, magnesium oxide, magnesium oxide, magnesium sulfate IVPB, magnesium sulfate IVPB, potassium bicarbonate, potassium bicarbonate, potassium bicarbonate, potassium chloride **AND** potassium chloride **AND** potassium chloride, potassium, sodium phosphates, potassium, sodium phosphates, potassium, sodium phosphates, sodium phosphate IVPB, sodium phosphate IVPB, sodium phosphate IVPB, zolpidem    Estimated/Assessed Needs  Weight Used For Calorie Calculations: 73.5 kg (162 lb 0.6 oz)  Energy Calorie Requirements (kcal): 1838 - 2205 (25 - 30 kcal/kg)  Energy Need Method: Kcal/kg  Protein Requirements: 88 - 110 (1.2 - 1.5 g/kg)  Weight Used For Protein Calculations: 73.5 kg (162 lb 0.6 oz)  Fluid Requirements (mL): 1838 (25 ml/kg or per MD)  Estimated Fluid Requirement Method: other (see comments)  RDA Method (mL): 1838       Nutrition Prescription Ordered  Current Diet Order: Cardiac    Evaluation of Received Nutrient/Fluid Intake  Energy Calories Required: meeting needs  Protein Required: meeting needs  Fluid Required: meeting needs  Tolerance: tolerating     Intake/Output Summary (Last 24 hours) at 9/2/2022 1221  Last data filed at 9/2/2022 0545  Gross per 24 hour   Intake 400 ml   Output 700 ml   Net -300 ml      % Intake of Estimated Energy Needs: 75 - 100 %  % Meal Intake: 75 - 100 %    Nutrition Risk  Level of Risk/Frequency of Follow-up: moderate - high     Monitor and Evaluation  Food and Nutrient Intake: energy intake, food and beverage intake  Food and Nutrient Adminstration: diet order  Knowledge/Beliefs/Attitudes: beliefs and attitudes, food and nutrition knowledge/skill  Physical Activity and Function: nutrition-related ADLs and IADLs, factors affecting access to physical activity  Anthropometric Measurements: weight, weight change, body mass  index  Biochemical Data, Medical Tests and Procedures: electrolyte and renal panel, inflammatory profile, gastrointestinal profile, lipid profile, glucose/endocrine profile  Nutrition-Focused Physical Findings: overall appearance     Nutrition Follow-Up  RD Follow-up?: Yes    Lorna Barreto RD 09/02/2022 12:22 PM

## 2022-09-02 NOTE — PT/OT/SLP EVAL
Occupational Therapy   Evaluation and Discharge Note    Name: Mayra Davis  MRN: [id number]  Admitting Diagnosis:  S/P admn tPA in diff fac w/n last 24 hr bef adm to crnt fac   Recent Surgery: * No surgery found *      Recommendations:     Discharge Recommendations: home  Discharge Equipment Recommendations:  none  Barriers to discharge:  None    Assessment:     Mayra Davis is a 46 y.o. female with a medical diagnosis of S/P admn tPA in diff fac w/n last 24 hr bef adm to crnt fac. At this time, patient is functioning at their prior level of function and does not require further acute OT services.   Pt's BP at start of session 205/86, RN notified and approved in room ADLs. Pt's BP at end of session 194/85. RN notified.     Plan:     During this hospitalization, patient does not require further acute OT services.  Please re-consult if situation changes.    Plan of Care Reviewed with: patient    Subjective     Chief Complaint: none stated  Patient/Family Comments/goals: to return home    Occupational Profile:  Living Environment: Pt lives with  in a 1 story home with threshold step to enter. 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  Patient found HOB elevated with peripheral IV, pulse ox (continuous), telemetry, blood pressure cuff upon OT entry to room.    General Precautions: Standard, fall, vision impaired   Orthopedic Precautions:N/A   Braces: N/A  Respiratory Status: Room air     Occupational Performance:    Bed Mobility:    Patient completed Supine to Sit with modified independence  Patient  completed Sit to Supine with modified independence    Functional Mobility/Transfers:  Patient completed Sit <> Stand Transfer with independence  with  no assistive device   Patient completed Toilet Transfer Step Transfer technique with supervision with  no AD    Activities of Daily Living:  Feeding:  independence per pt  Grooming: supervision standing at sink simulated oral care  Lower Body Dressing: independence seated EOB to don/doff socks  Toileting: independence      Cognitive/Visual Perceptual:  Cognitive/Psychosocial Skills:     -       Oriented to: Person, Place, Time, and Situation   -       Follows Commands/attention:Follows two-step commands  -       Communication: clear/fluent  -       Memory: No Deficits noted  -       Safety awareness/insight to disability: intact   -       Mood/Affect/Coping skills/emotional control: Appropriate to situation, Cooperative, and Pleasant    Physical Exam:  Balance:    -       Good seated/standing balance  Upper Extremity Range of Motion:     -       Right Upper Extremity: WFL  -       Left Upper Extremity: WFL  Upper Extremity Strength:    -       Right Upper Extremity: WFL  -       Left Upper Extremity: WFL   Strength:    -       Right Upper Extremity: WFL  -       Left Upper Extremity: WFL  Fine Motor Coordination:    -       Intact  Gross motor coordination:   WFL    AMPAC 6 Click ADL:  AMPAC Total Score: 24    Treatment & Education:  Pt educated on role of OT/POC, importance of OOB/EOB activity, use of call bell, and safety during ADLs, transfers, and functional mobility.    Patient left HOB elevated with all lines intact, call button in reach, and RN notified    GOALS:   Multidisciplinary Problems       Occupational Therapy Goals       Not on file                    History:     No past medical history on file.    No past surgical history on file.    Time Tracking:     OT Date of Treatment: 09/02/22  OT Start Time: 0844  OT Stop Time: 0857  OT Total Time (min):  13 min    Billable Minutes:Evaluation 5  Self Care/Home Management 8    9/2/2022

## 2022-09-02 NOTE — PLAN OF CARE
MICU DAILY GOALS       A: Awake    RASS: Goal - RASS Goal: 0-->alert and calm  Actual - RASS (Rae Agitation-Sedation Scale): 0-->alert and calm   Restraint necessity:  n/a  B: Breathe   SBT: Not intubated   C: Coordinate A & B, analgesics/sedatives   Pain: managed    SAT: Not intubated  D: Delirium   CAM-ICU:    E: Early(intubated/ Progressive (non-intubated) Mobility   MOVE Screen: Pass   Activity: Activity Management: Ambulated to bathroom - L4, Ankle pumps - L1, Arm raise - L1, Heel slide - L1, Rolling - L1, Sitting at edge of bed - L2  FAS: Feeding/Nutrition   Diet order: Diet/Nutrition Received: low saturated fat/low cholesterol, consistent carb/diabetic diet,    T: Thrombus   DVT prophylaxis: VTE Required Core Measure: Pharmacological prophylaxis initiated/maintained (Plavix)  H: HOB Elevation   Head of Bed (HOB) Positioning: HOB at 30-45 degrees  U: Ulcer Prophylaxis   GI: None ordered at this time.  G: Glucose control   managed    S: Skin   Bathing/Skin Care: electrode patches/site rotation     Pressure Reduction Devices: pressure-redistributing mattress utilized  Pressure Reduction Techniques: frequent weight shift encouraged  Skin Protection: adhesive use limited, incontinence pads utilized, tubing/devices free from skin contact  B: Bowel Function   no issues   I: Indwelling Catheters   [place] necessity:     CVC necessity: No  D: De-escalation Antibiotics   Not currently on antibiotics regimen.    Family/Goals of care/Code Status   Code Status: Prior    Shift Events   No acute events throughout shift.  Cardene infusion restarted.    VS and assessment per flow sheet, patient progressing towards goals as tolerated, plan of care reviewed with  Ms. Nunez , all concerns addressed, will continue to monitor.    Kamille Velazquez       Problem: Adult Inpatient Plan of Care  Goal: Plan of Care Review  Outcome: Ongoing, Progressing  Goal: Patient-Specific Goal (Individualized)  Outcome: Ongoing, Progressing  Goal:  Absence of Hospital-Acquired Illness or Injury  Outcome: Ongoing, Progressing  Goal: Optimal Comfort and Wellbeing  Outcome: Ongoing, Progressing  Goal: Readiness for Transition of Care  Outcome: Ongoing, Progressing     Problem: Adjustment to Illness (Stroke, Ischemic/Transient Ischemic Attack)  Goal: Optimal Coping  Outcome: Ongoing, Progressing     Problem: Cerebral Tissue Perfusion (Stroke, Ischemic/Transient Ischemic Attack)  Goal: Optimal Cerebral Tissue Perfusion  Outcome: Ongoing, Progressing     Problem: Sensorimotor Impairment (Stroke, Ischemic/Transient Ischemic Attack)  Goal: Improved Sensorimotor Function  Outcome: Ongoing, Progressing     Problem: Fall Injury Risk  Goal: Absence of Fall and Fall-Related Injury  Outcome: Ongoing, Progressing     Problem: Bowel Elimination Impaired (Stroke, Ischemic/Transient Ischemic Attack)  Goal: Effective Bowel Elimination  Outcome: Met

## 2022-09-02 NOTE — PLAN OF CARE
Goals to be met by: 10/1/22     Patient will increase functional independence with mobility by performin. Sit to stand transfer with [place]  2. Gait  x 250 feet with Supervision using No Assistive Device.

## 2022-09-03 LAB
ANION GAP SERPL CALC-SCNC: 6 MMOL/L (ref 8–16)
BUN SERPL-MCNC: 15 MG/DL (ref 6–20)
CALCIUM SERPL-MCNC: 8.3 MG/DL (ref 8.7–10.5)
CHLORIDE SERPL-SCNC: 105 MMOL/L (ref 95–110)
CO2 SERPL-SCNC: 25 MMOL/L (ref 23–29)
CREAT SERPL-MCNC: 1 MG/DL (ref 0.5–1.4)
ERYTHROCYTE [DISTWIDTH] IN BLOOD BY AUTOMATED COUNT: 12.8 % (ref 11.5–14.5)
EST. GFR  (NO RACE VARIABLE): >60 ML/MIN/1.73 M^2
GLUCOSE SERPL-MCNC: 106 MG/DL (ref 70–110)
GLUCOSE SERPL-MCNC: 181 MG/DL (ref 70–110)
HCT VFR BLD AUTO: 28.8 % (ref 37–48.5)
HGB BLD-MCNC: 9.4 G/DL (ref 12–16)
MCH RBC QN AUTO: 29.7 PG (ref 27–31)
MCHC RBC AUTO-ENTMCNC: 32.6 G/DL (ref 32–36)
MCV RBC AUTO: 91 FL (ref 82–98)
PLATELET # BLD AUTO: 270 K/UL (ref 150–450)
PMV BLD AUTO: 10.4 FL (ref 9.2–12.9)
POTASSIUM SERPL-SCNC: 3.9 MMOL/L (ref 3.5–5.1)
RBC # BLD AUTO: 3.17 M/UL (ref 4–5.4)
SODIUM SERPL-SCNC: 136 MMOL/L (ref 136–145)
WBC # BLD AUTO: 6.69 K/UL (ref 3.9–12.7)

## 2022-09-03 PROCEDURE — 25000003 PHARM REV CODE 250: Performed by: STUDENT IN AN ORGANIZED HEALTH CARE EDUCATION/TRAINING PROGRAM

## 2022-09-03 PROCEDURE — 21400001 HC TELEMETRY ROOM

## 2022-09-03 PROCEDURE — 85027 COMPLETE CBC AUTOMATED: CPT | Performed by: STUDENT IN AN ORGANIZED HEALTH CARE EDUCATION/TRAINING PROGRAM

## 2022-09-03 PROCEDURE — 80048 BASIC METABOLIC PNL TOTAL CA: CPT | Performed by: STUDENT IN AN ORGANIZED HEALTH CARE EDUCATION/TRAINING PROGRAM

## 2022-09-03 PROCEDURE — 25000003 PHARM REV CODE 250: Performed by: INTERNAL MEDICINE

## 2022-09-03 PROCEDURE — 25000003 PHARM REV CODE 250

## 2022-09-03 PROCEDURE — 63600175 PHARM REV CODE 636 W HCPCS: Performed by: STUDENT IN AN ORGANIZED HEALTH CARE EDUCATION/TRAINING PROGRAM

## 2022-09-03 PROCEDURE — 36415 COLL VENOUS BLD VENIPUNCTURE: CPT | Performed by: STUDENT IN AN ORGANIZED HEALTH CARE EDUCATION/TRAINING PROGRAM

## 2022-09-03 RX ORDER — HYDRALAZINE HYDROCHLORIDE 25 MG/1
50 TABLET, FILM COATED ORAL EVERY 8 HOURS
Status: DISCONTINUED | OUTPATIENT
Start: 2022-09-03 | End: 2022-09-04

## 2022-09-03 RX ORDER — DIPHENHYDRAMINE HCL 25 MG
25 CAPSULE ORAL EVERY 6 HOURS PRN
Status: DISCONTINUED | OUTPATIENT
Start: 2022-09-03 | End: 2022-09-04 | Stop reason: HOSPADM

## 2022-09-03 RX ORDER — HYDRALAZINE HYDROCHLORIDE 20 MG/ML
10 INJECTION INTRAMUSCULAR; INTRAVENOUS EVERY 6 HOURS PRN
Status: DISCONTINUED | OUTPATIENT
Start: 2022-09-03 | End: 2022-09-04 | Stop reason: HOSPADM

## 2022-09-03 RX ORDER — ONDANSETRON 2 MG/ML
4 INJECTION INTRAMUSCULAR; INTRAVENOUS EVERY 6 HOURS PRN
Status: DISCONTINUED | OUTPATIENT
Start: 2022-09-03 | End: 2022-09-04 | Stop reason: HOSPADM

## 2022-09-03 RX ORDER — IBUPROFEN 400 MG/1
400 TABLET ORAL EVERY 6 HOURS PRN
Status: DISCONTINUED | OUTPATIENT
Start: 2022-09-03 | End: 2022-09-04 | Stop reason: HOSPADM

## 2022-09-03 RX ADMIN — VALSARTAN 160 MG: 80 TABLET, FILM COATED ORAL at 09:09

## 2022-09-03 RX ADMIN — HYDRALAZINE HYDROCHLORIDE 50 MG: 25 TABLET, FILM COATED ORAL at 11:09

## 2022-09-03 RX ADMIN — ONDANSETRON HYDROCHLORIDE 4 MG: 2 SOLUTION INTRAMUSCULAR; INTRAVENOUS at 03:09

## 2022-09-03 RX ADMIN — HYDRALAZINE HYDROCHLORIDE 10 MG: 20 INJECTION INTRAMUSCULAR; INTRAVENOUS at 02:09

## 2022-09-03 RX ADMIN — CARVEDILOL 25 MG: 25 TABLET, FILM COATED ORAL at 09:09

## 2022-09-03 RX ADMIN — ASPIRIN 81 MG CHEWABLE TABLET 81 MG: 81 TABLET CHEWABLE at 09:09

## 2022-09-03 RX ADMIN — IBUPROFEN 400 MG: 400 TABLET ORAL at 11:09

## 2022-09-03 RX ADMIN — ZOLPIDEM TARTRATE 10 MG: 5 TABLET, COATED ORAL at 11:09

## 2022-09-03 RX ADMIN — HYDRALAZINE HYDROCHLORIDE 50 MG: 25 TABLET, FILM COATED ORAL at 03:09

## 2022-09-03 RX ADMIN — VALSARTAN 160 MG: 80 TABLET, FILM COATED ORAL at 08:09

## 2022-09-03 RX ADMIN — IBUPROFEN 400 MG: 400 TABLET ORAL at 03:09

## 2022-09-03 RX ADMIN — CLOPIDOGREL BISULFATE 75 MG: 75 TABLET, FILM COATED ORAL at 09:09

## 2022-09-03 RX ADMIN — CARVEDILOL 25 MG: 25 TABLET, FILM COATED ORAL at 08:09

## 2022-09-03 RX ADMIN — CHLORHEXIDINE GLUCONATE 15 ML: 1.2 RINSE ORAL at 09:09

## 2022-09-03 RX ADMIN — CHLORHEXIDINE GLUCONATE 15 ML: 1.2 RINSE ORAL at 08:09

## 2022-09-03 RX ADMIN — ACETAMINOPHEN 650 MG: 325 TABLET ORAL at 09:09

## 2022-09-03 RX ADMIN — MUPIROCIN 1 G: 20 OINTMENT TOPICAL at 09:09

## 2022-09-03 RX ADMIN — MUPIROCIN 1 G: 20 OINTMENT TOPICAL at 08:09

## 2022-09-03 RX ADMIN — ATORVASTATIN CALCIUM 40 MG: 40 TABLET, FILM COATED ORAL at 08:09

## 2022-09-03 RX ADMIN — DIPHENHYDRAMINE HYDROCHLORIDE 25 MG: 25 CAPSULE ORAL at 07:09

## 2022-09-03 NOTE — PROGRESS NOTES
[facility] Medicine  Progress Note    Patient Name: Mayra Davis  MRN: [id number]  Patient Class: IP- Inpatient   Admission Date: 9/1/2022  Length of Stay: 1 days  Attending Physician: Kris Reyez, *  Primary Care Provider: Teresita Darden MD        Subjective:     Principal Problem:S/P admn tPA in diff fac w/n last 24 hr bef adm to crnt fac        HPI:  46 years old female with past medical history of COVID-19 infection 2 and half months ago heart failure with ejection fraction 65% on echo 2022 diabetes not using insulin at home hypertension legally blind she was transferred from outside facility because she was having headaches for the past 3 days also though be partially relieved with Aleve she also noticed that she was having hypertension so she decided consult the ER when she arrived to the ER she states she was having episodes of chest pain with left arm pain and tingling as and he was noticed in the ER that she was having pronator drift and left lower extremity weakness as well is a so she received CT head CTA head and neck and tPA she was also hypertensive so she received hydralazine clonidine valsartan labetalol and ultimately was started on nicardipine drip.  She states that with medications her chest pain improved the chest pain had radiation to her back.  She was transferred to our facility due to in absence of ICU beds at outside facility.      Interval History  9/1:  Patient doing well and currently has no neurological deficit.  MRI brain pending and continue to hold antiplatelet pending repeat CT after tPA.    9/2- alert, comfortable.  No focal neuro deficit.  tPA yesterday.  HTN emergency prior to stroke like symptoms.  Still on cardene.  Attempt to wean cardizem and step down from ICU        Assessment/Plan:      46 years old female with past medical history of diastolic heart failure with ejection fraction 65% on echo 2022 diabetes not using insulin at home,  hypertension, legally blind she was transferred from outside facility because she was having headaches for the past 3 days and was having episodes of chest pain, left arm pain and tingling noted  in ER that she had pronator drift and left lower extremity weakness. CT head CTA head and neck were negative, she received tPA.  She had hyn emergency, given po meds, IV bp meds and eventually started on nicardipine drip.  She was transferred to our facility for ICU care      Hypertensive emerency  TIA, r/o CVA  S/p tPA  Sbp >200, + for HA and chest pain.  She notes previous episodes of severe HTN and headache.  She received tPA at OSH, unclear if patient had symptoms r/t HTN emergency, TIA vs ischemic stroke  -Head CT and Head/neck CTA unrevealing  -neurology consulted, appreciate recs  -ICU care and q1 hr neuro checks s/p tPA  -MRI negative for stroke  -secondary prevention DAPt and statin  -PT/OT/ST  -a1c, lipid profile, tsh  -cardene infusion for bp control, continue to bring bp down slowly  -restart PO bp meds tonight    VTE Risk Mitigation (From admission, onward)           Ordered     Place sequential compression device  Until discontinued         09/01/22 0416                    Discharge Planning   TAI: 9/3/2022     Code Status: Prior   Is the patient medically ready for discharge?:     Reason for patient still in hospital (select all that apply): Patient trending condition  Discharge Plan A: Home with family                  Kris Reyez MD  Department of Hospital Medicine   [facility]

## 2022-09-03 NOTE — PLAN OF CARE
Patient responding to treatment/ plan of care discussed with patient/ plan ongoing and progressing

## 2022-09-03 NOTE — PLAN OF CARE
Plan of Care and Education discussed with patient. Pt has verbalized understanding. Discussed with pt the importance of monitoring blood sugar levels at home, keeping track of blood pressure levels, and remaining compliant with prescribed medications. Discussed BEFAST monitoring and the importance of seeing immediate medical attention if changes are noted. Due to pt's visual acuity changes, we discussed the importance of adhering to vital sign monitoring at home and keeping compliant with scheduled provider visits. Bed locked in low position, bed alarm activated, and call light/belongings within reach.   Problem: Adult Inpatient Plan of Care  Goal: Plan of Care Review  9/3/2022 1802 by Kacey Chavez RN  Outcome: Ongoing, Progressing  9/3/2022 1050 by Kacey Chavez RN  Outcome: Ongoing, Progressing  9/3/2022 1050 by Kacey Chavez RN  Outcome: Ongoing, Not Progressing  9/3/2022 1042 by Kacey Chavez RN  Outcome: Ongoing, Progressing  9/3/2022 1033 by Kacey Chavez RN  Outcome: Ongoing, Progressing  Goal: Patient-Specific Goal (Individualized)  9/3/2022 1802 by Kacey Chavez RN  Outcome: Ongoing, Progressing  9/3/2022 1050 by Kacey Chavez RN  Outcome: Ongoing, Progressing  9/3/2022 1050 by Kacey Chavez RN  Outcome: Ongoing, Not Progressing  9/3/2022 1042 by Kacey Chavez RN  Outcome: Ongoing, Progressing  9/3/2022 1033 by Kacey Chavez RN  Outcome: Ongoing, Progressing  Goal: Absence of Hospital-Acquired Illness or Injury  9/3/2022 1802 by Kacey Chavez RN  Outcome: Ongoing, Progressing  9/3/2022 1050 by Kacey Chavez RN  Outcome: Ongoing, Progressing  9/3/2022 1050 by Kacey Chavez RN  Outcome: Ongoing, Not Progressing  9/3/2022 1042 by Kacey Chavez RN  Outcome: Ongoing, Progressing  9/3/2022 1033 by Kacey Chavez RN  Outcome: Ongoing, Progressing  Goal: Optimal Comfort and Wellbeing  9/3/2022 1802 by Kacey Chavez RN  Outcome: Ongoing, Progressing  9/3/2022 1050 by Kacey Chavez RN  Outcome:  Ongoing, Progressing  9/3/2022 1050 by Kacey Chavez RN  Outcome: Ongoing, Not Progressing  9/3/2022 1042 by Kacey Chavez RN  Outcome: Ongoing, Progressing  9/3/2022 1033 by Kacey Chavez RN  Outcome: Ongoing, Progressing  Goal: Readiness for Transition of Care  9/3/2022 1802 by Kacey Chavez RN  Outcome: Ongoing, Progressing  9/3/2022 1050 by Kacey Chavez RN  Outcome: Ongoing, Progressing  9/3/2022 1050 by Kacey Chavez RN  Outcome: Ongoing, Not Progressing  9/3/2022 1042 by Kacey Chavez RN  Outcome: Ongoing, Progressing  9/3/2022 1033 by Kacey Chavez RN  Outcome: Ongoing, Progressing     Problem: Fall Injury Risk  Goal: Absence of Fall and Fall-Related Injury  9/3/2022 1802 by Kacey Chavez RN  Outcome: Ongoing, Progressing  9/3/2022 1050 by Kacey Chavez RN  Outcome: Ongoing, Progressing  9/3/2022 1050 by Kacey Chavez RN  Outcome: Ongoing, Not Progressing  9/3/2022 1042 by Kacey Chavez RN  Outcome: Ongoing, Progressing  9/3/2022 1033 by Kacey Chavez RN  Outcome: Ongoing, Progressing     Problem: Diabetes Comorbidity  Goal: Blood Glucose Level Within Targeted Range  9/3/2022 1802 by Kacey Chavez RN  Outcome: Ongoing, Progressing  9/3/2022 1050 by Kacey Chavez RN  Outcome: Ongoing, Progressing  9/3/2022 1050 by Kacey Chavez RN  Outcome: Ongoing, Not Progressing  9/3/2022 1043 by Kacey Chavez RN  Outcome: Ongoing, Not Progressing  9/3/2022 1042 by Kacey Chavez RN  Outcome: Ongoing, Progressing     Problem: Adjustment to Illness (Stroke, Ischemic/Transient Ischemic Attack)  Goal: Optimal Coping  9/3/2022 1802 by Kacey Chavez RN  Outcome: Ongoing, Progressing  9/3/2022 1050 by Kacey Chavez RN  Outcome: Ongoing, Progressing  9/3/2022 1050 by Kacey Chavez RN  Outcome: Ongoing, Progressing     Problem: Bowel Elimination Impaired (Stroke, Ischemic/Transient Ischemic Attack)  Goal: Effective Bowel Elimination  9/3/2022 1802 by Kacey Chavez RN  Outcome: Ongoing, Progressing  9/3/2022  1050 by Kacey Chavez RN  Outcome: Ongoing, Progressing  9/3/2022 1050 by Kacey Chavez RN  Outcome: Ongoing, Not Progressing     Problem: Cerebral Tissue Perfusion (Stroke, Ischemic/Transient Ischemic Attack)  Goal: Optimal Cerebral Tissue Perfusion  9/3/2022 1802 by Kacey Chavez RN  Outcome: Ongoing, Progressing  9/3/2022 1050 by Kacey Chavez RN  Outcome: Ongoing, Progressing  9/3/2022 1050 by Kacey Chavez RN  Outcome: Ongoing, Not Progressing     Problem: Cognitive Impairment (Stroke, Ischemic/Transient Ischemic Attack)  Goal: Optimal Cognitive Function  9/3/2022 1802 by Kacey Chavez RN  Outcome: Ongoing, Progressing  9/3/2022 1050 by Kacey Chavez RN  Outcome: Ongoing, Progressing  9/3/2022 1050 by Kacey Chavez RN  Outcome: Ongoing, Not Progressing     Problem: Communication Impairment (Stroke, Ischemic/Transient Ischemic Attack)  Goal: Improved Communication Skills  9/3/2022 1802 by Kacey Chavez RN  Outcome: Ongoing, Progressing  9/3/2022 1050 by Kacey Chavez RN  Outcome: Ongoing, Progressing  9/3/2022 1050 by Kacey Chavez RN  Outcome: Ongoing, Not Progressing     Problem: Functional Ability Impaired (Stroke, Ischemic/Transient Ischemic Attack)  Goal: Optimal Functional Ability  9/3/2022 1802 by Kacey Chavez RN  Outcome: Ongoing, Progressing  9/3/2022 1050 by Kacey Chavez RN  Outcome: Ongoing, Progressing  9/3/2022 1050 by Kacey Chavez RN  Outcome: Ongoing, Not Progressing     Problem: Respiratory Compromise (Stroke, Ischemic/Transient Ischemic Attack)  Goal: Effective Oxygenation and Ventilation  9/3/2022 1802 by Kacey Chavez RN  Outcome: Ongoing, Progressing  9/3/2022 1050 by Kacey Chavez RN  Outcome: Ongoing, Progressing  9/3/2022 1050 by Kacey Chavez RN  Outcome: Ongoing, Not Progressing     Problem: Sensorimotor Impairment (Stroke, Ischemic/Transient Ischemic Attack)  Goal: Improved Sensorimotor Function  9/3/2022 1802 by Kacey Chavez RN  Outcome: Ongoing,  Progressing  9/3/2022 1050 by Kacey Chavez RN  Outcome: Ongoing, Progressing  9/3/2022 1050 by Kacey Chavez RN  Outcome: Ongoing, Not Progressing     Problem: Swallowing Impairment (Stroke, Ischemic/Transient Ischemic Attack)  Goal: Optimal Eating and Swallowing without Aspiration  9/3/2022 1802 by Kacey Chavez RN  Outcome: Ongoing, Progressing  9/3/2022 1050 by Kacey Chavez RN  Outcome: Ongoing, Progressing  9/3/2022 1050 by Kacey Chavez RN  Outcome: Ongoing, Not Progressing     Problem: Urinary Elimination Impaired (Stroke, Ischemic/Transient Ischemic Attack)  Goal: Effective Urinary Elimination  9/3/2022 1802 by Kacey Chavez RN  Outcome: Ongoing, Progressing  9/3/2022 1050 by Kacey Chavez RN  Outcome: Ongoing, Progressing  9/3/2022 1050 by Kacey Chavez RN  Outcome: Ongoing, Not Progressing     Problem: Bleeding (Thrombolytic Therapy)  Goal: Absence of Bleeding  Outcome: Ongoing, Progressing

## 2022-09-03 NOTE — NURSING
Received report from NILE Erazo. Patient is stable and awaiting transfer to room 2504 pending call back for report.

## 2022-09-03 NOTE — PLAN OF CARE
09/02/22 2026   Patient Assessment/Suction   Level of Consciousness (AVPU) alert   Respiratory Effort Unlabored   Expansion/Accessory Muscles/Retractions expansion symmetric   All Lung Fields Breath Sounds clear   Rhythm/Pattern, Respiratory depth regular;pattern regular   Cough Frequency infrequent   Cough Type good;nonproductive   PRE-TX-O2   O2 Device (Oxygen Therapy) room air   SpO2 100 %   Pulse Oximetry Type Continuous   $ Pulse Oximetry - Multiple Charge Pulse Oximetry - Multiple   Pulse 77   Resp 18   Education   $ Education DME Oxygen;15 min

## 2022-09-03 NOTE — PLAN OF CARE
Plan of care discussed with patient. Adjusting meds for BP control per MD order. Patient is AAOx4. No deficits noted. Bilateral SCDs in place. Fall precautions reviewed with patient.

## 2022-09-04 VITALS
SYSTOLIC BLOOD PRESSURE: 151 MMHG | DIASTOLIC BLOOD PRESSURE: 75 MMHG | HEART RATE: 79 BPM | TEMPERATURE: 98 F | RESPIRATION RATE: 20 BRPM | OXYGEN SATURATION: 99 % | WEIGHT: 162.06 LBS | BODY MASS INDEX: 27.67 KG/M2 | HEIGHT: 64 IN

## 2022-09-04 LAB
ANION GAP SERPL CALC-SCNC: 4 MMOL/L (ref 8–16)
BUN SERPL-MCNC: 19 MG/DL (ref 6–20)
CALCIUM SERPL-MCNC: 8.6 MG/DL (ref 8.7–10.5)
CHLORIDE SERPL-SCNC: 105 MMOL/L (ref 95–110)
CO2 SERPL-SCNC: 26 MMOL/L (ref 23–29)
CREAT SERPL-MCNC: 0.9 MG/DL (ref 0.5–1.4)
ERYTHROCYTE [DISTWIDTH] IN BLOOD BY AUTOMATED COUNT: 13.2 % (ref 11.5–14.5)
EST. GFR  (NO RACE VARIABLE): >60 ML/MIN/1.73 M^2
GLUCOSE SERPL-MCNC: 212 MG/DL (ref 70–110)
HCT VFR BLD AUTO: 30.7 % (ref 37–48.5)
HGB BLD-MCNC: 10 G/DL (ref 12–16)
MCH RBC QN AUTO: 29.7 PG (ref 27–31)
MCHC RBC AUTO-ENTMCNC: 32.6 G/DL (ref 32–36)
MCV RBC AUTO: 91 FL (ref 82–98)
PLATELET # BLD AUTO: 291 K/UL (ref 150–450)
PMV BLD AUTO: 10.1 FL (ref 9.2–12.9)
POTASSIUM SERPL-SCNC: 3.7 MMOL/L (ref 3.5–5.1)
RBC # BLD AUTO: 3.37 M/UL (ref 4–5.4)
SODIUM SERPL-SCNC: 135 MMOL/L (ref 136–145)
WBC # BLD AUTO: 7.54 K/UL (ref 3.9–12.7)

## 2022-09-04 PROCEDURE — 85027 COMPLETE CBC AUTOMATED: CPT | Performed by: STUDENT IN AN ORGANIZED HEALTH CARE EDUCATION/TRAINING PROGRAM

## 2022-09-04 PROCEDURE — 25000003 PHARM REV CODE 250: Performed by: STUDENT IN AN ORGANIZED HEALTH CARE EDUCATION/TRAINING PROGRAM

## 2022-09-04 PROCEDURE — 82088 ASSAY OF ALDOSTERONE: CPT | Performed by: STUDENT IN AN ORGANIZED HEALTH CARE EDUCATION/TRAINING PROGRAM

## 2022-09-04 PROCEDURE — 84244 ASSAY OF RENIN: CPT | Performed by: STUDENT IN AN ORGANIZED HEALTH CARE EDUCATION/TRAINING PROGRAM

## 2022-09-04 PROCEDURE — 80048 BASIC METABOLIC PNL TOTAL CA: CPT | Performed by: STUDENT IN AN ORGANIZED HEALTH CARE EDUCATION/TRAINING PROGRAM

## 2022-09-04 RX ORDER — HYDROCHLOROTHIAZIDE 12.5 MG/1
12.5 TABLET ORAL DAILY
Qty: 30 TABLET | Refills: 11 | Status: SHIPPED | OUTPATIENT
Start: 2022-09-04 | End: 2022-09-04 | Stop reason: HOSPADM

## 2022-09-04 RX ORDER — CLOPIDOGREL BISULFATE 75 MG/1
75 TABLET ORAL DAILY
Qty: 30 TABLET | Refills: 11 | Status: SHIPPED | OUTPATIENT
Start: 2022-09-05 | End: 2024-03-04

## 2022-09-04 RX ORDER — CARVEDILOL 25 MG/1
25 TABLET ORAL 2 TIMES DAILY
Qty: 60 TABLET | Refills: 11 | Status: SHIPPED | OUTPATIENT
Start: 2022-09-04 | End: 2022-11-10 | Stop reason: SDUPTHER

## 2022-09-04 RX ORDER — HYDROCHLOROTHIAZIDE 12.5 MG/1
12.5 TABLET ORAL DAILY
Status: DISCONTINUED | OUTPATIENT
Start: 2022-09-04 | End: 2022-09-04 | Stop reason: HOSPADM

## 2022-09-04 RX ORDER — NAPROXEN SODIUM 220 MG/1
81 TABLET, FILM COATED ORAL DAILY
Qty: 90 TABLET | Refills: 3 | Status: SHIPPED | OUTPATIENT
Start: 2022-09-05 | End: 2024-03-04

## 2022-09-04 RX ORDER — HYDRALAZINE HYDROCHLORIDE 25 MG/1
100 TABLET, FILM COATED ORAL EVERY 8 HOURS
Status: DISCONTINUED | OUTPATIENT
Start: 2022-09-04 | End: 2022-09-04 | Stop reason: HOSPADM

## 2022-09-04 RX ORDER — AMLODIPINE BESYLATE 5 MG/1
5 TABLET ORAL DAILY
Qty: 30 TABLET | Refills: 11 | Status: SHIPPED | OUTPATIENT
Start: 2022-09-04 | End: 2023-05-29 | Stop reason: ALTCHOICE

## 2022-09-04 RX ORDER — HYDRALAZINE HYDROCHLORIDE 100 MG/1
100 TABLET, FILM COATED ORAL EVERY 8 HOURS
Qty: 90 TABLET | Refills: 11 | Status: SHIPPED | OUTPATIENT
Start: 2022-09-04 | End: 2023-05-29 | Stop reason: SINTOL

## 2022-09-04 RX ORDER — CHLORTHALIDONE 25 MG/1
25 TABLET ORAL DAILY
Status: DISCONTINUED | OUTPATIENT
Start: 2022-09-04 | End: 2022-09-04

## 2022-09-04 RX ORDER — HYDROCHLOROTHIAZIDE 12.5 MG/1
12.5 TABLET ORAL DAILY
Qty: 30 TABLET | Refills: 11 | Status: SHIPPED | OUTPATIENT
Start: 2022-09-04 | End: 2023-05-29 | Stop reason: ALTCHOICE

## 2022-09-04 RX ADMIN — HYDRALAZINE HYDROCHLORIDE 50 MG: 25 TABLET, FILM COATED ORAL at 06:09

## 2022-09-04 RX ADMIN — ASPIRIN 81 MG CHEWABLE TABLET 81 MG: 81 TABLET CHEWABLE at 08:09

## 2022-09-04 RX ADMIN — CARVEDILOL 25 MG: 25 TABLET, FILM COATED ORAL at 08:09

## 2022-09-04 RX ADMIN — CLOPIDOGREL BISULFATE 75 MG: 75 TABLET, FILM COATED ORAL at 08:09

## 2022-09-04 RX ADMIN — CHLORHEXIDINE GLUCONATE 15 ML: 1.2 RINSE ORAL at 08:09

## 2022-09-04 RX ADMIN — VALSARTAN 160 MG: 80 TABLET, FILM COATED ORAL at 08:09

## 2022-09-04 RX ADMIN — MUPIROCIN 1 G: 20 OINTMENT TOPICAL at 08:09

## 2022-09-04 NOTE — PROGRESS NOTES
[facility] Medicine  Progress Note    Patient Name: Mayra Davis  MRN: [id number]  Patient Class: IP- Inpatient   Admission Date: 9/1/2022  Length of Stay: 2 days  Attending Physician: Kris Reyez, *  Primary Care Provider: Teresita Darden MD        Subjective:     Principal Problem:S/P admn tPA in diff fac w/n last 24 hr bef adm to crnt fac        HPI:  46 years old female with past medical history of COVID-19 infection 2 and half months ago heart failure with ejection fraction 65% on echo 2022 diabetes not using insulin at home hypertension legally blind she was transferred from outside facility because she was having headaches for the past 3 days also though be partially relieved with Aleve she also noticed that she was having hypertension so she decided consult the ER when she arrived to the ER she states she was having episodes of chest pain with left arm pain and tingling as and he was noticed in the ER that she was having pronator drift and left lower extremity weakness as well is a so she received CT head CTA head and neck and tPA she was also hypertensive so she received hydralazine clonidine valsartan labetalol and ultimately was started on nicardipine drip.  She states that with medications her chest pain improved the chest pain had radiation to her back.  She was transferred to our facility due to in absence of ICU beds at outside facility.      Interval History  9/1:  Patient doing well and currently has no neurological deficit.  MRI brain pending and continue to hold antiplatelet pending repeat CT after tPA.    9/2- alert, comfortable.  No focal neuro deficit.  tPA yesterday.  HTN emergency prior to stroke like symptoms.  Still on cardene.  Attempt to wean cardizem and step down from ICU    9/3- remains on cardene drip at 2.5 mg, feels well only complaint is occasional headache.  Will stop cardene and step down.        Physical exam  Gen-  comfortable  Neuro-aaox3, moves all extremities, sensation intact.  Numbness and tingling, weakness resolved   CV- RRR, hypertensive  Pulm- ctab  GI-  soft, nt/nd, +bs  Skin- warm, dry      Assessment/Plan:      46 years old female with past medical history of diastolic heart failure with ejection fraction 65% on echo 2022 diabetes not using insulin at home, hypertension, legally blind she was transferred from outside facility because she was having headaches for the past 3 days and was having episodes of chest pain, left arm pain and tingling noted  in ER that she had pronator drift and left lower extremity weakness. CT head CTA head and neck were negative, she received tPA.  She had hyn emergency, given po meds, IV bp meds and eventually started on nicardipine drip.  She was transferred to our facility for ICU care      Hypertensive emerency  TIA, r/o CVA  S/p tPA  Sbp >200, + for HA and chest pain.  She notes previous episodes of severe HTN and headache.  She received tPA at OSH, unclear if patient had symptoms r/t HTN emergency, TIA vs ischemic stroke  -Head CT and Head/neck CTA unrevealing  -neurology consulted, appreciate recs  -ICU care and q1 hr neuro checks s/p tPA, complete  -MRI negative for stroke  -secondary prevention DAPt and statin  -PT/OT/ST  -a1c, lipid profile, tsh  -cardene infusion for bp control can be stopped today  -added prn iv hydralazine and PO hydralazine scheduled  -continue PO bp meds coreg and valsartan  -consider w/u for secondary HTN   -renin/betty, renal doppler    VTE Risk Mitigation (From admission, onward)           Ordered     Place sequential compression device  Until discontinued         09/01/22 0416                    Discharge Planning   TAI: 9/3/2022     Code Status: Prior   Is the patient medically ready for discharge?:     Reason for patient still in hospital (select all that apply): Patient trending condition  Discharge Plan A: Home with family                   Kirs Reyez MD  Department of Hospital Medicine   [facility]

## 2022-09-04 NOTE — NURSING TRANSFER
Nursing Transfer Note      9/3/2022     Reason patient is being transferred: downgraded from ICU    Transfer to 2504 from 3027    Transfer via wheelchair    Transfer with cardiac monitoring    Transported by NILE Hanna    Medicines sent: none    Any special needs or follow-up needed: discussed with nurse    Chart send with patient: Yes    Notified: spouse (with patient at time of transfer)    Upon arrival to floor: patient oriented to room, call bell in reach, and bed in lowest position.

## 2022-09-04 NOTE — PLAN OF CARE
09/04/22 1453   Medicare Message   Important Message from Medicare regarding Discharge Appeal Rights Given to patient/caregiver;Explained to patient/caregiver;Signed/date by patient/caregiver   Date IMM was signed 09/04/22   Time IMM was signed [id number]

## 2022-09-04 NOTE — PLAN OF CARE
09/04/22 1455   Final Note   Assessment Type Final Discharge Note   Anticipated Discharge Disposition Home   What phone number can be called within the next 1-3 days to see how you are doing after discharge? [phone]   Hospital Resources/Appts/Education Provided Provided patient/caregiver with written discharge plan information   Post-Acute Status   Coverage [employer]   Discharge Delays None known at this time     Pt is discharging home with family and has no identified CM needs. Pt is clear to discharge from a CM standpoint.

## 2022-09-04 NOTE — PLAN OF CARE
Problem: Adult Inpatient Plan of Care  Goal: Absence of Hospital-Acquired Illness or Injury  Outcome: Ongoing, Progressing  Goal: Optimal Comfort and Wellbeing  Outcome: Ongoing, Progressing  Goal: Readiness for Transition of Care  Outcome: Ongoing, Progressing     Problem: Diabetes Comorbidity  Goal: Blood Glucose Level Within Targeted Range  Outcome: Ongoing, Progressing     Problem: Adjustment to Illness (Stroke, Ischemic/Transient Ischemic Attack)  Goal: Optimal Coping  Outcome: Ongoing, Progressing     Problem: Bowel Elimination Impaired (Stroke, Ischemic/Transient Ischemic Attack)  Goal: Effective Bowel Elimination  Outcome: Ongoing, Progressing     Problem: Cerebral Tissue Perfusion (Stroke, Ischemic/Transient Ischemic Attack)  Goal: Optimal Cerebral Tissue Perfusion  Outcome: Ongoing, Progressing     Problem: Cognitive Impairment (Stroke, Ischemic/Transient Ischemic Attack)  Goal: Optimal Cognitive Function  Outcome: Ongoing, Progressing     Problem: Communication Impairment (Stroke, Ischemic/Transient Ischemic Attack)  Goal: Improved Communication Skills  Outcome: Ongoing, Progressing     Problem: Functional Ability Impaired (Stroke, Ischemic/Transient Ischemic Attack)  Goal: Optimal Functional Ability  Outcome: Ongoing, Progressing     Problem: Respiratory Compromise (Stroke, Ischemic/Transient Ischemic Attack)  Goal: Effective Oxygenation and Ventilation  Outcome: Ongoing, Progressing     Problem: Sensorimotor Impairment (Stroke, Ischemic/Transient Ischemic Attack)  Goal: Improved Sensorimotor Function  Outcome: Ongoing, Progressing     Problem: Urinary Elimination Impaired (Stroke, Ischemic/Transient Ischemic Attack)  Goal: Effective Urinary Elimination  Outcome: Ongoing, Progressing     Problem: Bleeding (Thrombolytic Therapy)  Goal: Absence of Bleeding  Outcome: Ongoing, Progressing     Problem: Fall Injury Risk  Goal: Absence of Fall and Fall-Related Injury  Outcome: Met     Problem: Swallowing  Impairment (Stroke, Ischemic/Transient Ischemic Attack)  Goal: Optimal Eating and Swallowing without Aspiration  Outcome: Met

## 2022-09-06 NOTE — DISCHARGE SUMMARY
[facility]  Discharge Summary  Patient Name: Mayra Davis MRN: [id number]   Patient Class: IP- Inpatient  Length of Stay: 3   Admission Date: 9/1/2022  1:54 AM Attending Physician: Elroy Reyez MD   Primary Care Provider: Teresita Darden MD Face-to-Face encounter date: 9/4/22   Chief Complaint: No chief complaint on file.    Date of Discharge: 9/4/22  Discharge Disposition:Home or Self Care    Condition: Stable       Reason for Hospitalization   Hypertensive emergency, possible CVA s/p tPA    Patient Active Problem List   Diagnosis    Acute diastolic CHF (congestive heart failure)    Acute ischemic right MCA stroke    S/P admn tPA in diff fac w/n last 24 hr bef adm to crnt fac    Anemia    HTN (hypertension)       Brief History of Present Illness     46 years old female with past medical history of COVID-19 infection 2 and half months ago heart failure with ejection fraction 65% on echo 2022 diabetes not using insulin at home hypertension legally blind she was transferred from outside facility because she was having headaches for the past 3 days also though be partially relieved with Aleve she also noticed that she was having hypertension so she decided consult the ER when she arrived to the ER she states she was having episodes of chest pain with left arm pain and tingling as and he was noticed in the ER that she was having pronator drift and left lower extremity weakness as well is a so she received CT head CTA head and neck and tPA she was also hypertensive so she received hydralazine clonidine valsartan labetalol and ultimately was started on nicardipine drip.  She states that with medications her chest pain improved the chest pain had radiation to her back.  She was transferred to our facility due to in absence of ICU beds at outside facility. Admitted to ICU for HTN emergency, miguel macias.      Hospital Course By Problem with Pertinent Findings     Hypertensive emerency  TIA, r/o CVA  S/p  "tPA  Sbp >200, + for HA and chest pain.  She notes previous episodes of severe HTN and headache.  She received tPA at OSH, unclear if patient had symptoms r/t HTN emergency, TIA vs ischemic stroke  -Head CT and Head/neck CTA unrevealing  -neurology consulted, appreciate recs  -ICU care and q1 hr neuro checks s/p tPA, complete  -MRI negative for stroke  -secondary prevention DAPt and statin  -PT/OT/ST  -a1c, lipid profile, tsh  -cardene infusion for bp control can be stopped today  -added prn iv hydralazine and PO hydralazine scheduled  -continue PO bp meds coreg and valsartan  -start amlodipine and hctz  -start w/u secondary HTN   -renin/betty still in process, renal doppler negative for stenosis  -discharged on valsartan, coreg, hydralazine, amlodipine hctz  -stopped clonidine, suspect some of her problem was rebound htn    Patient was seen and examined on the date of discharge and determined to be suitable for discharge.    Physical Exam  BP (!) 151/75   Pulse 79   Temp 98.4 °F (36.9 °C) (Oral)   Resp 20   Ht 5' 4" (1.626 m)   Wt 73.5 kg (162 lb 0.6 oz)   SpO2 99%   Breastfeeding No   BMI 27.81 kg/m²   Vitals reviewed.    Constitutional: No distress.   HENT: Atraumatic. Blind   Cardiovascular: Normal rate, regular rhythm and normal heart sounds.   Pulmonary/Chest: Effort normal. Clear to auscultation bilaterally. No wheezes.   Abdominal: Soft. Bowel sounds are normal. Exhibits no distension and no mass. No tenderness  Neurological: Alert.   Skin: Skin is warm and dry.     Following labs were Reviewed   No results for input(s): WBC, HGB, HCT, PLT, GLUCOSE, CALCIUM, ALBUMIN, PROT, NA, K, CO2, CL, BUN, CREATININE, ALKPHOS, ALT, AST, BILITOT in the last 24 hours.  No results found for: POCTGLUCOSE     CMP   Recent Labs   Lab 09/04/22  1341   *   K 3.7      CO2 26   *   BUN 19   CREATININE 0.9   CALCIUM 8.6*   ANIONGAP 4*   , CBC   Recent Labs   Lab 09/04/22  1341   WBC 7.54   HGB 10.0*   HCT " 30.7*      , and All labs within the past 24 hours have been reviewed    Microbiology Results (last 7 days)       Procedure Component Value Units Date/Time    Culture, Respiratory with Gram Stain [[id number]]     Order Status: Canceled Specimen: Respiratory           US Doppler Renal Artery and Vein (xpd)   Final Result      CT Head Without Contrast   Final Result      MRI Brain Without Contrast   Final Result          No results found for this or any previous visit.      Consultants and Procedures   Consultants:  Consults (From admission, onward)          Status Ordering Provider     Inpatient consult to Neurology  Once        Provider:  Charisma Grayson MD    Completed ARTGRACIE ISAACS     IP consult to case management  Once        Provider:  (Not yet assigned)    Completed ARTFERNY GRACIE            Procedures:   none    Discharge Information:   Diet:  Resume Cardiac diet/Diabetic Diet    Physical Activity:  Activity as tolerated    Instructions:  1. Take all medications as prescribed  2. Keep all follow-up appointments  3. Return to the hospital or call your primary care physicians if any worsening symptoms such as severe headache, chest pain, shortness of breath occur.      Follow-Up Appointments:  Please call your primary care physician to schedule an appointment in 1 week time.       Follow-up Information       Teresita Darden MD Follow up in 1 week(s).    Specialty: Family Medicine  Why: follow up blood pressure on discharge regimen, off clonidine  Contact information:  [facility] [facility] [address]  [phone]                               Pending laboratory work/Tests to be performed/followed by the Primary care Physician:    The patient was discharged in the care of her /family/caregiver, with discharge instructions were reviewed in written and verbal form. All pertinent questions were discussed and prescriptions were provided. The importance of making  follow up appointments and compliance of medications has been stressed repeatedly. The patient will follow up in 1 week or sooner as needed with the PCP, and the patient is on board with the plan. Upon discharge, patient needs to be on following medications.    Discharge Medications:     Medication List        START taking these medications      amLODIPine 5 MG tablet  Commonly known as: NORVASC  Take 1 tablet (5 mg total) by mouth once daily.     aspirin 81 MG Chew  Take 1 tablet (81 mg total) by mouth once daily.     carvediloL 25 MG tablet  Commonly known as: COREG  Take 1 tablet (25 mg total) by mouth 2 (two) times daily.     clopidogreL 75 mg tablet  Commonly known as: PLAVIX  Take 1 tablet (75 mg total) by mouth once daily.     hydroCHLOROthiazide 12.5 MG Tab  Commonly known as: HYDRODIURIL  Take 1 tablet (12.5 mg total) by mouth once daily.            CHANGE how you take these medications      * hydrALAZINE 25 MG tablet  Commonly known as: APRESOLINE  Take 1 tablet (25 mg total) by mouth 3 (three) times daily.  What changed: Another medication with the same name was added. Make sure you understand how and when to take each.     * hydrALAZINE 100 MG tablet  Commonly known as: APRESOLINE  Take 1 tablet (100 mg total) by mouth every 8 (eight) hours.  What changed: You were already taking a medication with the same name, and this prescription was added. Make sure you understand how and when to take each.           * This list has 2 medication(s) that are the same as other medications prescribed for you. Read the directions carefully, and ask your doctor or other care provider to review them with you.                CONTINUE taking these medications      ALPRAZolam 1 MG tablet  Commonly known as: XANAX     atorvastatin 20 MG tablet  Commonly known as: LIPITOR  Take 1 tablet (20 mg total) by mouth once daily.     busPIRone 7.5 MG tablet  Commonly known as: BUSPAR     COMBIGAN 0.2-0.5 % Drop  Generic drug:  brimonidine-timoloL     * furosemide 40 MG tablet  Commonly known as: LASIX  Take 1 tablet (40 mg total) by mouth once daily.     * furosemide 20 MG tablet  Commonly known as: LASIX     glipiZIDE 10 MG tablet  Commonly known as: GLUCOTROL     JANUMET XR  mg Tm24  Generic drug: SITagliptan-metformin     lamoTRIgine 50 mg Tr24     metFORMIN 500 MG tablet  Commonly known as: GLUCOPHAGE  Take 2 tablets (1,000 mg total) by mouth 2 (two) times daily with meals.     omeprazole 20 MG capsule  Commonly known as: PRILOSEC     OXcarbazepine 150 MG Tab  Commonly known as: TRILEPTAL     pantoprazole 40 MG tablet  Commonly known as: PROTONIX     potassium chloride 10 MEQ Cpsr  Commonly known as: MICRO-K     prednisoLONE acetate 1 % Drps  Commonly known as: PRED FORTE     QUEtiapine 25 MG Tab  Commonly known as: SEROQUEL     TRESIBA FLEXTOUCH U-100 100 unit/mL (3 mL) insulin pen  Generic drug: insulin degludec     valsartan 160 MG tablet  Commonly known as: DIOVAN  Take 1 tablet (160 mg total) by mouth 2 (two) times daily.     VITAMIN D3 1000 units Tab  Generic drug: vitamin D     zolpidem 10 mg Tab  Commonly known as: AMBIEN           * This list has 2 medication(s) that are the same as other medications prescribed for you. Read the directions carefully, and ask your doctor or other care provider to review them with you.                STOP taking these medications      cloNIDine 0.1 MG tablet  Commonly known as: CATAPRES     losartan 50 MG tablet  Commonly known as: COZAAR     pravastatin 20 MG tablet  Commonly known as: PRAVACHOL               Where to Get Your Medications        These medications were sent to [facility]/pharmacy #[id number] - [place] - [address]  [address] [place] [id number]      Hours: 24-hours Phone: [phone]   amLODIPine 5 MG tablet  aspirin 81 MG Chew  carvediloL 25 MG tablet  clopidogreL 75 mg tablet  hydrALAZINE 100 MG tablet  hydroCHLOROthiazide 12.5 MG Tab           I spent 40 minutes  preparing the discharge including reviewing records from previous encounters, preparation of discharge summary, assessing and final examination of the patient, discharge medicine reconciliation, discussing plan of care, follow up and education and prescriptions.       Kris Reyez  [facility] Hospitalist  09/05/2022

## 2022-09-09 LAB — RENIN PLAS-CCNC: 0.41 NG/ML/HR (ref 0.17–5.38)

## 2022-09-11 LAB — ALDOST SERPL-MCNC: <1 NG/DL (ref 0–30)

## 2022-11-10 ENCOUNTER — OFFICE VISIT (OUTPATIENT)
Dept: CARDIOLOGY | Facility: CLINIC | Age: 47
End: 2022-11-10
Payer: MEDICARE

## 2022-11-10 VITALS
BODY MASS INDEX: 26.12 KG/M2 | HEIGHT: 64 IN | DIASTOLIC BLOOD PRESSURE: 92 MMHG | SYSTOLIC BLOOD PRESSURE: 209 MMHG | HEART RATE: 86 BPM | WEIGHT: 153 LBS

## 2022-11-10 DIAGNOSIS — I10 HYPERTENSION, UNSPECIFIED TYPE: ICD-10-CM

## 2022-11-10 DIAGNOSIS — I50.31 ACUTE DIASTOLIC CHF (CONGESTIVE HEART FAILURE): ICD-10-CM

## 2022-11-10 DIAGNOSIS — D64.9 ANEMIA, UNSPECIFIED TYPE: ICD-10-CM

## 2022-11-10 DIAGNOSIS — I63.511 ACUTE ISCHEMIC RIGHT MCA STROKE: Primary | ICD-10-CM

## 2022-11-10 PROCEDURE — 3080F DIAST BP >= 90 MM HG: CPT | Mod: CPTII,S$GLB,, | Performed by: INTERNAL MEDICINE

## 2022-11-10 PROCEDURE — 3051F PR MOST RECENT HEMOGLOBIN A1C LEVEL 7.0 - < 8.0%: ICD-10-PCS | Mod: CPTII,S$GLB,, | Performed by: INTERNAL MEDICINE

## 2022-11-10 PROCEDURE — 1160F RVW MEDS BY RX/DR IN RCRD: CPT | Mod: CPTII,S$GLB,, | Performed by: INTERNAL MEDICINE

## 2022-11-10 PROCEDURE — 1159F PR MEDICATION LIST DOCUMENTED IN MEDICAL RECORD: ICD-10-PCS | Mod: CPTII,S$GLB,, | Performed by: INTERNAL MEDICINE

## 2022-11-10 PROCEDURE — 4010F ACE/ARB THERAPY RXD/TAKEN: CPT | Mod: CPTII,S$GLB,, | Performed by: INTERNAL MEDICINE

## 2022-11-10 PROCEDURE — 99999 PR PBB SHADOW E&M-EST. PATIENT-LVL IV: ICD-10-PCS | Mod: PBBFAC,,, | Performed by: INTERNAL MEDICINE

## 2022-11-10 PROCEDURE — 3008F PR BODY MASS INDEX (BMI) DOCUMENTED: ICD-10-PCS | Mod: CPTII,S$GLB,, | Performed by: INTERNAL MEDICINE

## 2022-11-10 PROCEDURE — 3077F PR MOST RECENT SYSTOLIC BLOOD PRESSURE >= 140 MM HG: ICD-10-PCS | Mod: CPTII,S$GLB,, | Performed by: INTERNAL MEDICINE

## 2022-11-10 PROCEDURE — 99214 OFFICE O/P EST MOD 30 MIN: CPT | Mod: S$GLB,,, | Performed by: INTERNAL MEDICINE

## 2022-11-10 PROCEDURE — 99999 PR PBB SHADOW E&M-EST. PATIENT-LVL IV: CPT | Mod: PBBFAC,,, | Performed by: INTERNAL MEDICINE

## 2022-11-10 PROCEDURE — 99214 PR OFFICE/OUTPT VISIT, EST, LEVL IV, 30-39 MIN: ICD-10-PCS | Mod: S$GLB,,, | Performed by: INTERNAL MEDICINE

## 2022-11-10 PROCEDURE — 3008F BODY MASS INDEX DOCD: CPT | Mod: CPTII,S$GLB,, | Performed by: INTERNAL MEDICINE

## 2022-11-10 PROCEDURE — 3080F PR MOST RECENT DIASTOLIC BLOOD PRESSURE >= 90 MM HG: ICD-10-PCS | Mod: CPTII,S$GLB,, | Performed by: INTERNAL MEDICINE

## 2022-11-10 PROCEDURE — 4010F PR ACE/ARB THEARPY RXD/TAKEN: ICD-10-PCS | Mod: CPTII,S$GLB,, | Performed by: INTERNAL MEDICINE

## 2022-11-10 PROCEDURE — 3077F SYST BP >= 140 MM HG: CPT | Mod: CPTII,S$GLB,, | Performed by: INTERNAL MEDICINE

## 2022-11-10 PROCEDURE — 1159F MED LIST DOCD IN RCRD: CPT | Mod: CPTII,S$GLB,, | Performed by: INTERNAL MEDICINE

## 2022-11-10 PROCEDURE — 1160F PR REVIEW ALL MEDS BY PRESCRIBER/CLIN PHARMACIST DOCUMENTED: ICD-10-PCS | Mod: CPTII,S$GLB,, | Performed by: INTERNAL MEDICINE

## 2022-11-10 PROCEDURE — 3051F HG A1C>EQUAL 7.0%<8.0%: CPT | Mod: CPTII,S$GLB,, | Performed by: INTERNAL MEDICINE

## 2022-11-10 RX ORDER — MINOXIDIL 2.5 MG/1
2.5 TABLET ORAL 2 TIMES DAILY
Qty: 60 TABLET | Refills: 11 | Status: SHIPPED | OUTPATIENT
Start: 2022-11-10 | End: 2023-05-29 | Stop reason: SDUPTHER

## 2022-11-10 RX ORDER — CARVEDILOL 25 MG/1
37.5 TABLET ORAL 2 TIMES DAILY
Qty: 90 TABLET | Refills: 11 | Status: SHIPPED | OUTPATIENT
Start: 2022-11-10 | End: 2023-07-28 | Stop reason: SDUPTHER

## 2022-11-10 NOTE — PROGRESS NOTES
Subjective:    Patient ID:  Mayra Davis is a 46 y.o. female patient here for evaluation Establish Care and Hypertension (When taking prednisone and other medication)      History of Present Illness:  New patient cardiac evaluation.  Patient hospitalized in August September of this year with CVA, imaging studies overall unremarkable including CT of the head, MRI of the brain, CTA of the head neck.  Patient did receive RT PA.  No neurologic deficits.    Cardiology referral for treatment of high blood pressure.  Recent renal artery ultrasound read as suggestive of renal artery stenosis.    Patient denies chest pain shortness of breath no arrhythmia.    Screening studies for cardioembolic source of CVA negative.  No evidence of atrial fibrillation.  Echo with bubble contrast, negative shunt, preserved ejection fraction.     No past documented chronic kidney disease, peptic disease.  No history of CVA.  No history of GI bleed or blood transfusion.    History of severe diabetic retinopathy, 8 surgeries, legal blindness.    Review of patient's allergies indicates:   Allergen Reactions    Vancomycin analogues Rash       History reviewed. No pertinent past medical history.  History reviewed. No pertinent surgical history.        Review of Systems:    As noted in HPI in addition         REVIEW OF SYSTEMS  Review of Systems   Constitutional: Negative for decreased appetite, diaphoresis, night sweats, weight gain and weight loss.   HENT:  Negative for nosebleeds and odynophagia.    Eyes:  Negative for double vision and photophobia.   Cardiovascular:  Negative for chest pain, claudication, cyanosis, dyspnea on exertion, irregular heartbeat, leg swelling, near-syncope, orthopnea, palpitations, paroxysmal nocturnal dyspnea and syncope.   Respiratory:  Negative for cough, hemoptysis, shortness of breath and wheezing.    Hematologic/Lymphatic: Negative for adenopathy.   Skin:  Negative for flushing, skin cancer and suspicious  lesions.   Musculoskeletal:  Negative for gout, myalgias and neck pain.   Gastrointestinal:  Negative for abdominal pain, heartburn, hematemesis and hematochezia.   Genitourinary:  Negative for bladder incontinence, hesitancy and nocturia.   Neurological:  Negative for focal weakness, headaches, light-headedness and paresthesias.   Psychiatric/Behavioral:  Negative for memory loss and substance abuse.      Objective:        Vitals:    11/10/22 0840   BP: (!) 209/92   Pulse: 86       Lab Results   Component Value Date    WBC 7.54 09/04/2022    HGB 10.0 (L) 09/04/2022    HCT 30.7 (L) 09/04/2022     09/04/2022    CHOL 155 08/31/2022    TRIG 154 (H) 08/31/2022    HDL 77 (H) 08/31/2022    ALT 17 08/31/2022    AST 17 08/31/2022     (L) 09/04/2022    K 3.7 09/04/2022     09/04/2022    CREATININE 0.9 09/04/2022    BUN 19 09/04/2022    CO2 26 09/04/2022    TSH 2.628 08/31/2022    INR 1.0 08/31/2022    HGBA1C 7.1 (H) 09/01/2022      CARDIOGRAM RESULTS  Results for orders placed during the hospital encounter of 09/01/22    Echo Saline Bubble? Yes    Interpretation Summary  · The left ventricle is normal in size with moderate concentric hypertrophy and normal systolic function.  · The estimated ejection fraction is 65%.  · Normal left ventricular diastolic function.  · Normal right ventricular size with normal right ventricular systolic function.  · No interatrial septal defect present.  · Mild tricuspid regurgitation.        CURRENT/PREVIOUS VISIT EKG  Results for orders placed or performed during the hospital encounter of 08/31/22   EKG 12-lead    Collection Time: 08/31/22  3:55 PM    Narrative    Test Reason : R07.89,    Vent. Rate : 080 BPM     Atrial Rate : 080 BPM     P-R Int : 180 ms          QRS Dur : 078 ms      QT Int : 382 ms       P-R-T Axes : 055 -07 065 degrees     QTc Int : 440 ms    Normal sinus rhythm  Anterior infarct (cited on or before 16-MAY-2022)  Abnormal ECG  When compared with ECG of  16-MAY-2022 16:41,  No significant change was found  Confirmed by Brad De León MD ([id number]) on 9/5/2022 5:38:58 PM    Referred By: IGNACIO   SELF           Confirmed By:Brad De León MD     No valid procedures specified.   Results for orders placed during the hospital encounter of 05/16/22    Nuclear Stress Test    Interpretation Summary    The EKG portion of this study is negative for ischemia.    There were no arrhythmias during stress.    No valid procedures specified.    PHYSICAL EXAM  CONSTITUTIONAL: Well built, well nourished in no apparent distress  NECK: no carotid bruit, no JVD  LUNGS: CTA  CHEST WALL: no tenderness,  HEART: regular rate and rhythm, S1, S2 normal, no murmur, click, rub or gallop   ABDOMEN: soft, non-tender; bowel sounds normal; no masses,  no organomegaly  EXTREMITIES: Extremities normal, no edema, no calf tenderness noted  VASCULAR EXAM: 2 PLUS UPPER AND LOWER EXT PULSES  NEURO: AAO X 3, NO ACUTE FOCAL OR LATERALIZING FINDINGS    I HAVE REVIEWED :    The vital signs, nurses notes, and all the pertinent radiology and labs.         Current Outpatient Medications   Medication Instructions    ALPRAZolam (XANAX) 1 mg, Oral, 3 times daily PRN    amLODIPine (NORVASC) 5 mg, Oral, Daily    aspirin 81 mg, Oral, Daily    atorvastatin (LIPITOR) 20 mg, Oral, Daily    busPIRone (BUSPAR) 7.5 mg, Oral, 3 times daily    carvediloL (COREG) 25 mg, Oral, 2 times daily    clopidogreL (PLAVIX) 75 mg, Oral, Daily    COMBIGAN 0.2-0.5 % Drop Both Eyes    furosemide (LASIX) 40 mg, Oral, Daily    furosemide (LASIX) 20 mg, Oral, Every morning    glipiZIDE (GLUCOTROL) 10 mg, Oral, 2 times daily    hydrALAZINE (APRESOLINE) 100 mg, Oral, Every 8 hours    hydroCHLOROthiazide (HYDRODIURIL) 12.5 mg, Oral, Daily    JANUMET XR  mg TM24 1 tablet, Oral, Daily    lamoTRIgine 50 mg TR24 1 tablet, Oral, Daily    metFORMIN (GLUCOPHAGE) 1,000 mg, Oral, 2 times daily with meals    omeprazole (PRILOSEC) 20 mg, Oral, Daily     OXcarbazepine (TRILEPTAL) 150 mg, Oral, 2 times daily    pantoprazole (PROTONIX) 40 mg, Oral, Daily    potassium chloride (MICRO-K) 10 MEQ CpSR 10 mEq, Oral, Daily    prednisoLONE acetate (PRED FORTE) 1 % DrpS 1 drop, Both Eyes, 4 times daily    QUEtiapine (SEROQUEL) 25 mg, Oral, Nightly    TRESIBA FLEXTOUCH U-100 100 unit/mL (3 mL) insulin pen Subcutaneous    valsartan (DIOVAN) 160 mg, Oral, 2 times daily    VITAMIN D3 1,000 Units, Oral, Daily    zolpidem (AMBIEN) 10 mg, Oral, Nightly PRN          Assessment:   Resistant hypertension.  Hypertensive cardiovascular disease.  History of CVA, no neurologic sequelae with negative imaging studies.  Rule out underlying renal artery stenosis.  Diabetes mellitus dyslipidemia.  Recent hemoglobin A1cs markedly improved.  Severe diabetic retinopathy with legal blindness.      Plan:   Patient remains on Diovan 160 b.i.d., hydralazine 100 t.i.d., Coreg 25 b.i.d..  Suggest increasing Coreg to 37.5 b.i.d., add minoxidil 2.5 b.i.d.  Suggest repeat renal artery ultrasound prior to contrast imaging of the renal arteries.  Return to clinic results.        No follow-ups on file.

## 2022-11-18 ENCOUNTER — CLINICAL SUPPORT (OUTPATIENT)
Dept: CARDIOLOGY | Facility: HOSPITAL | Age: 47
End: 2022-11-18
Attending: INTERNAL MEDICINE
Payer: MEDICARE

## 2022-11-18 DIAGNOSIS — I63.511 ACUTE ISCHEMIC RIGHT MCA STROKE: ICD-10-CM

## 2022-11-18 DIAGNOSIS — D64.9 ANEMIA, UNSPECIFIED TYPE: ICD-10-CM

## 2022-11-18 DIAGNOSIS — I50.31 ACUTE DIASTOLIC CHF (CONGESTIVE HEART FAILURE): ICD-10-CM

## 2022-11-18 DIAGNOSIS — I10 HYPERTENSION, UNSPECIFIED TYPE: ICD-10-CM

## 2022-11-18 PROCEDURE — 93975 VASCULAR STUDY: CPT | Mod: PO

## 2022-11-18 PROCEDURE — 93975 CV US RENAL ARTERY STENOSIS HYPERTENSION COMPLETE (CUPID ONLY): ICD-10-PCS | Mod: 26,,, | Performed by: INTERNAL MEDICINE

## 2022-11-18 PROCEDURE — 93975 VASCULAR STUDY: CPT | Mod: 26,,, | Performed by: INTERNAL MEDICINE

## 2022-11-21 LAB
ABDOMINAL AORTA PROX EDV: 17 CM/S
ABDOMINAL AORTA PROX PSV: 116 CM/S
LEFT RENAL DIST DIAS: 30 CM/S
LEFT RENAL DIST SYS: 136 CM/S
LEFT RENAL MID DIAS: 31 CM/S
LEFT RENAL MID SYS: 133 CM/S
LEFT RENAL ORIGIN DIAS: 20 CM/S
LEFT RENAL ORIGIN SYS: 99 CM/S
LEFT RENAL PROX DIAS: 17 CM/S
LEFT RENAL PROX RAR: 1.04
LEFT RENAL PROX SYS: 121 CM/S
LEFT RENAL ULTRASOUND ACCELERATION TIME MEASUREMENT 1: 34 MS
LEFT RENAL ULTRASOUND ACCELERATION TIME MEASUREMENT 2: 34 MS
LEFT RENAL ULTRASOUND ACCELERATION TIME MEASUREMENT 3: 37 MS
LEFT RENAL ULTRASOUND ACCELERATION TIME MEASUREMENT AVERAGE: 37 MS
LEFT RENAL ULTRASOUND KIDNEY SIZE MEASUREMENT 1: 11.5 CM
LEFT RENAL ULTRASOUND KIDNEY SIZE MEASUREMENT 2: 10.3 CM
LEFT RENAL ULTRASOUND KIDNEY SIZE MEASUREMENT 3: 10.6 CM
LEFT RENAL ULTRASOUND KIDNEY SIZE MEASUREMENT AVERAGE: 11.5 CM
LEFT RENAL ULTRASOUND RESISTIVE INDEX MEASUREMENT 1: 0.62
LEFT RENAL ULTRASOUND RESISTIVE INDEX MEASUREMENT 2: 0.64
LEFT RENAL ULTRASOUND RESISTIVE INDEX MEASUREMENT 3: 0.59
LEFT RENAL ULTRASOUND RESISTIVE INDEX MEASUREMENT AVERAGE: 0.64
OHS CV LEFT RENAL RAR: 1.17
OHS CV RIGHT RENAL RAR: 1.08
OHS CV US LEFT RENAL HIGHEST EDV: 31
OHS CV US LEFT RENAL HIGHEST PSV: 136
OHS CV US RIGHT RENAL HIGHEST EDV: 24
OHS CV US RIGHT RENAL HIGHEST PSV: 125
RIGHT RENAL DIST DIAS: 22 CM/S
RIGHT RENAL DIST SYS: 112 CM/S
RIGHT RENAL MID DIAS: 21 CM/S
RIGHT RENAL MID SYS: 119 CM/S
RIGHT RENAL ORIGIN DIAS: 24 CM/S
RIGHT RENAL ORIGIN SYS: 125 CM/S
RIGHT RENAL PROX DIAS: 21 CM/S
RIGHT RENAL PROX RAR: 0.92
RIGHT RENAL PROX SYS: 107 CM/S
RIGHT RENAL ULTRASOUND ACCELERATION TIME MEASUREMENT 1: 25 MS
RIGHT RENAL ULTRASOUND ACCELERATION TIME MEASUREMENT 2: 31 MS
RIGHT RENAL ULTRASOUND ACCELERATION TIME MEASUREMENT 3: 42 MS
RIGHT RENAL ULTRASOUND ACCELERATION TIME MEASUREMENT AVERAGE: 42 MS
RIGHT RENAL ULTRASOUND KIDNEY SIZE MEASUREMENT 1: 10.3 CM
RIGHT RENAL ULTRASOUND KIDNEY SIZE MEASUREMENT 2: 10.5 CM
RIGHT RENAL ULTRASOUND KIDNEY SIZE MEASUREMENT 3: 11.1 CM
RIGHT RENAL ULTRASOUND KIDNEY SIZE MEASUREMENT AVERAGE: 11.1 CM
RIGHT RENAL ULTRASOUND RESISTIVE INDEX MEASUREMENT 1: 0.78
RIGHT RENAL ULTRASOUND RESISTIVE INDEX MEASUREMENT 2: 0.69
RIGHT RENAL ULTRASOUND RESISTIVE INDEX MEASUREMENT 3: 0.55
RIGHT RENAL ULTRASOUND RESISTIVE INDEX MEASUREMENT AVERAGE: 0.78

## 2022-12-02 ENCOUNTER — HOSPITAL ENCOUNTER (EMERGENCY)
Facility: HOSPITAL | Age: 47
Discharge: HOME | End: 2022-12-02
Attending: EMERGENCY MEDICINE
Payer: MEDICARE

## 2022-12-02 ENCOUNTER — TELEPHONE (OUTPATIENT)
Dept: ORTHOPEDICS | Facility: CLINIC | Age: 47
End: 2022-12-02
Payer: MEDICARE

## 2022-12-02 VITALS
HEART RATE: 90 BPM | OXYGEN SATURATION: 98 % | TEMPERATURE: 98 F | BODY MASS INDEX: 26.46 KG/M2 | RESPIRATION RATE: 18 BRPM | SYSTOLIC BLOOD PRESSURE: 188 MMHG | WEIGHT: 155 LBS | DIASTOLIC BLOOD PRESSURE: 89 MMHG | HEIGHT: 64 IN

## 2022-12-02 DIAGNOSIS — R52 PAIN: ICD-10-CM

## 2022-12-02 DIAGNOSIS — S70.01XA CONTUSION OF RIGHT HIP, INITIAL ENCOUNTER: ICD-10-CM

## 2022-12-02 DIAGNOSIS — S80.01XA CONTUSION OF RIGHT KNEE, INITIAL ENCOUNTER: Primary | ICD-10-CM

## 2022-12-02 PROCEDURE — 25000003 PHARM REV CODE 250: Performed by: NURSE PRACTITIONER

## 2022-12-02 PROCEDURE — 29505 APPLICATION LONG LEG SPLINT: CPT | Mod: RT

## 2022-12-02 PROCEDURE — 99284 EMERGENCY DEPT VISIT MOD MDM: CPT | Mod: 25

## 2022-12-02 RX ORDER — ACETAMINOPHEN 500 MG
1000 TABLET ORAL
Status: COMPLETED | OUTPATIENT
Start: 2022-12-02 | End: 2022-12-02

## 2022-12-02 RX ADMIN — ACETAMINOPHEN 1000 MG: 500 TABLET ORAL at 11:12
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
## 2022-12-05 ENCOUNTER — TELEPHONE (OUTPATIENT)
Dept: ORTHOPEDICS | Facility: CLINIC | Age: 47
End: 2022-12-05
Payer: MEDICARE

## 2022-12-05 PROBLEM — I63.511 ACUTE ISCHEMIC RIGHT MCA STROKE: Status: RESOLVED | Noted: 2022-09-01 | Resolved: 2022-12-05

## 2022-12-07 ENCOUNTER — PATIENT OUTREACH (OUTPATIENT)
Dept: EMERGENCY MEDICINE | Facility: HOSPITAL | Age: 47
End: 2022-12-07

## 2022-12-15 ENCOUNTER — OFFICE VISIT (OUTPATIENT)
Dept: CARDIOLOGY | Facility: CLINIC | Age: 47
End: 2022-12-15
Payer: MEDICARE

## 2022-12-15 VITALS
HEIGHT: 64 IN | BODY MASS INDEX: 26.46 KG/M2 | SYSTOLIC BLOOD PRESSURE: 121 MMHG | WEIGHT: 155 LBS | HEART RATE: 76 BPM | DIASTOLIC BLOOD PRESSURE: 65 MMHG

## 2022-12-15 DIAGNOSIS — I50.31 ACUTE DIASTOLIC CHF (CONGESTIVE HEART FAILURE): Primary | ICD-10-CM

## 2022-12-15 DIAGNOSIS — Z92.82 S/P ADMN TPA IN DIFF FAC W/N LAST 24 HR BEF ADM TO CRNT FAC: ICD-10-CM

## 2022-12-15 DIAGNOSIS — I10 HYPERTENSION, UNSPECIFIED TYPE: ICD-10-CM

## 2022-12-15 DIAGNOSIS — D64.9 ANEMIA, UNSPECIFIED TYPE: ICD-10-CM

## 2022-12-15 PROCEDURE — 3078F PR MOST RECENT DIASTOLIC BLOOD PRESSURE < 80 MM HG: ICD-10-PCS | Mod: CPTII,S$GLB,, | Performed by: INTERNAL MEDICINE

## 2022-12-15 PROCEDURE — 1159F PR MEDICATION LIST DOCUMENTED IN MEDICAL RECORD: ICD-10-PCS | Mod: CPTII,S$GLB,, | Performed by: INTERNAL MEDICINE

## 2022-12-15 PROCEDURE — 99999 PR PBB SHADOW E&M-EST. PATIENT-LVL II: CPT | Mod: PBBFAC,,, | Performed by: INTERNAL MEDICINE

## 2022-12-15 PROCEDURE — 3051F PR MOST RECENT HEMOGLOBIN A1C LEVEL 7.0 - < 8.0%: ICD-10-PCS | Mod: CPTII,S$GLB,, | Performed by: INTERNAL MEDICINE

## 2022-12-15 PROCEDURE — 3008F PR BODY MASS INDEX (BMI) DOCUMENTED: ICD-10-PCS | Mod: CPTII,S$GLB,, | Performed by: INTERNAL MEDICINE

## 2022-12-15 PROCEDURE — 99999 PR PBB SHADOW E&M-EST. PATIENT-LVL II: ICD-10-PCS | Mod: PBBFAC,,, | Performed by: INTERNAL MEDICINE

## 2022-12-15 PROCEDURE — 99214 OFFICE O/P EST MOD 30 MIN: CPT | Mod: S$GLB,,, | Performed by: INTERNAL MEDICINE

## 2022-12-15 PROCEDURE — 4010F PR ACE/ARB THEARPY RXD/TAKEN: ICD-10-PCS | Mod: CPTII,S$GLB,, | Performed by: INTERNAL MEDICINE

## 2022-12-15 PROCEDURE — 3074F PR MOST RECENT SYSTOLIC BLOOD PRESSURE < 130 MM HG: ICD-10-PCS | Mod: CPTII,S$GLB,, | Performed by: INTERNAL MEDICINE

## 2022-12-15 PROCEDURE — 4010F ACE/ARB THERAPY RXD/TAKEN: CPT | Mod: CPTII,S$GLB,, | Performed by: INTERNAL MEDICINE

## 2022-12-15 PROCEDURE — 3074F SYST BP LT 130 MM HG: CPT | Mod: CPTII,S$GLB,, | Performed by: INTERNAL MEDICINE

## 2022-12-15 PROCEDURE — 3078F DIAST BP <80 MM HG: CPT | Mod: CPTII,S$GLB,, | Performed by: INTERNAL MEDICINE

## 2022-12-15 PROCEDURE — 3008F BODY MASS INDEX DOCD: CPT | Mod: CPTII,S$GLB,, | Performed by: INTERNAL MEDICINE

## 2022-12-15 PROCEDURE — 99214 PR OFFICE/OUTPT VISIT, EST, LEVL IV, 30-39 MIN: ICD-10-PCS | Mod: S$GLB,,, | Performed by: INTERNAL MEDICINE

## 2022-12-15 PROCEDURE — 3051F HG A1C>EQUAL 7.0%<8.0%: CPT | Mod: CPTII,S$GLB,, | Performed by: INTERNAL MEDICINE

## 2022-12-15 PROCEDURE — 1159F MED LIST DOCD IN RCRD: CPT | Mod: CPTII,S$GLB,, | Performed by: INTERNAL MEDICINE

## 2022-12-15 RX ORDER — FUROSEMIDE 40 MG/1
40 TABLET ORAL DAILY
Qty: 30 TABLET | Refills: 2 | Status: SHIPPED | OUTPATIENT
Start: 2022-12-15 | End: 2023-03-21

## 2022-12-15 NOTE — PROGRESS NOTES
Subjective:    Patient ID:  Mayra Davis is a 47 y.o. female patient here for evaluation Follow-up      History of Present Illness:  Follow-up hypertensive cardiovascular disease.  Screening renal artery ultrasound negative for stenosis.  Past history CVA, September of this year, treated with tPA.  Imaging studies were negative for infarct neurologic abnormalities.  No neurologic sequelae.     Last visit added Loniten 2.5 b.i.d. blood pressure well controlled.  Patient additionally on hydralazine 100 t.i.d., Coreg 25 mg 1-1/2 tablets b.i.d..  Patient no longer on Diovan or amlodipine.  Complains of fluid retention.  Currently out of Lasix.    No chest pain.  No palpitations.  No syncope/presyncope.  No definite edema.  Positive weight gain.    Review of patient's allergies indicates:   Allergen Reactions    Vancomycin analogues Rash       Past Medical History:   Diagnosis Date    Diabetes mellitus     GERD (gastroesophageal reflux disease)     Hypertension     Legally blind      No past surgical history on file.  Social History     Tobacco Use    Smoking status: Never    Smokeless tobacco: Never        Review of Systems:    As noted in HPI in addition      REVIEW OF SYSTEMS  Review of Systems   Constitutional: Negative for decreased appetite, diaphoresis, night sweats, weight gain and weight loss.   HENT:  Negative for nosebleeds and odynophagia.    Eyes:  Negative for double vision and photophobia.   Cardiovascular:  Negative for chest pain, claudication, cyanosis, dyspnea on exertion, irregular heartbeat, leg swelling, near-syncope, orthopnea, palpitations, paroxysmal nocturnal dyspnea and syncope.   Respiratory:  Negative for cough, hemoptysis, shortness of breath and wheezing.    Hematologic/Lymphatic: Negative for adenopathy.   Skin:  Negative for flushing, skin cancer and suspicious lesions.   Musculoskeletal:  Negative for gout, myalgias and neck pain.   Gastrointestinal:  Negative for abdominal pain,  heartburn, hematemesis and hematochezia.   Genitourinary:  Negative for bladder incontinence, hesitancy and nocturia.   Neurological:  Negative for focal weakness, headaches, light-headedness and paresthesias.   Psychiatric/Behavioral:  Negative for memory loss and substance abuse.             Objective:        Vitals:    12/15/22 0914   BP: 121/65   Pulse: 76       Lab Results   Component Value Date    WBC 7.54 09/04/2022    HGB 10.0 (L) 09/04/2022    HCT 30.7 (L) 09/04/2022     09/04/2022    CHOL 155 08/31/2022    TRIG 154 (H) 08/31/2022    HDL 77 (H) 08/31/2022    ALT 17 08/31/2022    AST 17 08/31/2022     (L) 09/04/2022    K 3.7 09/04/2022     09/04/2022    CREATININE 0.9 09/04/2022    BUN 19 09/04/2022    CO2 26 09/04/2022    TSH 2.628 08/31/2022    INR 1.0 08/31/2022    HGBA1C 7.1 (H) 09/01/2022        ECHOCARDIOGRAM RESULTS  Results for orders placed during the hospital encounter of 09/01/22    Echo Saline Bubble? Yes    Interpretation Summary  · The left ventricle is normal in size with moderate concentric hypertrophy and normal systolic function.  · The estimated ejection fraction is 65%.  · Normal left ventricular diastolic function.  · Normal right ventricular size with normal right ventricular systolic function.  · No interatrial septal defect present.  · Mild tricuspid regurgitation.        CURRENT/PREVIOUS VISIT EKG  Results for orders placed or performed during the hospital encounter of 08/31/22   EKG 12-lead    Collection Time: 08/31/22  3:55 PM    Narrative    Test Reason : R07.89,    Vent. Rate : 080 BPM     Atrial Rate : 080 BPM     P-R Int : 180 ms          QRS Dur : 078 ms      QT Int : 382 ms       P-R-T Axes : 055 -07 065 degrees     QTc Int : 440 ms    Normal sinus rhythm  Anterior infarct (cited on or before 16-MAY-2022)  Abnormal ECG  When compared with ECG of 16-MAY-2022 16:41,  No significant change was found  Confirmed by Brad De León MD ([id number]) on 9/5/2022 5:38:58  PM    Referred By: IGNACIO   SELF           Confirmed By:Brad De León MD     No valid procedures specified.   Results for orders placed during the hospital encounter of 05/16/22    Nuclear Stress Test    Interpretation Summary    The EKG portion of this study is negative for ischemia.    There were no arrhythmias during stress.    No valid procedures specified.    PHYSICAL EXAM  CONSTITUTIONAL: Well built, well nourished in no apparent distress  NECK: no carotid bruit, no JVD  LUNGS: CTA  CHEST WALL: no tenderness,  HEART: regular rate and rhythm, S1, S2 normal, no murmur, click, rub or gallop   ABDOMEN: soft, non-tender; bowel sounds normal; no masses,  no organomegaly  EXTREMITIES: Extremities normal, no edema, no calf tenderness noted  NEURO: AAO X 3    I HAVE REVIEWED :    The vital signs, nurses notes, and all the pertinent radiology and labs.         Current Outpatient Medications   Medication Instructions    ALPRAZolam (XANAX) 1 mg, Oral, 3 times daily PRN    amLODIPine (NORVASC) 5 mg, Oral, Daily    aspirin 81 mg, Oral, Daily    atorvastatin (LIPITOR) 20 mg, Oral, Daily    busPIRone (BUSPAR) 7.5 mg, Oral, 3 times daily    carvediloL (COREG) 37.5 mg, Oral, 2 times daily    clopidogreL (PLAVIX) 75 mg, Oral, Daily    COMBIGAN 0.2-0.5 % Drop Both Eyes    furosemide (LASIX) 40 mg, Oral, Daily    furosemide (LASIX) 20 mg, Oral, Every morning    glipiZIDE (GLUCOTROL) 10 mg, Oral, 2 times daily    hydrALAZINE (APRESOLINE) 100 mg, Oral, Every 8 hours    hydroCHLOROthiazide (HYDRODIURIL) 12.5 mg, Oral, Daily    JANUMET XR  mg TM24 1 tablet, Oral, Daily    lamoTRIgine 50 mg TR24 1 tablet, Oral, Daily    metFORMIN (GLUCOPHAGE) 1,000 mg, Oral, 2 times daily with meals    minoxidiL (LONITEN) 2.5 mg, Oral, 2 times daily    omeprazole (PRILOSEC) 20 mg, Oral, Daily    OXcarbazepine (TRILEPTAL) 150 mg, Oral, 2 times daily    pantoprazole (PROTONIX) 40 mg, Oral, Daily    potassium chloride (MICRO-K) 10 MEQ CpSR 10  mEq, Oral, Daily    prednisoLONE acetate (PRED FORTE) 1 % DrpS 1 drop, Both Eyes, 4 times daily    QUEtiapine (SEROQUEL) 25 mg, Oral, Nightly    TRESIBA FLEXTOUCH U-100 100 unit/mL (3 mL) insulin pen Subcutaneous    valsartan (DIOVAN) 160 mg, Oral, 2 times daily    VITAMIN D3 1,000 Units, Oral, Daily    zolpidem (AMBIEN) 10 mg, Oral, Nightly PRN          Assessment:   Hypertensive cardiovascular disease.  Controlled with current medical regime.  History of CVA treated with tPA, imaging studies negative.  No neurologic sequelae.  Diabetes mellitus, dyslipidemia.  Severe diabetic retinopathy.      Plan:   Blood pressure currently controlled with current medical regime.  Renal artery ultrasound negative for stenosis.  Labs and follow-up primary care with management of diabetes per primary care.  Meds reviewed and reconciled.  No change current regime.  Represcribed Lasix 40, would use as needed or at most 3 times per week.          No follow-ups on file.

## 2023-01-03 ENCOUNTER — OFFICE VISIT (OUTPATIENT)
Dept: ALLERGY | Facility: CLINIC | Age: 48
End: 2023-01-03
Payer: MEDICARE

## 2023-01-03 VITALS
TEMPERATURE: 98 F | SYSTOLIC BLOOD PRESSURE: 122 MMHG | BODY MASS INDEX: 28.77 KG/M2 | WEIGHT: 167.63 LBS | DIASTOLIC BLOOD PRESSURE: 66 MMHG

## 2023-01-03 DIAGNOSIS — L30.9 DERMATITIS: Primary | ICD-10-CM

## 2023-01-03 DIAGNOSIS — L29.9 PRURITUS: Primary | ICD-10-CM

## 2023-01-03 DIAGNOSIS — L30.9 DERMATITIS: ICD-10-CM

## 2023-01-03 DIAGNOSIS — L29.9 PRURITUS: ICD-10-CM

## 2023-01-03 PROCEDURE — 1159F PR MEDICATION LIST DOCUMENTED IN MEDICAL RECORD: ICD-10-PCS | Mod: CPTII,S$GLB,, | Performed by: ALLERGY & IMMUNOLOGY

## 2023-01-03 PROCEDURE — 3008F PR BODY MASS INDEX (BMI) DOCUMENTED: ICD-10-PCS | Mod: CPTII,S$GLB,, | Performed by: ALLERGY & IMMUNOLOGY

## 2023-01-03 PROCEDURE — 1160F RVW MEDS BY RX/DR IN RCRD: CPT | Mod: CPTII,S$GLB,, | Performed by: ALLERGY & IMMUNOLOGY

## 2023-01-03 PROCEDURE — 99204 OFFICE O/P NEW MOD 45 MIN: CPT | Mod: S$GLB,,, | Performed by: ALLERGY & IMMUNOLOGY

## 2023-01-03 PROCEDURE — 99204 PR OFFICE/OUTPT VISIT, NEW, LEVL IV, 45-59 MIN: ICD-10-PCS | Mod: S$GLB,,, | Performed by: ALLERGY & IMMUNOLOGY

## 2023-01-03 PROCEDURE — 3008F BODY MASS INDEX DOCD: CPT | Mod: CPTII,S$GLB,, | Performed by: ALLERGY & IMMUNOLOGY

## 2023-01-03 PROCEDURE — 1160F PR REVIEW ALL MEDS BY PRESCRIBER/CLIN PHARMACIST DOCUMENTED: ICD-10-PCS | Mod: CPTII,S$GLB,, | Performed by: ALLERGY & IMMUNOLOGY

## 2023-01-03 PROCEDURE — 3078F PR MOST RECENT DIASTOLIC BLOOD PRESSURE < 80 MM HG: ICD-10-PCS | Mod: CPTII,S$GLB,, | Performed by: ALLERGY & IMMUNOLOGY

## 2023-01-03 PROCEDURE — 3074F SYST BP LT 130 MM HG: CPT | Mod: CPTII,S$GLB,, | Performed by: ALLERGY & IMMUNOLOGY

## 2023-01-03 PROCEDURE — 3078F DIAST BP <80 MM HG: CPT | Mod: CPTII,S$GLB,, | Performed by: ALLERGY & IMMUNOLOGY

## 2023-01-03 PROCEDURE — 1159F MED LIST DOCD IN RCRD: CPT | Mod: CPTII,S$GLB,, | Performed by: ALLERGY & IMMUNOLOGY

## 2023-01-03 PROCEDURE — 3074F PR MOST RECENT SYSTOLIC BLOOD PRESSURE < 130 MM HG: ICD-10-PCS | Mod: CPTII,S$GLB,, | Performed by: ALLERGY & IMMUNOLOGY

## 2023-01-03 RX ORDER — DOXEPIN HYDROCHLORIDE 50 MG/G
CREAM TOPICAL 4 TIMES DAILY
Qty: 45 G | Refills: 3 | Status: SHIPPED | OUTPATIENT
Start: 2023-01-03 | End: 2023-11-06 | Stop reason: SDUPTHER

## 2023-01-03 RX ORDER — CLOBETASOL PROPIONATE 0.05 G/100ML
SHAMPOO TOPICAL DAILY
Qty: 118 ML | Refills: 3 | Status: SHIPPED | OUTPATIENT
Start: 2023-01-03 | End: 2023-05-16 | Stop reason: SDUPTHER

## 2023-01-03 RX ORDER — MOMETASONE FUROATE 1 MG/G
CREAM TOPICAL DAILY
Qty: 45 G | Refills: 1 | Status: SHIPPED | OUTPATIENT
Start: 2023-01-03

## 2023-01-03 RX ORDER — LORATADINE 10 MG/1
10 TABLET ORAL 4 TIMES DAILY
Qty: 120 TABLET | Refills: 1 | Status: SHIPPED | OUTPATIENT
Start: 2023-01-03 | End: 2023-05-16 | Stop reason: SDUPTHER

## 2023-01-03 RX ORDER — MOMETASONE FUROATE 1 MG/ML
SOLUTION TOPICAL DAILY
Qty: 60 ML | Refills: 1 | Status: SHIPPED | OUTPATIENT
Start: 2023-01-03 | End: 2023-02-20

## 2023-01-03 NOTE — TELEPHONE ENCOUNTER
Clobetasol shampoo, and doxepin cream not covered by insurance per [facility]. Preferred alternatives pended

## 2023-01-03 NOTE — PATIENT INSTRUCTIONS
Clobetasol shampoo- apply daily , leave in 10 mins then rinse out.     Have dermatology biopsy and patch test     Start taking claritin 10 mg tablets 1 tablet every 6 hours.   May take benadryl in addition if needed for breakthrough itching.     Doxepin cream as needed twice per day for itching.     Follow up in 6 weeks , labs at [facility].

## 2023-01-03 NOTE — PROGRESS NOTES
"Subjective:       Patient ID: Mayra Davis is a 47 y.o. female.    Chief Complaint: Rash (Rash on scalp and face. Not taking any medicines or creams for rash. /When she uses product, like fragrant shampoos and lotions or soaps she breaks out really bad.)    HPI    Pt presents  As a new patient     For rash    Started 2 years ago   Thought it was due to the water in [place]  Then moved to [place] and no change     Pt states she has scalp sores.   Wears a wig   And when tucks wig behind her ears, she has "sores"  Wig is made of human hair and synthetics as well.     If she bathes with fragrant soaps or shampoos and conditioners.     Pt states if she puts alcohol, witch hazel her face will have "bumps" as well.   She can only use water.     She states her skin has not been like this prior to the start of the rash  Lip gloss can also make her lips break out.     If she uses powder - on her face - concealer - nars double wear she will have facial burning.   Even with rinsing the makeup off, she will still have burning.     She also uses eye drops and will have scratching around her nose and lips.     Benadryl will help.     Pt states lotions will also make her itch. Coconut oil will make her itch.       No history of eczema when she was younger.     If she puts hydrocortisone on her face, it will also itch.     Pt has also tried noxema- but this burns. She states her skin looks like it is "burnt"  Then her skin peels and she will "pick at it" and sores will form.     50 mg of benadryl seems to help in about 1 hour. - doesn't make her drowsy or sedated.     Pt states however, she can NOT put on any products and still having itching on the scalp and face.     Pt has a strong family history of autoimmunity. Has not been tested her self.     PMH  Stroke  HTN  Blindness    Fhx  Mgma, pgma - RA  Puncle - Lupus  Maunt- lupus     Component      Latest Ref Rng & Units 8/31/2022   TSH      0.400 - 4.000 uIU/mL 2.628 "       Review of Systems    General: neg unexpected weight changes, fevers, chills, night sweats, malaise  HEENT: see hpi, Neg eye pain, vision changes, ear drainage, nose bleeds, throat tightness, sores in the mouth  CV: Neg chest pain, palpitations, swelling  Resp: see hpi, neg shortness of breath, hemoptysis, cough  GI: see hpi, neg dysphagia, night abdominal pain, reflux, chronic diarrhea, chronic constipation  Derm: See Hpi, neg new rash, neg flushing  Mu/sk: Neg joint pain, joint swelling   Psych: Neg anxiety  neuro: neg chronic headaches, muscle weakness  Endo: neg heat/cold intolerance, chronic fatigue    Objective:     Vitals:    01/03/23 0840   BP: 122/66   Temp: 98.2 °F (36.8 °C)   Weight: 76 kg (167 lb 9.6 oz)        Physical Exam    General: no acute distress, well developed well nourished   Skin: purpuric lesions with blurred borders circumscribed. ? Discoid lupus vs other purigo ?     Assessment:       1. Pruritus    2. Dermatitis        Plan:       Pruritus  -     TSH; Future; Expected date: 01/03/2023  -     IRENE Screen w/Reflex; Future; Expected date: 01/03/2023  -     C-reactive protein; Future; Expected date: 01/03/2023  -     Protein Electrophoresis, Serum; Future; Expected date: 01/03/2023  -     loratadine (CLARITIN) 10 mg tablet; Take 1 tablet (10 mg total) by mouth 4 (four) times daily.  Dispense: 120 tablet; Refill: 1  -     clobetasoL (CLOBEX) 0.05 % shampoo; Apply topically once daily.  Dispense: 118 mL; Refill: 3  -     doxepin (ZONALON) 5 % cream; Apply topically 4 (four) times daily.  Dispense: 45 g; Refill: 3    Dermatitis  -     TSH; Future; Expected date: 01/03/2023  -     IRENE Screen w/Reflex; Future; Expected date: 01/03/2023  -     C-reactive protein; Future; Expected date: 01/03/2023  -     Protein Electrophoresis, Serum; Future; Expected date: 01/03/2023  -     loratadine (CLARITIN) 10 mg tablet; Take 1 tablet (10 mg total) by mouth 4 (four) times daily.  Dispense: 120 tablet;  Refill: 1  -     clobetasoL (CLOBEX) 0.05 % shampoo; Apply topically once daily.  Dispense: 118 mL; Refill: 3  -     doxepin (ZONALON) 5 % cream; Apply topically 4 (four) times daily.  Dispense: 45 g; Refill: 3              Purpuric lesions  1/23:  Dermatology to biopsy   Consider discoid lupus vs purigo nodularis vs other   Smh labs non fasting      Start clobetasol shampoo if tolerated    Rec patch testing to cosmetic     Take antihistamines qid prn claritin    Start doxepin cream bid prn     Follow up 6 weeks, sooner if needed.       Deborah Collins M.D.  Allergy/Immunology  [facility]   [phone] phone  [phone] fax

## 2023-01-03 NOTE — LETTER
January 3, 2023        Teresita Darden MD  [address] [address]             [facility] - Allergy  [address]  [address]  [facility] [address]  Phone: [phone]  Fax: [phone]   Patient: Mayra Davis   MR Number: [id number]   YOB: 1975   Date of Visit: 1/3/2023       Dear Dr. Darden:    Thank you for referring Mayra Davis to me for evaluation. Below are the relevant portions of my assessment and plan of care.            If you have questions, please do not hesitate to call me. I look forward to following Mayra along with you.    Sincerely,      Deborah Collins MD           CC  No Recipients

## 2023-02-08 ENCOUNTER — HOSPITAL ENCOUNTER (OUTPATIENT)
Dept: RADIOLOGY | Facility: HOSPITAL | Age: 48
Discharge: HOME | End: 2023-02-08
Attending: ORTHOPAEDIC SURGERY
Payer: MEDICARE

## 2023-02-08 ENCOUNTER — OFFICE VISIT (OUTPATIENT)
Dept: ORTHOPEDICS | Facility: CLINIC | Age: 48
End: 2023-02-08
Payer: MEDICARE

## 2023-02-08 VITALS — WEIGHT: 167 LBS | HEIGHT: 64 IN | BODY MASS INDEX: 28.51 KG/M2

## 2023-02-08 DIAGNOSIS — M25.561 ACUTE PAIN OF RIGHT KNEE: ICD-10-CM

## 2023-02-08 DIAGNOSIS — M25.561 ACUTE PAIN OF RIGHT KNEE: Primary | ICD-10-CM

## 2023-02-08 PROCEDURE — 99203 OFFICE O/P NEW LOW 30 MIN: CPT | Mod: S$GLB,,, | Performed by: ORTHOPAEDIC SURGERY

## 2023-02-08 PROCEDURE — 97760 ORTHOTIC MGMT&TRAING 1ST ENC: CPT | Mod: GP,S$GLB,, | Performed by: ORTHOPAEDIC SURGERY

## 2023-02-08 PROCEDURE — 99203 PR OFFICE/OUTPT VISIT, NEW, LEVL III, 30-44 MIN: ICD-10-PCS | Mod: S$GLB,,, | Performed by: ORTHOPAEDIC SURGERY

## 2023-02-08 PROCEDURE — 3008F PR BODY MASS INDEX (BMI) DOCUMENTED: ICD-10-PCS | Mod: CPTII,S$GLB,, | Performed by: ORTHOPAEDIC SURGERY

## 2023-02-08 PROCEDURE — 1159F MED LIST DOCD IN RCRD: CPT | Mod: CPTII,S$GLB,, | Performed by: ORTHOPAEDIC SURGERY

## 2023-02-08 PROCEDURE — 99999 PR PBB SHADOW E&M-EST. PATIENT-LVL IV: CPT | Mod: PBBFAC,,, | Performed by: ORTHOPAEDIC SURGERY

## 2023-02-08 PROCEDURE — 1160F RVW MEDS BY RX/DR IN RCRD: CPT | Mod: CPTII,S$GLB,, | Performed by: ORTHOPAEDIC SURGERY

## 2023-02-08 PROCEDURE — 3008F BODY MASS INDEX DOCD: CPT | Mod: CPTII,S$GLB,, | Performed by: ORTHOPAEDIC SURGERY

## 2023-02-08 PROCEDURE — 1160F PR REVIEW ALL MEDS BY PRESCRIBER/CLIN PHARMACIST DOCUMENTED: ICD-10-PCS | Mod: CPTII,S$GLB,, | Performed by: ORTHOPAEDIC SURGERY

## 2023-02-08 PROCEDURE — 73560 X-RAY EXAM OF KNEE 1 OR 2: CPT | Mod: 26,RT,, | Performed by: RADIOLOGY

## 2023-02-08 PROCEDURE — 1159F PR MEDICATION LIST DOCUMENTED IN MEDICAL RECORD: ICD-10-PCS | Mod: CPTII,S$GLB,, | Performed by: ORTHOPAEDIC SURGERY

## 2023-02-08 PROCEDURE — 99999 PR PBB SHADOW E&M-EST. PATIENT-LVL IV: ICD-10-PCS | Mod: PBBFAC,,, | Performed by: ORTHOPAEDIC SURGERY

## 2023-02-08 PROCEDURE — 73560 X-RAY EXAM OF KNEE 1 OR 2: CPT | Mod: TC,PO,RT

## 2023-02-08 PROCEDURE — 73560 XR KNEE 1 OR 2 VIEW RIGHT: ICD-10-PCS | Mod: 26,RT,, | Performed by: RADIOLOGY

## 2023-02-08 PROCEDURE — 97760 PR ORTHOTIC MGMT&TRAINJ INITIAL ENC EA 15 MINS: ICD-10-PCS | Mod: GP,S$GLB,, | Performed by: ORTHOPAEDIC SURGERY
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
## 2023-02-14 ENCOUNTER — LAB VISIT (OUTPATIENT)
Dept: LAB | Facility: HOSPITAL | Age: 48
End: 2023-02-14
Attending: ALLERGY & IMMUNOLOGY
Payer: MEDICARE

## 2023-02-14 ENCOUNTER — OFFICE VISIT (OUTPATIENT)
Dept: ALLERGY | Facility: CLINIC | Age: 48
End: 2023-02-14
Payer: MEDICARE

## 2023-02-14 VITALS
DIASTOLIC BLOOD PRESSURE: 79 MMHG | OXYGEN SATURATION: 99 % | TEMPERATURE: 97 F | WEIGHT: 175 LBS | HEART RATE: 83 BPM | SYSTOLIC BLOOD PRESSURE: 170 MMHG | BODY MASS INDEX: 30.04 KG/M2

## 2023-02-14 DIAGNOSIS — L29.9 PRURITUS: ICD-10-CM

## 2023-02-14 DIAGNOSIS — R03.0 ELEVATED BLOOD PRESSURE READING: ICD-10-CM

## 2023-02-14 DIAGNOSIS — L29.9 PRURITUS: Primary | ICD-10-CM

## 2023-02-14 DIAGNOSIS — L30.9 DERMATITIS: ICD-10-CM

## 2023-02-14 LAB
CRP SERPL-MCNC: 0.33 MG/DL
TSH SERPL DL<=0.005 MIU/L-ACNC: 3.11 UIU/ML (ref 0.34–5.6)

## 2023-02-14 PROCEDURE — 86140 C-REACTIVE PROTEIN: CPT | Performed by: ALLERGY & IMMUNOLOGY

## 2023-02-14 PROCEDURE — 1159F MED LIST DOCD IN RCRD: CPT | Mod: CPTII,S$GLB,, | Performed by: ALLERGY & IMMUNOLOGY

## 2023-02-14 PROCEDURE — 3077F SYST BP >= 140 MM HG: CPT | Mod: CPTII,S$GLB,, | Performed by: ALLERGY & IMMUNOLOGY

## 2023-02-14 PROCEDURE — 3078F DIAST BP <80 MM HG: CPT | Mod: CPTII,S$GLB,, | Performed by: ALLERGY & IMMUNOLOGY

## 2023-02-14 PROCEDURE — 36415 COLL VENOUS BLD VENIPUNCTURE: CPT | Performed by: ALLERGY & IMMUNOLOGY

## 2023-02-14 PROCEDURE — 84165 PROTEIN E-PHORESIS SERUM: CPT | Performed by: ALLERGY & IMMUNOLOGY

## 2023-02-14 PROCEDURE — 1159F PR MEDICATION LIST DOCUMENTED IN MEDICAL RECORD: ICD-10-PCS | Mod: CPTII,S$GLB,, | Performed by: ALLERGY & IMMUNOLOGY

## 2023-02-14 PROCEDURE — 3008F PR BODY MASS INDEX (BMI) DOCUMENTED: ICD-10-PCS | Mod: CPTII,S$GLB,, | Performed by: ALLERGY & IMMUNOLOGY

## 2023-02-14 PROCEDURE — 3077F PR MOST RECENT SYSTOLIC BLOOD PRESSURE >= 140 MM HG: ICD-10-PCS | Mod: CPTII,S$GLB,, | Performed by: ALLERGY & IMMUNOLOGY

## 2023-02-14 PROCEDURE — 99214 PR OFFICE/OUTPT VISIT, EST, LEVL IV, 30-39 MIN: ICD-10-PCS | Mod: S$GLB,,, | Performed by: ALLERGY & IMMUNOLOGY

## 2023-02-14 PROCEDURE — 84443 ASSAY THYROID STIM HORMONE: CPT | Performed by: ALLERGY & IMMUNOLOGY

## 2023-02-14 PROCEDURE — 99214 OFFICE O/P EST MOD 30 MIN: CPT | Mod: S$GLB,,, | Performed by: ALLERGY & IMMUNOLOGY

## 2023-02-14 PROCEDURE — 86038 ANTINUCLEAR ANTIBODIES: CPT | Performed by: ALLERGY & IMMUNOLOGY

## 2023-02-14 PROCEDURE — 3078F PR MOST RECENT DIASTOLIC BLOOD PRESSURE < 80 MM HG: ICD-10-PCS | Mod: CPTII,S$GLB,, | Performed by: ALLERGY & IMMUNOLOGY

## 2023-02-14 PROCEDURE — 84155 ASSAY OF PROTEIN SERUM: CPT | Performed by: ALLERGY & IMMUNOLOGY

## 2023-02-14 PROCEDURE — 3008F BODY MASS INDEX DOCD: CPT | Mod: CPTII,S$GLB,, | Performed by: ALLERGY & IMMUNOLOGY

## 2023-02-14 RX ORDER — PEN NEEDLE, DIABETIC 32 GX 1/4"
NEEDLE, DISPOSABLE MISCELLANEOUS
COMMUNITY
Start: 2022-12-12

## 2023-02-14 RX ORDER — TETRACYCLINE HYDROCHLORIDE 500 MG/1
500 CAPSULE ORAL 4 TIMES DAILY
COMMUNITY
Start: 2022-12-16

## 2023-02-14 RX ORDER — METRONIDAZOLE 250 MG/1
250 TABLET ORAL 4 TIMES DAILY
COMMUNITY
Start: 2022-12-16

## 2023-02-14 RX ORDER — SUCRALFATE 1 G/1
TABLET ORAL
COMMUNITY
Start: 2023-01-29

## 2023-02-14 RX ORDER — BISMUTH SUBSALICYLATE 262 MG/1
2 TABLET ORAL 4 TIMES DAILY
COMMUNITY
Start: 2022-12-16

## 2023-02-14 RX ORDER — CLONIDINE HYDROCHLORIDE 0.1 MG/1
0.1 TABLET ORAL 2 TIMES DAILY
COMMUNITY
Start: 2023-01-02 | End: 2023-05-29 | Stop reason: ALTCHOICE

## 2023-02-14 RX ORDER — DOXEPIN HYDROCHLORIDE 10 MG/1
10 CAPSULE ORAL NIGHTLY PRN
Qty: 30 CAPSULE | Refills: 1 | Status: SHIPPED | OUTPATIENT
Start: 2023-02-14 | End: 2023-03-08

## 2023-02-14 NOTE — PATIENT INSTRUCTIONS
"Have Dr. Weil perform a biopsy on the areas.     Ask him to patch test you as well.     You may start an "at home" patch test called a repeat open application test    Start by applying a cosmetic like shampoo or conditioner on the "inner elbow"  Apply twice per day x 2 weeks   Make sure it sees the sun.     If there is increased redness or itching, an ingredient is causing an issue.     If there is redness and itching, apply clobetasol and take a loratadine.     You can take a loratadine four times per day for non sedative itching. 10 mg tablets.     The pharmacy has this on hold for you at $20.74.      Doxepin cream as needed as often as required for itching is recommended.    Continue clobtesol shampoo for scalp.     Will call you with lab results.       "

## 2023-02-15 LAB
ALBUMIN SERPL ELPH-MCNC: 2.7 G/DL (ref 2.9–4.4)
ALBUMIN/GLOB SERPL: 0.9 {RATIO} (ref 0.7–1.7)
ALPHA1 GLOB SERPL ELPH-MCNC: 0.3 G/DL (ref 0–0.4)
ALPHA2 GLOB SERPL ELPH-MCNC: 0.8 G/DL (ref 0.4–1)
ANA TITR SER IF: NEGATIVE {TITER}
B-GLOBULIN SERPL ELPH-MCNC: 0.9 G/DL (ref 0.7–1.3)
GAMMA GLOB SERPL ELPH-MCNC: 1 G/DL (ref 0.4–1.8)
GLOBULIN SER CALC-MCNC: 3.1 G/DL (ref 2.2–3.9)
LABORATORY COMMENT REPORT: ABNORMAL
M PROTEIN SERPL ELPH-MCNC: ABNORMAL G/DL
PROT SERPL-MCNC: 5.8 G/DL (ref 6–8.5)

## 2023-03-08 DIAGNOSIS — L30.9 DERMATITIS: ICD-10-CM

## 2023-03-08 DIAGNOSIS — L29.9 PRURITUS: ICD-10-CM

## 2023-03-08 RX ORDER — DOXEPIN HYDROCHLORIDE 10 MG/1
CAPSULE ORAL
Qty: 30 CAPSULE | Refills: 1 | Status: SHIPPED | OUTPATIENT
Start: 2023-03-08 | End: 2023-04-11

## 2023-03-20 ENCOUNTER — LAB VISIT (OUTPATIENT)
Dept: LAB | Facility: HOSPITAL | Age: 48
End: 2023-03-20
Attending: PSYCHIATRY & NEUROLOGY
Payer: MEDICARE

## 2023-03-20 DIAGNOSIS — Z79.899 ENCOUNTER FOR LONG-TERM (CURRENT) USE OF OTHER MEDICATIONS: ICD-10-CM

## 2023-03-20 DIAGNOSIS — E78.49 FAMILIAL COMBINED HYPERLIPIDEMIA: Primary | ICD-10-CM

## 2023-03-20 DIAGNOSIS — E55.9 AVITAMINOSIS D: ICD-10-CM

## 2023-03-20 PROCEDURE — 82607 VITAMIN B-12: CPT | Performed by: PSYCHIATRY & NEUROLOGY

## 2023-03-20 PROCEDURE — 85027 COMPLETE CBC AUTOMATED: CPT | Performed by: PSYCHIATRY & NEUROLOGY

## 2023-03-20 PROCEDURE — 82746 ASSAY OF FOLIC ACID SERUM: CPT | Performed by: PSYCHIATRY & NEUROLOGY

## 2023-03-20 PROCEDURE — 86038 ANTINUCLEAR ANTIBODIES: CPT | Performed by: PSYCHIATRY & NEUROLOGY

## 2023-03-20 PROCEDURE — 36415 COLL VENOUS BLD VENIPUNCTURE: CPT | Performed by: PSYCHIATRY & NEUROLOGY

## 2023-03-20 PROCEDURE — 83735 ASSAY OF MAGNESIUM: CPT | Performed by: PSYCHIATRY & NEUROLOGY

## 2023-03-20 PROCEDURE — 82306 VITAMIN D 25 HYDROXY: CPT | Performed by: PSYCHIATRY & NEUROLOGY

## 2023-03-20 PROCEDURE — 84443 ASSAY THYROID STIM HORMONE: CPT | Performed by: PSYCHIATRY & NEUROLOGY

## 2023-03-20 PROCEDURE — 80053 COMPREHEN METABOLIC PANEL: CPT | Performed by: PSYCHIATRY & NEUROLOGY

## 2023-03-21 LAB
25(OH)D3+25(OH)D2 SERPL-MCNC: 20 NG/ML (ref 30–96)
ALBUMIN SERPL BCP-MCNC: 3.3 G/DL (ref 3.5–5.2)
ALP SERPL-CCNC: 71 U/L (ref 55–135)
ALT SERPL W/O P-5'-P-CCNC: 17 U/L (ref 10–44)
ANION GAP SERPL CALC-SCNC: 5 MMOL/L (ref 8–16)
AST SERPL-CCNC: 22 U/L (ref 10–40)
BILIRUB SERPL-MCNC: 0.5 MG/DL (ref 0.1–1)
BUN SERPL-MCNC: 16 MG/DL (ref 6–20)
CALCIUM SERPL-MCNC: 8.9 MG/DL (ref 8.7–10.5)
CHLORIDE SERPL-SCNC: 106 MMOL/L (ref 95–110)
CO2 SERPL-SCNC: 28 MMOL/L (ref 23–29)
CREAT SERPL-MCNC: 1 MG/DL (ref 0.5–1.4)
ERYTHROCYTE [DISTWIDTH] IN BLOOD BY AUTOMATED COUNT: 12.1 % (ref 11.5–14.5)
EST. GFR  (NO RACE VARIABLE): >60 ML/MIN/1.73 M^2
FOLATE SERPL-MCNC: 11.3 NG/ML (ref 4–24)
GLUCOSE SERPL-MCNC: 194 MG/DL (ref 70–110)
HCT VFR BLD AUTO: 38.3 % (ref 37–48.5)
HGB BLD-MCNC: 12.3 G/DL (ref 12–16)
MAGNESIUM SERPL-MCNC: 2 MG/DL (ref 1.6–2.6)
MCH RBC QN AUTO: 29.6 PG (ref 27–31)
MCHC RBC AUTO-ENTMCNC: 32.1 G/DL (ref 32–36)
MCV RBC AUTO: 92 FL (ref 82–98)
PLATELET # BLD AUTO: 286 K/UL (ref 150–450)
PMV BLD AUTO: 10.7 FL (ref 9.2–12.9)
POTASSIUM SERPL-SCNC: 4.3 MMOL/L (ref 3.5–5.1)
PROT SERPL-MCNC: 6.5 G/DL (ref 6–8.4)
RBC # BLD AUTO: 4.15 M/UL (ref 4–5.4)
SODIUM SERPL-SCNC: 139 MMOL/L (ref 136–145)
TSH SERPL DL<=0.005 MIU/L-ACNC: 3.74 UIU/ML (ref 0.34–5.6)
VIT B12 SERPL-MCNC: 522 PG/ML (ref 210–950)
WBC # BLD AUTO: 5.41 K/UL (ref 3.9–12.7)

## 2023-03-22 LAB — ANA SER-ACNC: NEGATIVE

## 2023-04-12 DIAGNOSIS — M25.561 ACUTE PAIN OF RIGHT KNEE: Primary | ICD-10-CM

## 2023-04-17 ENCOUNTER — HOSPITAL ENCOUNTER (OUTPATIENT)
Dept: RADIOLOGY | Facility: HOSPITAL | Age: 48
Discharge: HOME | End: 2023-04-17
Attending: ORTHOPAEDIC SURGERY
Payer: MEDICARE

## 2023-04-17 ENCOUNTER — OFFICE VISIT (OUTPATIENT)
Dept: ORTHOPEDICS | Facility: CLINIC | Age: 48
End: 2023-04-17
Payer: MEDICARE

## 2023-04-17 VITALS — BODY MASS INDEX: 29.88 KG/M2 | HEIGHT: 64 IN | WEIGHT: 175 LBS

## 2023-04-17 DIAGNOSIS — M17.10 ARTHRITIS OF KNEE: Primary | ICD-10-CM

## 2023-04-17 DIAGNOSIS — M25.561 ACUTE PAIN OF RIGHT KNEE: ICD-10-CM

## 2023-04-17 PROCEDURE — 99213 PR OFFICE/OUTPT VISIT, EST, LEVL III, 20-29 MIN: ICD-10-PCS | Mod: S$GLB,,, | Performed by: ORTHOPAEDIC SURGERY

## 2023-04-17 PROCEDURE — 73560 X-RAY EXAM OF KNEE 1 OR 2: CPT | Mod: TC,59,PO,LT

## 2023-04-17 PROCEDURE — 3008F PR BODY MASS INDEX (BMI) DOCUMENTED: ICD-10-PCS | Mod: CPTII,S$GLB,, | Performed by: ORTHOPAEDIC SURGERY

## 2023-04-17 PROCEDURE — 1159F MED LIST DOCD IN RCRD: CPT | Mod: CPTII,S$GLB,, | Performed by: ORTHOPAEDIC SURGERY

## 2023-04-17 PROCEDURE — 99213 OFFICE O/P EST LOW 20 MIN: CPT | Mod: S$GLB,,, | Performed by: ORTHOPAEDIC SURGERY

## 2023-04-17 PROCEDURE — 1160F PR REVIEW ALL MEDS BY PRESCRIBER/CLIN PHARMACIST DOCUMENTED: ICD-10-PCS | Mod: CPTII,S$GLB,, | Performed by: ORTHOPAEDIC SURGERY

## 2023-04-17 PROCEDURE — 73560 X-RAY EXAM OF KNEE 1 OR 2: CPT | Mod: 26,LT,, | Performed by: RADIOLOGY

## 2023-04-17 PROCEDURE — 1160F RVW MEDS BY RX/DR IN RCRD: CPT | Mod: CPTII,S$GLB,, | Performed by: ORTHOPAEDIC SURGERY

## 2023-04-17 PROCEDURE — 1159F PR MEDICATION LIST DOCUMENTED IN MEDICAL RECORD: ICD-10-PCS | Mod: CPTII,S$GLB,, | Performed by: ORTHOPAEDIC SURGERY

## 2023-04-17 PROCEDURE — 73562 X-RAY EXAM OF KNEE 3: CPT | Mod: 26,RT,, | Performed by: RADIOLOGY

## 2023-04-17 PROCEDURE — 3008F BODY MASS INDEX DOCD: CPT | Mod: CPTII,S$GLB,, | Performed by: ORTHOPAEDIC SURGERY

## 2023-04-17 PROCEDURE — 73562 XR KNEE ORTHO RIGHT: ICD-10-PCS | Mod: 26,RT,, | Performed by: RADIOLOGY

## 2023-04-17 PROCEDURE — 99999 PR PBB SHADOW E&M-EST. PATIENT-LVL IV: CPT | Mod: PBBFAC,,, | Performed by: ORTHOPAEDIC SURGERY

## 2023-04-17 PROCEDURE — 99999 PR PBB SHADOW E&M-EST. PATIENT-LVL IV: ICD-10-PCS | Mod: PBBFAC,,, | Performed by: ORTHOPAEDIC SURGERY

## 2023-04-17 PROCEDURE — 73560 XR KNEE ORTHO RIGHT: ICD-10-PCS | Mod: 26,LT,, | Performed by: RADIOLOGY

## 2023-04-17 RX ORDER — TRIAMCINOLONE ACETONIDE 1 MG/G
OINTMENT TOPICAL
COMMUNITY
Start: 2023-03-06 | End: 2023-11-06 | Stop reason: SDUPTHER

## 2023-04-17 RX ORDER — BETAMETHASONE DIPROPIONATE 0.5 MG/G
CREAM TOPICAL
COMMUNITY
Start: 2023-03-06

## 2023-04-17 RX ORDER — ZOLPIDEM TARTRATE 12.5 MG/1
12.5 TABLET, FILM COATED, EXTENDED RELEASE ORAL
COMMUNITY
Start: 2023-03-28

## 2023-04-17 RX ORDER — TIMOLOL MALEATE 5 MG/ML
SOLUTION/ DROPS OPHTHALMIC
COMMUNITY
Start: 2023-03-15

## 2023-04-17 RX ORDER — PRAVASTATIN SODIUM 20 MG/1
20 TABLET ORAL
COMMUNITY
Start: 2023-03-19

## 2023-04-17 RX ORDER — KETOCONAZOLE 20 MG/ML
SHAMPOO, SUSPENSION TOPICAL
COMMUNITY
Start: 2023-04-01

## 2023-04-17 NOTE — PROGRESS NOTES
47 years old follow-up of her right knee pain.  Patient states that she is feeling better.  More good days than bad days.  Occasional medial-sided knee pain     Exam shows tenderness medially no signs infection or instability    X-rays show medial joint space narrowing    Assessment:  Right knee arthrosis    Plan:  Continue symptomatic care, follow-up as needed

## 2023-04-27 ENCOUNTER — HOSPITAL ENCOUNTER (EMERGENCY)
Facility: HOSPITAL | Age: 48
Discharge: HOME | End: 2023-04-27
Attending: EMERGENCY MEDICINE | Admitting: NURSE PRACTITIONER
Payer: MEDICARE

## 2023-04-27 DIAGNOSIS — S09.90XA INJURY OF HEAD, INITIAL ENCOUNTER: Primary | ICD-10-CM

## 2023-04-27 DIAGNOSIS — S80.01XA CONTUSION OF RIGHT KNEE, INITIAL ENCOUNTER: ICD-10-CM

## 2023-04-27 DIAGNOSIS — S16.1XXA STRAIN OF NECK MUSCLE, INITIAL ENCOUNTER: ICD-10-CM

## 2023-04-27 DIAGNOSIS — R52 PAIN: ICD-10-CM

## 2023-04-27 PROCEDURE — 63600175 PHARM REV CODE 636 W HCPCS: Performed by: NURSE PRACTITIONER

## 2023-04-27 PROCEDURE — 96372 THER/PROPH/DIAG INJ SC/IM: CPT | Performed by: NURSE PRACTITIONER

## 2023-04-27 PROCEDURE — 99285 EMERGENCY DEPT VISIT HI MDM: CPT | Mod: 25

## 2023-04-27 RX ORDER — NAPROXEN 500 MG/1
500 TABLET ORAL 2 TIMES DAILY WITH MEALS
Qty: 60 TABLET | Refills: 0 | Status: SHIPPED | OUTPATIENT
Start: 2023-04-27 | End: 2023-05-02

## 2023-04-27 RX ORDER — KETOROLAC TROMETHAMINE 30 MG/ML
15 INJECTION, SOLUTION INTRAMUSCULAR; INTRAVENOUS
Status: COMPLETED | OUTPATIENT
Start: 2023-04-27 | End: 2023-04-27

## 2023-04-27 RX ORDER — CYCLOBENZAPRINE HCL 10 MG
10 TABLET ORAL 3 TIMES DAILY PRN
Qty: 15 TABLET | Refills: 0 | Status: SHIPPED | OUTPATIENT
Start: 2023-04-27 | End: 2023-05-02

## 2023-04-27 RX ADMIN — KETOROLAC TROMETHAMINE 15 MG: 30 INJECTION, SOLUTION INTRAMUSCULAR; INTRAVENOUS at 09:04

## 2023-04-28 VITALS
SYSTOLIC BLOOD PRESSURE: 178 MMHG | WEIGHT: 157 LBS | RESPIRATION RATE: 18 BRPM | TEMPERATURE: 98 F | BODY MASS INDEX: 26.95 KG/M2 | DIASTOLIC BLOOD PRESSURE: 83 MMHG | OXYGEN SATURATION: 100 % | HEART RATE: 72 BPM
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
## 2023-05-16 ENCOUNTER — OFFICE VISIT (OUTPATIENT)
Dept: ALLERGY | Facility: CLINIC | Age: 48
End: 2023-05-16
Payer: MEDICARE

## 2023-05-16 VITALS — TEMPERATURE: 99 F | DIASTOLIC BLOOD PRESSURE: 82 MMHG | SYSTOLIC BLOOD PRESSURE: 126 MMHG | HEART RATE: 74 BPM

## 2023-05-16 DIAGNOSIS — L30.9 DERMATITIS: ICD-10-CM

## 2023-05-16 DIAGNOSIS — L29.9 PRURITUS: Primary | ICD-10-CM

## 2023-05-16 PROCEDURE — 1159F MED LIST DOCD IN RCRD: CPT | Mod: CPTII,S$GLB,, | Performed by: ALLERGY & IMMUNOLOGY

## 2023-05-16 PROCEDURE — 1160F PR REVIEW ALL MEDS BY PRESCRIBER/CLIN PHARMACIST DOCUMENTED: ICD-10-PCS | Mod: CPTII,S$GLB,, | Performed by: ALLERGY & IMMUNOLOGY

## 2023-05-16 PROCEDURE — 4010F PR ACE/ARB THEARPY RXD/TAKEN: ICD-10-PCS | Mod: CPTII,S$GLB,, | Performed by: ALLERGY & IMMUNOLOGY

## 2023-05-16 PROCEDURE — 1159F PR MEDICATION LIST DOCUMENTED IN MEDICAL RECORD: ICD-10-PCS | Mod: CPTII,S$GLB,, | Performed by: ALLERGY & IMMUNOLOGY

## 2023-05-16 PROCEDURE — 4010F ACE/ARB THERAPY RXD/TAKEN: CPT | Mod: CPTII,S$GLB,, | Performed by: ALLERGY & IMMUNOLOGY

## 2023-05-16 PROCEDURE — 3074F PR MOST RECENT SYSTOLIC BLOOD PRESSURE < 130 MM HG: ICD-10-PCS | Mod: CPTII,S$GLB,, | Performed by: ALLERGY & IMMUNOLOGY

## 2023-05-16 PROCEDURE — 3079F PR MOST RECENT DIASTOLIC BLOOD PRESSURE 80-89 MM HG: ICD-10-PCS | Mod: CPTII,S$GLB,, | Performed by: ALLERGY & IMMUNOLOGY

## 2023-05-16 PROCEDURE — 99214 PR OFFICE/OUTPT VISIT, EST, LEVL IV, 30-39 MIN: ICD-10-PCS | Mod: S$GLB,,, | Performed by: ALLERGY & IMMUNOLOGY

## 2023-05-16 PROCEDURE — 3074F SYST BP LT 130 MM HG: CPT | Mod: CPTII,S$GLB,, | Performed by: ALLERGY & IMMUNOLOGY

## 2023-05-16 PROCEDURE — 1160F RVW MEDS BY RX/DR IN RCRD: CPT | Mod: CPTII,S$GLB,, | Performed by: ALLERGY & IMMUNOLOGY

## 2023-05-16 PROCEDURE — 3079F DIAST BP 80-89 MM HG: CPT | Mod: CPTII,S$GLB,, | Performed by: ALLERGY & IMMUNOLOGY

## 2023-05-16 PROCEDURE — 99214 OFFICE O/P EST MOD 30 MIN: CPT | Mod: S$GLB,,, | Performed by: ALLERGY & IMMUNOLOGY

## 2023-05-16 RX ORDER — TACROLIMUS 1 MG/G
OINTMENT TOPICAL 2 TIMES DAILY
Qty: 100 G | Refills: 3 | Status: SHIPPED | OUTPATIENT
Start: 2023-05-16 | End: 2023-11-06 | Stop reason: SDUPTHER

## 2023-05-16 RX ORDER — OLMESARTAN MEDOXOMIL 5 MG/1
5 TABLET ORAL 2 TIMES DAILY
COMMUNITY
Start: 2023-04-19 | End: 2023-06-12 | Stop reason: ALTCHOICE

## 2023-05-16 RX ORDER — FLUOCINOLONE ACETONIDE 0.11 MG/ML
OIL TOPICAL 3 TIMES DAILY
Qty: 118 ML | Refills: 3 | Status: SHIPPED | OUTPATIENT
Start: 2023-05-16 | End: 2023-11-06 | Stop reason: SDUPTHER

## 2023-05-16 RX ORDER — CLOBETASOL PROPIONATE 0.05 G/100ML
SHAMPOO TOPICAL DAILY
Qty: 118 ML | Refills: 3 | Status: SHIPPED | OUTPATIENT
Start: 2023-05-16 | End: 2023-11-06 | Stop reason: SDUPTHER

## 2023-05-16 RX ORDER — LORATADINE 10 MG/1
10 TABLET ORAL 4 TIMES DAILY
Qty: 120 TABLET | Refills: 4 | Status: SHIPPED | OUTPATIENT
Start: 2023-05-16 | End: 2023-11-06 | Stop reason: SDUPTHER

## 2023-05-16 NOTE — PATIENT INSTRUCTIONS
Claritin increase to four times per day   Or 2 tablets in the morning and 2 tablets at night     Continue doxepin as needed at night for sleep.   Remember will cause sedation, do not take prior to bedtime.     APply protopic or tacrolimus to the face 2-3 times per day for itching    Apply clobetasol shampoo daily for scalp itching.     May apply fluocinolone oil for face or ears and scalp 2 times per day.     Patch test referral sent to [facility]    Follow up in 6 months, sooner if needed.

## 2023-05-16 NOTE — LETTER
May 16, 2023        Teresita Darden MD  [address] [facility] [address]             [facility] - Allergy  [address]  [address]  [facility] [address]  Phone: [phone]  Fax: [phone]   Patient: Mayra Davis   MR Number: [id number]   YOB: 1975   Date of Visit: 5/16/2023       Dear Dr. Darden:    Thank you for referring Mayra Davis to me for evaluation. Below are the relevant portions of my assessment and plan of care.            If you have questions, please do not hesitate to call me. I look forward to following Mayra along with you.    Sincerely,      Deborah Collins MD           CC  No Recipients

## 2023-05-16 NOTE — PROGRESS NOTES
"Subjective:       Patient ID: Mayra Davis is a 47 y.o. female.    Chief Complaint: Itching (Patient is doing good. Got in to see Dr. Jennings with derm.)    HPI    Pt presents  For rash    Last visit 2/2023    Since her last visit,    She notes she tried to get patch testing with Dr. Weil but he no longer performs those tests.   Discussed to see if Dr. Gay can perform the patch test.     She states creams and pills take the edge off of the itch, but she is still itching daily.   Ears and face is where she is itching the most perioral primarily.     Would like to know if there is something she is coming in contact with that is causing her to itch.   She feels a sensation that "something is under there"       Labs performed today, pending result - tsh, crp, spep, zara.   Rx'd clobtetasol, doxepin cream , claritin 10 mg bid - qid, rec bx with derm     Started 2 years ago   Thought it was due to the water in [place]  Then moved to [place] and no change     Pt states she has scalp sores.   Wears a wig   And when tucks wig behind her ears, she has "sores"  Wig is made of human hair and synthetics as well.     If she bathes with fragrant soaps or shampoos and conditioners.     Pt states if she puts alcohol, witch hazel her face will have "bumps" as well.   She can only use water.     She states her skin has not been like this prior to the start of the rash  Lip gloss can also make her lips break out.     If she uses powder - on her face - concealer - nars double wear she will have facial burning.   Even with rinsing the makeup off, she will still have burning.     She also uses eye drops and will have scratching around her nose and lips.     Benadryl will help.     Pt states lotions will also make her itch. Coconut oil will make her itch.       No history of eczema when she was younger.     If she puts hydrocortisone on her face, it will also itch.     Pt has also tried noxema- but this burns. She states her skin " "looks like it is "burnt"  Then her skin peels and she will "pick at it" and sores will form.     50 mg of benadryl seems to help in about 1 hour. - doesn't make her drowsy or sedated.     Pt states however, she can NOT put on any products and still having itching on the scalp and face.     Pt has a strong family history of autoimmunity. Has not been tested her self.     PMH  Stroke  HTN  Blindness    Fhx  Mgma, pgma - RA  Puncle - Lupus  Maunt- lupus     Component      Latest Ref Rng & Units 8/31/2022   TSH      0.400 - 4.000 uIU/mL 2.628     Component      Latest Ref Rng & Units 2/14/2023   CRP      <0.76 mg/dL 0.33       Component      Latest Ref Rng & Units 2/14/2023   PROTEIN TOTAL      6.0 - 8.5 g/dL 5.8 (L)   Albumin grams/dl      2.9 - 4.4 g/dL 2.7 (L)   Alpha-1 grams/dl      0.0 - 0.4 g/dL 0.3   Alpha-2      0.4 - 1.0 g/dL 0.8   Beta      0.7 - 1.3 g/dL 0.9   Gamma      0.4 - 1.8 g/dL 1.0   M-SPIKE, PROT ELECTRO      Not Observed g/dL Not Observed   Globulin, Total      2.2 - 3.9 g/dL 3.1   Albumin/Globulin Ratio      0.7 - 1.7 0.9   Please Note:       Comment     Component      Latest Ref Rng & Units 3/20/2023   TSH      0.340 - 5.600 uIU/mL 3.740       Review of Systems    General: neg unexpected weight changes, fevers, chills, night sweats, malaise  HEENT: see hpi, Neg eye pain, vision changes, ear drainage, nose bleeds, throat tightness, sores in the mouth  CV: Neg chest pain, palpitations, swelling  Resp: see hpi, neg shortness of breath, hemoptysis, cough  GI: see hpi, neg dysphagia, night abdominal pain, reflux, chronic diarrhea, chronic constipation  Derm: See Hpi, neg new rash, neg flushing  Mu/sk: Neg joint pain, joint swelling   Psych: Neg anxiety  neuro: neg chronic headaches, muscle weakness  Endo: neg heat/cold intolerance, chronic fatigue    Objective:     Vitals:    05/16/23 0956   BP: 126/82   Pulse: 74   Temp: 98.6 °F (37 °C)          Physical Exam    General: no acute distress, well " developed well nourished   Skin: neg lesions today       Assessment:       1. Pruritus    2. Dermatitis          Plan:       Pruritus  -     Ambulatory referral/consult to Dermatology; Future; Expected date: 05/23/2023  -     clobetasoL (CLOBEX) 0.05 % shampoo; Apply topically once daily.  Dispense: 118 mL; Refill: 3  -     tacrolimus (PROTOPIC) 0.1 % ointment; Apply topically 2 (two) times daily. Apply to the face twice per day  Dispense: 100 g; Refill: 3  -     fluocinolone (DERMA-SMOOTHE) 0.01 % external oil; Apply topically 3 (three) times daily. Apply to scalp and ears twice per day  Dispense: 118 mL; Refill: 3  -     loratadine (CLARITIN) 10 mg tablet; Take 1 tablet (10 mg total) by mouth 4 (four) times daily.  Dispense: 120 tablet; Refill: 4    Dermatitis  -     clobetasoL (CLOBEX) 0.05 % shampoo; Apply topically once daily.  Dispense: 118 mL; Refill: 3  -     tacrolimus (PROTOPIC) 0.1 % ointment; Apply topically 2 (two) times daily. Apply to the face twice per day  Dispense: 100 g; Refill: 3  -     fluocinolone (DERMA-SMOOTHE) 0.01 % external oil; Apply topically 3 (three) times daily. Apply to scalp and ears twice per day  Dispense: 118 mL; Refill: 3  -     loratadine (CLARITIN) 10 mg tablet; Take 1 tablet (10 mg total) by mouth 4 (four) times daily.  Dispense: 120 tablet; Refill: 4                Purpuric lesions  5/23:  Rash is improved but still itching   Clobetasol shampoo helps   Dermatology referral placed. - [facility] does not patch test   Fuocinolone oil for scalp   Protopic for face.   Did not perform ROAT   Claritin increase from twice per day to four times per day       2/23:  Still present  Recommend derm to biopsy   Continue clobetasol, but only helps somewhat. - spare the face  Having itching around the nose and lips.   Rec patch testing to cosmetic with derm - pt states she sees [facility]. Will have them patch test.   Continue doxepin cream as needed.     Start doxepin 10 mg capsule-  discussed there is risk for drug interaction on this medication  That may include increase in blood pressure, sedation, muscle stiffness, or other side effects.     ANNEL discussed and recommended for a home patch test.     There is no generalized rash. This makes me think less likely drug allergy.       1/23:  Dermatology to biopsy   Consider discoid lupus vs purigo nodularis vs other   Smh labs non fasting      Start clobetasol shampoo if tolerated    Rec patch testing to cosmetic with derm     Take antihistamines qid prn claritin    Start doxepin cream bid prn     Elevated blodo pressure  2/23:  Discussed for her to monitor and make sure the blood pressure does not remain elevated as she has had a history of stroke.       Follow up 6 months, sooner if needed.       Deborah Collins M.D.  Allergy/Immunology  [facility]   [phone] phone  [phone] fax

## 2023-05-17 ENCOUNTER — TELEPHONE (OUTPATIENT)
Dept: CARDIOLOGY | Facility: CLINIC | Age: 48
End: 2023-05-17
Payer: MEDICARE

## 2023-05-17 NOTE — TELEPHONE ENCOUNTER
----- Message from Radha Harrison sent at 5/17/2023  9:12 AM CDT -----  Regarding: Sooner Appointment Request  Contact: patient at [phone]  Type:  Sooner Appointment Request    Caller is requesting a sooner appointment.      Name of Caller:  Patient  When is the first available appointment?  June  Symptoms:  follow up  Best Call Back Number:  [phone]    Additional Information:  Please call and advise. Thank you

## 2023-05-17 NOTE — TELEPHONE ENCOUNTER
----- Message from Fabrizio Prieto sent at 5/17/2023 10:36 AM CDT -----  Contact: self  Type:  Patient Returning Call    Who Called:  Patient  Who Left Message for Patient:  Dulce   Does the patient know what this is regarding?:  yes  Best Call Back Number:  [phone]    Additional Information:  Pt is ret a call back.Please call back

## 2023-05-19 DIAGNOSIS — L30.9 DERMATITIS: ICD-10-CM

## 2023-05-19 DIAGNOSIS — L29.9 PRURITUS: ICD-10-CM

## 2023-05-22 RX ORDER — DOXEPIN HYDROCHLORIDE 10 MG/1
CAPSULE ORAL
Qty: 30 CAPSULE | Refills: 1 | Status: SHIPPED | OUTPATIENT
Start: 2023-05-22 | End: 2023-11-06 | Stop reason: SDUPTHER

## 2023-05-29 ENCOUNTER — OFFICE VISIT (OUTPATIENT)
Dept: CARDIOLOGY | Facility: CLINIC | Age: 48
End: 2023-05-29
Payer: MEDICARE

## 2023-05-29 VITALS
HEIGHT: 64 IN | BODY MASS INDEX: 29.96 KG/M2 | WEIGHT: 175.5 LBS | DIASTOLIC BLOOD PRESSURE: 82 MMHG | SYSTOLIC BLOOD PRESSURE: 168 MMHG | HEART RATE: 77 BPM

## 2023-05-29 DIAGNOSIS — R60.0 PERIPHERAL EDEMA: ICD-10-CM

## 2023-05-29 DIAGNOSIS — I63.20 CEREBROVASCULAR ACCIDENT (CVA) DUE TO OCCLUSION OF PRECEREBRAL ARTERY: ICD-10-CM

## 2023-05-29 DIAGNOSIS — I10 HYPERTENSION, UNSPECIFIED TYPE: ICD-10-CM

## 2023-05-29 PROBLEM — I50.31 ACUTE DIASTOLIC CHF (CONGESTIVE HEART FAILURE): Status: RESOLVED | Noted: 2022-05-19 | Resolved: 2023-05-29

## 2023-05-29 PROBLEM — R03.0 ELEVATED BLOOD PRESSURE READING: Status: RESOLVED | Noted: 2023-02-14 | Resolved: 2023-05-29

## 2023-05-29 PROCEDURE — 4010F PR ACE/ARB THEARPY RXD/TAKEN: ICD-10-PCS | Mod: CPTII,S$GLB,,

## 2023-05-29 PROCEDURE — 3077F PR MOST RECENT SYSTOLIC BLOOD PRESSURE >= 140 MM HG: ICD-10-PCS | Mod: CPTII,S$GLB,,

## 2023-05-29 PROCEDURE — 99999 PR PBB SHADOW E&M-EST. PATIENT-LVL IV: ICD-10-PCS | Mod: PBBFAC,,,

## 2023-05-29 PROCEDURE — 99214 PR OFFICE/OUTPT VISIT, EST, LEVL IV, 30-39 MIN: ICD-10-PCS | Mod: S$GLB,,,

## 2023-05-29 PROCEDURE — 4010F ACE/ARB THERAPY RXD/TAKEN: CPT | Mod: CPTII,S$GLB,,

## 2023-05-29 PROCEDURE — 99214 OFFICE O/P EST MOD 30 MIN: CPT | Mod: S$GLB,,,

## 2023-05-29 PROCEDURE — 1159F MED LIST DOCD IN RCRD: CPT | Mod: CPTII,S$GLB,,

## 2023-05-29 PROCEDURE — 3008F PR BODY MASS INDEX (BMI) DOCUMENTED: ICD-10-PCS | Mod: CPTII,S$GLB,,

## 2023-05-29 PROCEDURE — 99999 PR PBB SHADOW E&M-EST. PATIENT-LVL IV: CPT | Mod: PBBFAC,,,

## 2023-05-29 PROCEDURE — 3077F SYST BP >= 140 MM HG: CPT | Mod: CPTII,S$GLB,,

## 2023-05-29 PROCEDURE — 3079F PR MOST RECENT DIASTOLIC BLOOD PRESSURE 80-89 MM HG: ICD-10-PCS | Mod: CPTII,S$GLB,,

## 2023-05-29 PROCEDURE — 3008F BODY MASS INDEX DOCD: CPT | Mod: CPTII,S$GLB,,

## 2023-05-29 PROCEDURE — 3079F DIAST BP 80-89 MM HG: CPT | Mod: CPTII,S$GLB,,

## 2023-05-29 PROCEDURE — 1159F PR MEDICATION LIST DOCUMENTED IN MEDICAL RECORD: ICD-10-PCS | Mod: CPTII,S$GLB,,

## 2023-05-29 RX ORDER — MINOXIDIL 2.5 MG/1
5 TABLET ORAL 2 TIMES DAILY
Qty: 120 TABLET | Refills: 11 | Status: SHIPPED | OUTPATIENT
Start: 2023-05-29 | End: 2023-07-28 | Stop reason: SDUPTHER

## 2023-05-29 NOTE — PROGRESS NOTES
Subjective:    Patient ID:  Mayra Davis is a 47 y.o. female patient here for evaluation Hypertension    History of Present Illness:     Mrs. Nunez is a 47 year old F who follows with Dr. Cervantes here today with concerns of uncontrolled BP once again. She states she was doing fine on the regime that she was previously on under direction of Dr. Cervantes but then her PCP up in [place] changed it around and she has not been doing well since. This is associated with LE edema and headaches. NO chest pain or SOB. Or stroke like symptoms.       Most Recent Echocardiogram Results  Results for orders placed during the hospital encounter of 09/01/22    Echo Saline Bubble? Yes    Interpretation Summary  · The left ventricle is normal in size with moderate concentric hypertrophy and normal systolic function.  · The estimated ejection fraction is 65%.  · Normal left ventricular diastolic function.  · Normal right ventricular size with normal right ventricular systolic function.  · No interatrial septal defect present.  · Mild tricuspid regurgitation.      Most Recent Nuclear Stress Test Results  Results for orders placed during the hospital encounter of 05/16/22    Nuclear Stress Test    Interpretation Summary    The EKG portion of this study is negative for ischemia.    There were no arrhythmias during stress.      Other Most Recent Cardiology Results  Results for orders placed in visit on 11/18/22    CV US Renal Artery Stenosis Hypertension Complete    Narrative  · Right kidney 11.10 cm.  · Elevated right renal resistive index suggestive of intrinsic kidney disease.  · There is insignificant stenosis (0-59%) in the Right Renal Artery.  · Left kidney 11.50 cm.  · There is insignificant stenosis (0-59%) in the Left Renal Artery.      REVIEW OF SYSTEMS: As noted in HPI   CARDIOVASCULAR: No recent chest pain, palpitations, arm, neck, or jaw pain.  RESPIRATORY: No recent fever, cough chills, SOB.  : No blood in the  urine  GI: No nausea, vomiting, or blood in stool.   MUSCULOSKELETAL: + dependent edema. No myalgias or falls.   NEURO: +HA. No syncope, lightheadedness, or dizziness.  EYES: No sudden changes in vision.     Past Medical History:   Diagnosis Date    Diabetes mellitus     GERD (gastroesophageal reflux disease)     Hypertension     Legally blind      No past surgical history on file.  Social History     Tobacco Use    Smoking status: Never    Smokeless tobacco: Never         Objective      Vitals:    05/29/23 1446   BP: (!) 168/82   Pulse: 77       LAST EKG  Results for orders placed or performed during the hospital encounter of 08/31/22   EKG 12-lead    Collection Time: 08/31/22  3:55 PM    Narrative    Test Reason : R07.89,    Vent. Rate : 080 BPM     Atrial Rate : 080 BPM     P-R Int : 180 ms          QRS Dur : 078 ms      QT Int : 382 ms       P-R-T Axes : 055 -07 065 degrees     QTc Int : 440 ms    Normal sinus rhythm  Anterior infarct (cited on or before 16-MAY-2022)  Abnormal ECG  When compared with ECG of 16-MAY-2022 16:41,  No significant change was found  Confirmed by Brad De León MD ([id number]) on 9/5/2022 5:38:58 PM    Referred By: [email or web address]   SELF           Confirmed By:Brad De León MD     LIPIDS - LAST 2   Lab Results   Component Value Date    CHOL 155 08/31/2022    HDL 77 (H) 08/31/2022    LDLCALC 47.2 (L) 08/31/2022    TRIG 154 (H) 08/31/2022    CHOLHDL 49.7 08/31/2022     CARDIAC PROFILE - LAST 2  Lab Results   Component Value Date     (H) 08/31/2022     (H) 05/16/2022    TROPONINI 0.007 08/31/2022    TROPONINI 0.019 05/17/2022      CBC - LAST 2  Lab Results   Component Value Date    WBC 5.41 03/20/2023    WBC 7.54 09/04/2022    RBC 4.15 03/20/2023    RBC 3.37 (L) 09/04/2022    HGB 12.3 03/20/2023    HGB 10.0 (L) 09/04/2022    HCT 38.3 03/20/2023    HCT 30.7 (L) 09/04/2022     03/20/2023     09/04/2022     Lab Results   Component Value Date    INR 1.0 08/31/2022    INR  "1.0 08/31/2022     CHEMISTRY - LAST 2  Lab Results   Component Value Date     03/20/2023     (L) 09/04/2022    K 4.3 03/20/2023    K 3.7 09/04/2022     03/20/2023     09/04/2022    CO2 28 03/20/2023    CO2 26 09/04/2022    ANIONGAP 5 (L) 03/20/2023    ANIONGAP 4 (L) 09/04/2022    BUN 16 03/20/2023    BUN 19 09/04/2022    CREATININE 1.0 03/20/2023    CREATININE 0.9 09/04/2022     (H) 03/20/2023     (H) 09/04/2022    CALCIUM 8.9 03/20/2023    CALCIUM 8.6 (L) 09/04/2022    MG 2.0 03/20/2023    MG 2.2 09/02/2022    ALBUMIN 3.3 (L) 03/20/2023    ALBUMIN 2.7 (L) 08/31/2022    PROT 6.5 03/20/2023    PROT 5.8 (L) 02/14/2023    ALKPHOS 71 03/20/2023    ALKPHOS 74 08/31/2022    ALT 17 03/20/2023    ALT 17 08/31/2022    AST 22 03/20/2023    AST 17 08/31/2022    BILITOT 0.5 03/20/2023    BILITOT 0.3 08/31/2022      ENDOCRINE - LAST 2  Lab Results   Component Value Date    HGBA1C 7.1 (H) 09/01/2022    HGBA1C 8.2 (H) 05/17/2022    TSH 3.740 03/20/2023    TSH 3.110 02/14/2023        PHYSICAL EXAM  CONSTITUTIONAL: Well built, well nourished in no apparent distress  NECK: no carotid bruit, no JVD  LUNGS: CTA  CHEST WALL: no tenderness  HEART: regular rate and rhythm, S1, S2 normal, no murmur, click, rub or gallop   ABDOMEN: soft, non-tender; bowel sounds normal; no masses,  no organomegaly  EXTREMITIES: Extremities normal, no edema, no calf tenderness noted  NEURO: AAO X 3    I HAVE REVIEWED :    The vital signs, most recent cardiac testing, and most recent pertinent non-cardiology provider notes.    Current Outpatient Medications   Medication Instructions    ALPRAZolam (XANAX) 1 mg, Oral, 3 times daily PRN    aspirin 81 mg, Oral, Daily    augmented betamethasone dipropionate (DIPROLENE-AF) 0.05 % cream APPLY TO SPOT ON BREAST TWICE DAILY    BD ULTRA-FINE MICRO PEN NEEDLE 32 gauge x 1/4" Ndle Subcutaneous    bismuth subsalicylate (BISMAREX) 262 mg Chew 2 tablets, Oral, 4 times daily    busPIRone " (BUSPAR) 7.5 mg, Oral, 3 times daily    carvediloL (COREG) 37.5 mg, Oral, 2 times daily    clobetasoL (CLOBEX) 0.05 % shampoo Topical (Top), Daily    clopidogreL (PLAVIX) 75 mg, Oral, Daily    COMBIGAN 0.2-0.5 % Drop Both Eyes    doxepin (SINEQUAN) 10 MG capsule TAKE 1 CAPSULE BY MOUTH NIGHTLY AS NEEDED (FOR ITCHING). PLEASE STOP MEDICATION IF YOUR BLOOD PRESSURE INCREASES, YOU EXPERIENCE MUSCLE STIFFNESS OR OTHER SIDE EFFECT.    doxepin (ZONALON) 5 % cream Topical (Top), 4 times daily    fluocinolone (DERMA-SMOOTHE) 0.01 % external oil Topical (Top), 3 times daily, Apply to scalp and ears twice per day    furosemide (LASIX) 40 MG tablet TAKE 1 TABLET BY MOUTH EVERY DAY    glipiZIDE (GLUCOTROL) 10 mg, Oral, 2 times daily    JANUMET XR  mg TM24 1 tablet, Oral, Daily    ketoconazole (NIZORAL) 2 % shampoo APPLY TWICE A WEEK LEAVE ON SCALP 10 - 15 MIN, THEN RINSE    lamoTRIgine 50 mg TR24 1 tablet, Oral, Daily    loratadine (CLARITIN) 10 mg, Oral, 4 times daily    metFORMIN (GLUCOPHAGE) 1,000 mg, Oral, 2 times daily with meals    metroNIDAZOLE (FLAGYL) 250 mg, Oral, 4 times daily    minoxidiL (LONITEN) 5 mg, Oral, 2 times daily    mometasone (ELOCON) 0.1 % solution APPLY TO AFFECTED AREA EVERY DAY    mometasone 0.1% (ELOCON) 0.1 % cream Topical (Top), Daily    olmesartan (BENICAR) 5 mg, Oral, 2 times daily    omeprazole (PRILOSEC) 20 mg, Oral, Daily    OXcarbazepine (TRILEPTAL) 150 mg, Oral, 2 times daily    pantoprazole (PROTONIX) 40 mg, Oral, Daily    potassium chloride (MICRO-K) 10 MEQ CpSR 10 mEq, Oral, Daily    pravastatin (PRAVACHOL) 20 mg, Oral    prednisoLONE acetate (PRED FORTE) 1 % DrpS 1 drop, Both Eyes, 4 times daily    QUEtiapine (SEROQUEL) 25 mg, Oral, Nightly    sucralfate (CARAFATE) 1 gram tablet TAKE 1 TABLET (1 GM) BY MOUTH TWICE A DAY    tacrolimus (PROTOPIC) 0.1 % ointment Topical (Top), 2 times daily, Apply to the face twice per day    tetracycline (ACHROMYCIN,SUMYCIN) 500 mg, Oral, 4 times  daily    timolol maleate 0.5% (TIMOPTIC) 0.5 % Drop PLACE ONE DROP IN BOTH EYES TWO TIMES DAILY    TRESIBA FLEXTOUCH U-100 100 unit/mL (3 mL) insulin pen Subcutaneous    triamcinolone acetonide 0.1% (KENALOG) 0.1 % ointment APPLY TO SCALP AND BEHIND EARS TWICE DAILY FOR ITCH    valsartan (DIOVAN) 160 mg, Oral, 2 times daily    VITAMIN D3 1,000 Units, Oral, Daily    zolpidem (AMBIEN CR) 12.5 mg, Oral      Assessment & Plan     HTN (hypertension)  BP remains extremely uncontrolled on current regime   Discontinue clonidine she has several side effects, taper off   Hydralazine and amlodipine caused dizziness   Increase minoxidil to 5 mg BID   Continue coreg 37.5 mg BID   Continue valsartan 160 mg BID   Low Na diet     Cerebrovascular accident (CVA) due to occlusion of precerebral artery  Ischemic with tPA and no residual deficits   Can decrease pravastatin to every other day due to LDL in 40s  Continue DAPT -- no bleeding tendencies noted     Peripheral edema  Normal systolic and diastolic function on echo 9/2022  Exacerbated by uncontrolled HTN and clonidine   Discontinue clonidine   Continue lasix 40 mg as needed for now  Compression socks and leg elevation   Negative venous US for insuff DVT recent past           Follow up in about 4 weeks (around 6/26/2023).     Naomi Peters, PA-C [facility]   Office: [phone]

## 2023-05-29 NOTE — ASSESSMENT & PLAN NOTE
Ischemic with tPA and no residual deficits   Can decrease pravastatin to every other day due to LDL in 40s  Continue DAPT -- no bleeding tendencies noted

## 2023-05-29 NOTE — ASSESSMENT & PLAN NOTE
Normal systolic and diastolic function on echo 9/2022  Exacerbated by uncontrolled HTN and clonidine   Discontinue clonidine   Continue lasix 40 mg as needed for now  Compression socks and leg elevation   Negative venous US for insuff DVT recent past

## 2023-05-29 NOTE — ASSESSMENT & PLAN NOTE
BP remains extremely uncontrolled on current regime   Discontinue clonidine she has several side effects, taper off   Hydralazine and amlodipine caused dizziness   Increase minoxidil to 5 mg BID   Continue coreg 37.5 mg BID   Continue valsartan 160 mg BID   Low Na diet

## 2023-06-09 ENCOUNTER — HOSPITAL ENCOUNTER (EMERGENCY)
Facility: HOSPITAL | Age: 48
Discharge: HOME | End: 2023-06-09
Attending: EMERGENCY MEDICINE
Payer: MEDICARE

## 2023-06-09 VITALS
TEMPERATURE: 99 F | DIASTOLIC BLOOD PRESSURE: 59 MMHG | SYSTOLIC BLOOD PRESSURE: 126 MMHG | HEART RATE: 75 BPM | OXYGEN SATURATION: 98 % | RESPIRATION RATE: 14 BRPM

## 2023-06-09 DIAGNOSIS — J81.0 ACUTE PULMONARY EDEMA: Primary | ICD-10-CM

## 2023-06-09 DIAGNOSIS — R06.02 SHORTNESS OF BREATH: ICD-10-CM

## 2023-06-09 DIAGNOSIS — R07.9 CHEST PAIN: ICD-10-CM

## 2023-06-09 LAB
ALBUMIN SERPL BCP-MCNC: 2.8 G/DL (ref 3.5–5.2)
ALP SERPL-CCNC: 78 U/L (ref 55–135)
ALT SERPL W/O P-5'-P-CCNC: 66 U/L (ref 10–44)
ANION GAP SERPL CALC-SCNC: 7 MMOL/L (ref 8–16)
AST SERPL-CCNC: 56 U/L (ref 10–40)
BASOPHILS # BLD AUTO: 0.05 K/UL (ref 0–0.2)
BASOPHILS NFR BLD: 1.3 % (ref 0–1.9)
BILIRUB SERPL-MCNC: 0.2 MG/DL (ref 0.1–1)
BNP SERPL-MCNC: 155 PG/ML (ref 0–99)
BUN SERPL-MCNC: 13 MG/DL (ref 6–20)
CALCIUM SERPL-MCNC: 8.4 MG/DL (ref 8.7–10.5)
CHLORIDE SERPL-SCNC: 109 MMOL/L (ref 95–110)
CO2 SERPL-SCNC: 25 MMOL/L (ref 23–29)
CREAT SERPL-MCNC: 1.1 MG/DL (ref 0.5–1.4)
DIFFERENTIAL METHOD: ABNORMAL
EOSINOPHIL # BLD AUTO: 0.1 K/UL (ref 0–0.5)
EOSINOPHIL NFR BLD: 3 % (ref 0–8)
ERYTHROCYTE [DISTWIDTH] IN BLOOD BY AUTOMATED COUNT: 12.1 % (ref 11.5–14.5)
EST. GFR  (NO RACE VARIABLE): >60 ML/MIN/1.73 M^2
GLUCOSE SERPL-MCNC: 141 MG/DL (ref 70–110)
HCT VFR BLD AUTO: 25.7 % (ref 37–48.5)
HGB BLD-MCNC: 8.6 G/DL (ref 12–16)
IMM GRANULOCYTES # BLD AUTO: 0.01 K/UL (ref 0–0.04)
IMM GRANULOCYTES NFR BLD AUTO: 0.3 % (ref 0–0.5)
LYMPHOCYTES # BLD AUTO: 1.9 K/UL (ref 1–4.8)
LYMPHOCYTES NFR BLD: 48.6 % (ref 18–48)
MCH RBC QN AUTO: 29.6 PG (ref 27–31)
MCHC RBC AUTO-ENTMCNC: 33.5 G/DL (ref 32–36)
MCV RBC AUTO: 88 FL (ref 82–98)
MONOCYTES # BLD AUTO: 0.5 K/UL (ref 0.3–1)
MONOCYTES NFR BLD: 11.9 % (ref 4–15)
NEUTROPHILS # BLD AUTO: 1.4 K/UL (ref 1.8–7.7)
NEUTROPHILS NFR BLD: 34.9 % (ref 38–73)
NRBC BLD-RTO: 0 /100 WBC
PLATELET # BLD AUTO: 199 K/UL (ref 150–450)
PMV BLD AUTO: 11 FL (ref 9.2–12.9)
POTASSIUM SERPL-SCNC: 4.2 MMOL/L (ref 3.5–5.1)
PROT SERPL-MCNC: 5.8 G/DL (ref 6–8.4)
RBC # BLD AUTO: 2.91 M/UL (ref 4–5.4)
SODIUM SERPL-SCNC: 141 MMOL/L (ref 136–145)
TROPONIN I SERPL DL<=0.01 NG/ML-MCNC: <0.006 NG/ML (ref 0–0.03)
WBC # BLD AUTO: 3.95 K/UL (ref 3.9–12.7)

## 2023-06-09 PROCEDURE — 83880 ASSAY OF NATRIURETIC PEPTIDE: CPT | Performed by: EMERGENCY MEDICINE

## 2023-06-09 PROCEDURE — 93005 ELECTROCARDIOGRAM TRACING: CPT

## 2023-06-09 PROCEDURE — 85025 COMPLETE CBC W/AUTO DIFF WBC: CPT | Performed by: EMERGENCY MEDICINE

## 2023-06-09 PROCEDURE — 99285 EMERGENCY DEPT VISIT HI MDM: CPT | Mod: 25

## 2023-06-09 PROCEDURE — 93010 EKG 12-LEAD: ICD-10-PCS | Mod: ,,, | Performed by: INTERNAL MEDICINE

## 2023-06-09 PROCEDURE — 99284 PR EMERGENCY DEPT VISIT,LEVEL IV: ICD-10-PCS | Mod: ,,, | Performed by: EMERGENCY MEDICINE

## 2023-06-09 PROCEDURE — 99284 EMERGENCY DEPT VISIT MOD MDM: CPT | Mod: ,,, | Performed by: EMERGENCY MEDICINE

## 2023-06-09 PROCEDURE — 80053 COMPREHEN METABOLIC PANEL: CPT | Performed by: EMERGENCY MEDICINE

## 2023-06-09 PROCEDURE — 84484 ASSAY OF TROPONIN QUANT: CPT | Performed by: EMERGENCY MEDICINE

## 2023-06-09 PROCEDURE — 63600175 PHARM REV CODE 636 W HCPCS: Performed by: EMERGENCY MEDICINE

## 2023-06-09 PROCEDURE — 93010 ELECTROCARDIOGRAM REPORT: CPT | Mod: ,,, | Performed by: INTERNAL MEDICINE

## 2023-06-09 PROCEDURE — 96374 THER/PROPH/DIAG INJ IV PUSH: CPT

## 2023-06-09 RX ORDER — FUROSEMIDE 10 MG/ML
40 INJECTION INTRAMUSCULAR; INTRAVENOUS
Status: COMPLETED | OUTPATIENT
Start: 2023-06-09 | End: 2023-06-09

## 2023-06-09 RX ADMIN — FUROSEMIDE 40 MG: 10 INJECTION, SOLUTION INTRAMUSCULAR; INTRAVENOUS at 03:06

## 2023-06-09 NOTE — ED NOTES
Patient identifiers verified and correct for   LOC: The patient is awake, alert and aware of environment with an appropriate affect, the patient is oriented x 3 and speaking appropriately.   APPEARANCE: Patient appears comfortable and in no acute distress, patient is clean and well groomed.  SKIN: The skin is warm and dry, color consistent with ethnicity, patient has normal skin turgor and moist mucus membranes, skin intact, no breakdown or bruising noted.   MUSCULOSKELETAL: Patient moving all extremities spontaneously, no swelling noted.  RESPIRATORY: Airway is open and patent, respirations are spontaneous, patient has a normal effort and rate, no accessory muscle use noted, pt placed on continuous pulse ox with O2 sats noted at 97% on room air.  CARDIAC: Pt placed on cardiac monitor. Patient has a normal rate and regular rhythm, generalized edema noted, capillary refill < 3 seconds. Left sided CP.  GASTRO: Soft and non tender to palpation, no distention noted, normoactive bowel sounds present in all four quadrants. Pt states bowel movements have been regular.  : Pt denies any pain or frequency with urination.  NEURO: Pt opens eyes spontaneously, behavior appropriate to situation, follows commands, facial expression symmetrical, bilateral hand grasp equal and even, purposeful motor response noted, normal sensation in all extremities when touched with a finger.

## 2023-06-09 NOTE — ED PROVIDER NOTES
Encounter Date: 6/9/2023       History     Chief Complaint   Patient presents with    Chest Pain     Substernal radiating to back. +SOB with activity and laying flat since yesterday.        Mayra Davis is a 47-year-old female past medical history of hypertension, GERD, diabetes, reported history of congestive heart failure presenting today for chest pain and shortness of breath.  She reports substernal pressure that is been constant since this morning.  She has shortness of breath with exertion and with lying flat.  She also reports bilateral lower extremity swelling.  No fever, chills or cough.  She was admitted at [facility] for acute chest pain, troponin test were normal, stress test on 06/05 was negative.  She states she was initially on Lasix p.r.n., then increase to 20 mg daily and is now currently on 40 mg daily.  She reports only urinating twice yesterday despite increasing her Lasix.      Review of patient's allergies indicates:   Allergen Reactions    Vancomycin analogues Rash     Past Medical History:   Diagnosis Date    Diabetes mellitus     GERD (gastroesophageal reflux disease)     Hypertension     Legally blind      No past surgical history on file.  No family history on file.  Social History     Tobacco Use    Smoking status: Never    Smokeless tobacco: Never     Review of Systems    Physical Exam     Initial Vitals [06/09/23 0204]   BP Pulse Resp Temp SpO2   (!) 142/66 72 18 98.8 °F (37.1 °C) 98 %      MAP       --         Physical Exam    Nursing note and vitals reviewed.  Constitutional: She appears well-developed and well-nourished. No distress.   HENT:   Mouth/Throat: Oropharynx is clear and moist.   Eyes: Conjunctivae are normal. No scleral icterus.   Neck: No JVD present.   Cardiovascular:  Normal rate, regular rhythm and intact distal pulses.           Pulmonary/Chest: Breath sounds normal. No respiratory distress. She has no wheezes. She has no rales.   Abdominal: Abdomen is soft. Bowel  sounds are normal. She exhibits no distension. There is no abdominal tenderness. There is no rebound.   Musculoskeletal:         General: Edema (trace bilateral leg edema) present.     Lymphadenopathy:     She has no cervical adenopathy.   Neurological: She is alert and oriented to person, place, and time.   Skin: Skin is warm. No rash noted.       ED Course   Procedures  Labs Reviewed   CBC W/ AUTO DIFFERENTIAL - Abnormal; Notable for the following components:       Result Value    RBC 2.91 (*)     Hemoglobin 8.6 (*)     Hematocrit 25.7 (*)     Gran # (ANC) 1.4 (*)     Gran % 34.9 (*)     Lymph % 48.6 (*)     All other components within normal limits   COMPREHENSIVE METABOLIC PANEL - Abnormal; Notable for the following components:    Glucose 141 (*)     Calcium 8.4 (*)     Total Protein 5.8 (*)     Albumin 2.8 (*)     AST 56 (*)     ALT 66 (*)     Anion Gap 7 (*)     All other components within normal limits   B-TYPE NATRIURETIC PEPTIDE - Abnormal; Notable for the following components:     (*)     All other components within normal limits   TROPONIN I   HIV 1 / 2 ANTIBODY   HEPATITIS C ANTIBODY     EKG Readings: (Independently Interpreted)   EKG:  Sinus rhythm at 75, low-voltage QRS, no ST elevation or depression     Imaging Results    None          Medications   furosemide injection 40 mg (40 mg Intravenous Given 6/9/23 0356)     Medical Decision Making:   History:   Old Medical Records: I decided to obtain old medical records.  Old Records Summarized: records from another hospital.       <> Summary of Records: 6/5/2023:  [facility]  CONCLUSIONS   Lexiscan MPI stress test   + dyspnea with infusion   No chest pain   Normal HR and BP with infusion   Normal ST segments and no arrhythmia noted     PERFUSION SCAN     Normal     No infarct or ischemia     MIBI     Normal       EF 71%     Initial Assessment:   Emergent evaluation of 47-year-old female presenting today for chest discomfort, shortness  of breath bilateral leg swelling.    She is mildly hypertensive otherwise vital signs stable    No respiratory distress, has bilateral trace pitting edema of lower extremities  Differential Diagnosis:   Dependent edema, CHF, nephrotic syndrome, CHRISTELLE  Independently Interpreted Test(s):   I have ordered and independently interpreted X-rays - see prior notes.  I have ordered and independently interpreted EKG Reading(s) - see prior notes  Clinical Tests:   Lab Tests: Reviewed and Ordered  Radiological Study: Ordered and Reviewed  Medical Tests: Ordered and Reviewed  ED Management:  - labs  - EKG  - CXR  - lasix 40 mg IV           ED Course as of 06/09/23 0545   Fri Jun 09, 2023   0329 Hemoglobin(!): 8.6  Previous 10.2- denies bleeding   [GM]   0521 Chest x-ray reviewed and does show bilateral vascular congestion with interstitial markings consistent with pulmonary edema.  Patient has been treated with Lasix 40 mg IV. [GM]   0522 Her saturations remained stable with no hypoxemia.  She urinated several times after given Lasix.  Discussed results with her at bedside.  Recommend follow-up with the PCP.  She expressed understanding.  Stable for discharge. [GM]      ED Course User Index  [GM] Renetta Mojica MD                   Clinical Impression:   Final diagnoses:  [R07.9] Chest pain  [R06.02] Shortness of breath  [J81.0] Acute pulmonary edema (Primary)               Renetta Mojica MD  06/09/23 0503

## 2023-06-09 NOTE — DISCHARGE INSTRUCTIONS
Increase your Lasix to 60 mg tomorrow.  If you urinating well, can decreased back to 40 mg.  Return to emergency department for worsening symptoms otherwise follow up with the primary doctor

## 2023-06-10 ENCOUNTER — HOSPITAL ENCOUNTER (EMERGENCY)
Facility: HOSPITAL | Age: 48
Discharge: HOME | End: 2023-06-11
Attending: EMERGENCY MEDICINE
Payer: MEDICARE

## 2023-06-10 DIAGNOSIS — R06.02 SOB (SHORTNESS OF BREATH): ICD-10-CM

## 2023-06-10 LAB
ALBUMIN SERPL BCP-MCNC: 3.1 G/DL (ref 3.5–5.2)
ALP SERPL-CCNC: 76 U/L (ref 55–135)
ALT SERPL W/O P-5'-P-CCNC: 58 U/L (ref 10–44)
ANION GAP SERPL CALC-SCNC: 7 MMOL/L (ref 8–16)
AST SERPL-CCNC: 44 U/L (ref 10–40)
BASOPHILS # BLD AUTO: 0.03 K/UL (ref 0–0.2)
BASOPHILS NFR BLD: 0.7 % (ref 0–1.9)
BILIRUB SERPL-MCNC: 0.3 MG/DL (ref 0.1–1)
BILIRUB UR QL STRIP: NEGATIVE
BNP SERPL-MCNC: 115 PG/ML (ref 0–99)
BUN SERPL-MCNC: 14 MG/DL (ref 6–20)
CALCIUM SERPL-MCNC: 8.8 MG/DL (ref 8.7–10.5)
CHLORIDE SERPL-SCNC: 105 MMOL/L (ref 95–110)
CLARITY UR REFRACT.AUTO: CLEAR
CO2 SERPL-SCNC: 27 MMOL/L (ref 23–29)
COLOR UR AUTO: COLORLESS
CREAT SERPL-MCNC: 1.3 MG/DL (ref 0.5–1.4)
DIFFERENTIAL METHOD: ABNORMAL
EOSINOPHIL # BLD AUTO: 0.1 K/UL (ref 0–0.5)
EOSINOPHIL NFR BLD: 2.7 % (ref 0–8)
ERYTHROCYTE [DISTWIDTH] IN BLOOD BY AUTOMATED COUNT: 12.3 % (ref 11.5–14.5)
EST. GFR  (NO RACE VARIABLE): 51 ML/MIN/1.73 M^2
GLUCOSE SERPL-MCNC: 149 MG/DL (ref 70–110)
GLUCOSE UR QL STRIP: NEGATIVE
HCT VFR BLD AUTO: 27.3 % (ref 37–48.5)
HGB BLD-MCNC: 8.8 G/DL (ref 12–16)
HGB UR QL STRIP: NEGATIVE
IMM GRANULOCYTES # BLD AUTO: 0 K/UL (ref 0–0.04)
IMM GRANULOCYTES NFR BLD AUTO: 0 % (ref 0–0.5)
KETONES UR QL STRIP: NEGATIVE
LEUKOCYTE ESTERASE UR QL STRIP: NEGATIVE
LYMPHOCYTES # BLD AUTO: 2 K/UL (ref 1–4.8)
LYMPHOCYTES NFR BLD: 50.1 % (ref 18–48)
MCH RBC QN AUTO: 29.3 PG (ref 27–31)
MCHC RBC AUTO-ENTMCNC: 32.2 G/DL (ref 32–36)
MCV RBC AUTO: 91 FL (ref 82–98)
MONOCYTES # BLD AUTO: 0.5 K/UL (ref 0.3–1)
MONOCYTES NFR BLD: 11.5 % (ref 4–15)
NEUTROPHILS # BLD AUTO: 1.4 K/UL (ref 1.8–7.7)
NEUTROPHILS NFR BLD: 35 % (ref 38–73)
NITRITE UR QL STRIP: NEGATIVE
NRBC BLD-RTO: 0 /100 WBC
PH UR STRIP: 8 [PH] (ref 5–8)
PLATELET # BLD AUTO: 218 K/UL (ref 150–450)
PMV BLD AUTO: 10.7 FL (ref 9.2–12.9)
POTASSIUM SERPL-SCNC: 3.9 MMOL/L (ref 3.5–5.1)
PROT SERPL-MCNC: 6.2 G/DL (ref 6–8.4)
PROT UR QL STRIP: ABNORMAL
RBC # BLD AUTO: 3 M/UL (ref 4–5.4)
SODIUM SERPL-SCNC: 139 MMOL/L (ref 136–145)
SP GR UR STRIP: 1 (ref 1–1.03)
TROPONIN I SERPL DL<=0.01 NG/ML-MCNC: <0.006 NG/ML (ref 0–0.03)
URN SPEC COLLECT METH UR: ABNORMAL
WBC # BLD AUTO: 4.01 K/UL (ref 3.9–12.7)

## 2023-06-10 PROCEDURE — 93010 ELECTROCARDIOGRAM REPORT: CPT | Mod: ,,, | Performed by: INTERNAL MEDICINE

## 2023-06-10 PROCEDURE — 83880 ASSAY OF NATRIURETIC PEPTIDE: CPT

## 2023-06-10 PROCEDURE — 99284 PR EMERGENCY DEPT VISIT,LEVEL IV: ICD-10-PCS | Mod: GC,,, | Performed by: EMERGENCY MEDICINE

## 2023-06-10 PROCEDURE — 84484 ASSAY OF TROPONIN QUANT: CPT | Mod: 91

## 2023-06-10 PROCEDURE — 94761 N-INVAS EAR/PLS OXIMETRY MLT: CPT

## 2023-06-10 PROCEDURE — 99285 EMERGENCY DEPT VISIT HI MDM: CPT | Mod: 25

## 2023-06-10 PROCEDURE — 99284 EMERGENCY DEPT VISIT MOD MDM: CPT | Mod: GC,,, | Performed by: EMERGENCY MEDICINE

## 2023-06-10 PROCEDURE — 93005 ELECTROCARDIOGRAM TRACING: CPT

## 2023-06-10 PROCEDURE — 25000003 PHARM REV CODE 250

## 2023-06-10 PROCEDURE — 80053 COMPREHEN METABOLIC PANEL: CPT

## 2023-06-10 PROCEDURE — 93010 EKG 12-LEAD: ICD-10-PCS | Mod: ,,, | Performed by: INTERNAL MEDICINE

## 2023-06-10 PROCEDURE — 85025 COMPLETE CBC W/AUTO DIFF WBC: CPT

## 2023-06-10 PROCEDURE — 81003 URINALYSIS AUTO W/O SCOPE: CPT

## 2023-06-10 RX ORDER — ACETAMINOPHEN 500 MG
1000 TABLET ORAL
Status: COMPLETED | OUTPATIENT
Start: 2023-06-10 | End: 2023-06-10

## 2023-06-10 RX ORDER — MAG HYDROX/ALUMINUM HYD/SIMETH 200-200-20
30 SUSPENSION, ORAL (FINAL DOSE FORM) ORAL ONCE
Status: COMPLETED | OUTPATIENT
Start: 2023-06-10 | End: 2023-06-10

## 2023-06-10 RX ORDER — LIDOCAINE HYDROCHLORIDE 20 MG/ML
15 SOLUTION OROPHARYNGEAL ONCE
Status: COMPLETED | OUTPATIENT
Start: 2023-06-10 | End: 2023-06-10

## 2023-06-10 RX ADMIN — LIDOCAINE HYDROCHLORIDE 15 ML: 20 SOLUTION ORAL at 10:06

## 2023-06-10 RX ADMIN — ACETAMINOPHEN 1000 MG: 500 TABLET ORAL at 10:06

## 2023-06-10 RX ADMIN — ALUMINUM HYDROXIDE, MAGNESIUM HYDROXIDE, AND SIMETHICONE 30 ML: 200; 200; 20 SUSPENSION ORAL at 10:06

## 2023-06-11 VITALS
TEMPERATURE: 99 F | RESPIRATION RATE: 19 BRPM | HEART RATE: 75 BPM | SYSTOLIC BLOOD PRESSURE: 128 MMHG | DIASTOLIC BLOOD PRESSURE: 61 MMHG | OXYGEN SATURATION: 96 %

## 2023-06-11 LAB — TROPONIN I SERPL DL<=0.01 NG/ML-MCNC: <0.006 NG/ML (ref 0–0.03)

## 2023-06-11 NOTE — ED NOTES
Ambulated with out assistance (except to guide patient down hallway-no physical limitations just visual) 100 + ft.  HR 74-78 bpm  SPO2 >96% on room air.

## 2023-06-11 NOTE — ED PROVIDER NOTES
Encounter Date: 6/10/2023       History     Chief Complaint   Patient presents with    Shortness of Breath     SOB x2 hours and BLE swelling.  was seen for same complaint 2 days ago and was told to double Lasix dose.  is not helping. Hx of HF and CVA     47-year-old female with a history of severe uncontrolled hypertension, diabetes, CVA and GERD presents with a chief complaint of shortness of breath.  She says that she is felt short of breath with activity all day today, she said that she had significant shortness of breath when she lays flat last night as well.  She says that she increased her dose of Lasix as she was instructed by the ER yesterday, but says that she is not urinated at all today and she feels more bloated.  She says that she feels like her abdomen is swollen as well as her legs.  She is also endorsing a mild headache and mild pain in the epigastrium that she says is chronic for her.  She denies any fevers, cough, chest pain, nausea, vomiting, diarrhea, dysuria or new rashes.    The history is provided by the patient. No  was used.   Review of patient's allergies indicates:   Allergen Reactions    Vancomycin analogues Rash     Past Medical History:   Diagnosis Date    Diabetes mellitus     GERD (gastroesophageal reflux disease)     Hypertension     Legally blind      Past Surgical History:   Procedure Laterality Date     SECTION      Twice    HYSTERECTOMY      TUBAL LIGATION       History reviewed. No pertinent family history.  Social History     Tobacco Use    Smoking status: Never    Smokeless tobacco: Never   Substance Use Topics    Alcohol use: Not Currently    Drug use: Never     Review of Systems    Physical Exam     Initial Vitals [06/10/23 2035]   BP Pulse Resp Temp SpO2   135/66 75 18 98.7 °F (37.1 °C) 98 %      MAP       --         Physical Exam    Nursing note and vitals reviewed.  Constitutional: She appears well-developed and well-nourished. She  is not diaphoretic. No distress.   HENT:   Head: Normocephalic and atraumatic.   Eyes: Pupils are equal, round, and reactive to light.   Neck: Neck supple. No JVD present.   Normal range of motion.  Cardiovascular:  Normal rate, regular rhythm, normal heart sounds and intact distal pulses.           Pulmonary/Chest: Breath sounds normal. She has no wheezes. She has no rhonchi. She has no rales.   Abdominal: Abdomen is soft. There is abdominal tenderness.   Mild epigastric and left lower quadrant tenderness There is no rebound and no guarding.   Musculoskeletal:         General: Edema present. No tenderness.      Cervical back: Normal range of motion and neck supple.      Comments: 2+ pitting edema BL     Neurological: She is alert and oriented to person, place, and time.   Skin: Skin is warm and dry. Capillary refill takes less than 2 seconds. No rash noted. No erythema. No pallor.   Psychiatric: She has a normal mood and affect. Her behavior is normal. Judgment and thought content normal.       ED Course   Procedures  Labs Reviewed   CBC W/ AUTO DIFFERENTIAL - Abnormal; Notable for the following components:       Result Value    RBC 3.00 (*)     Hemoglobin 8.8 (*)     Hematocrit 27.3 (*)     Gran # (ANC) 1.4 (*)     Gran % 35.0 (*)     Lymph % 50.1 (*)     All other components within normal limits   COMPREHENSIVE METABOLIC PANEL - Abnormal; Notable for the following components:    Glucose 149 (*)     Albumin 3.1 (*)     AST 44 (*)     ALT 58 (*)     Anion Gap 7 (*)     eGFR 51.0 (*)     All other components within normal limits   B-TYPE NATRIURETIC PEPTIDE - Abnormal; Notable for the following components:     (*)     All other components within normal limits   URINALYSIS, REFLEX TO URINE CULTURE - Abnormal; Notable for the following components:    Color, UA Colorless (*)     Protein, UA Trace (*)     All other components within normal limits    Narrative:     Specimen Source->Urine   TROPONIN I   TROPONIN I    HIV 1 / 2 ANTIBODY   HEPATITIS C ANTIBODY        ECG Results              EKG 12-lead (Final result)  Result time 06/11/23 18:08:42      Final result by Interface, Lab In [facility] (06/11/23 18:08:42)                   Narrative:    Test Reason : R07.9,    Vent. Rate : 077 BPM     Atrial Rate : 077 BPM     P-R Int : 196 ms          QRS Dur : 070 ms      QT Int : 396 ms       P-R-T Axes : 054 073 040 degrees     QTc Int : 448 ms    Normal sinus rhythm  Abnormal R wave progression  Abnormal ECG  When compared with ECG of 10-CAROL-2023 20:37,  No significant change was found  Confirmed by Josue Mariano MD ([id number]) on 6/11/2023 6:08:31 PM    Referred By: [email or web address]   SELF           Confirmed By:Josue Mariano MD                                     EKG 12-lead (Final result)  Result time 06/11/23 18:06:43      Final result by Interface, Lab In [facility] (06/11/23 18:06:43)                   Narrative:    Test Reason : R06.02,    Vent. Rate : 074 BPM     Atrial Rate : 074 BPM     P-R Int : 194 ms          QRS Dur : 066 ms      QT Int : 398 ms       P-R-T Axes : 066 053 064 degrees     QTc Int : 441 ms    Normal sinus rhythm  Low voltage, limb leads  Abnormal R wave progression  Abnormal ECG  When compared with ECG of 09-JUN-2023 02:15,  No significant change was found  Confirmed by Josue Mariano MD ([id number]) on 6/11/2023 6:06:38 PM    Referred By: [email or web address]   SELF           Confirmed By:Josue Mariano MD                                  Imaging Results              X-Ray Chest AP Portable (Final result)  Result time 06/10/23 22:14:43      Final result by Sharif Mcgraw MD (06/10/23 22:14:43)                   Impression:      Mild diminished depth of inspiration without additional radiographic evidence for superimposed acute intrathoracic process.      Electronically signed by: Sharif Mcgraw  Date:    06/10/2023  Time:    22:14               Narrative:    EXAMINATION:  XR CHEST AP PORTABLE    CLINICAL HISTORY:  Chest  Pain;    TECHNIQUE:  Single frontal view of the chest was performed.    COMPARISON:  Chest radiograph June 9, 2023    FINDINGS:  Single portable chest view is submitted.  There is mild diminished depth of inspiration.  The cardiomediastinal silhouette appears stable.    Mild accentuation of pulmonary bronchovascular markings consistent with diminished depth of inspiration noted.  There is no evidence for superimposed confluent infiltrate or consolidation, significant pleural effusion or pneumothorax.    The visualized osseous structures appear intact.                                       Medications   acetaminophen tablet 1,000 mg (1,000 mg Oral Given 6/10/23 2215)   aluminum-magnesium hydroxide-simethicone 200-200-20 mg/5 mL suspension 30 mL (30 mLs Oral Given 6/10/23 2216)     And   LIDOcaine HCl 2% oral solution 15 mL (15 mLs Oral Given 6/10/23 2216)     Medical Decision Making:   History:   Old Medical Records: I decided to obtain old medical records.  Old Records Summarized: records from clinic visits and records from previous admission(s).       <> Summary of Records: Prior records reviewed for past medical history and current medications  Initial Assessment:   47-year-old female in no acute distress.  Initial EKG was not concerning for ischemia.  Physical exam did not show any JVD, I did not appreciate any rales or lower extremity edema my exam.  Patient had mild epigastric tenderness and left lower quadrant tenderness, I treated her discomfort with Tylenol and GI cocktail.  Bedside pocus did not show any clinically significant pericardial effusion, bladder was full.  Differential Diagnosis:   ACS vs CHF exacerbation vs cardiac tamponade; I considered PE, but patient is PERC negative  Independently Interpreted Test(s):   I have ordered and independently interpreted EKG Reading(s) - see summary below       <> Summary of EKG Reading(s): NSR at 74, all intervals wnl, No ST elevations or depressions  Clinical  Tests:   Lab Tests: Reviewed and Ordered  Radiological Study: Reviewed and Ordered  Medical Tests: Reviewed and Ordered  ED Management:  Initial troponin was negative, BNP is modestly improved from yesterday, hgb is at baseline, CXR was significantly improved from yesterday.  I do not think that the patient is having a CHF exacerbation or ACS, but based on the patient's history and risk factors, we will obtain a delta troponin.  If this is normal, the patient can likely be safely discharged home.  The patient was signed out to Dr. Scott with dispo pending 2nd troponin.  Additional MDM:   PERC Rule:   Age is greater than or equal to 50 = 0.0  Heart Rate is greater than or equal to 100 = 0.0  SaO2 on room air < 95% = 0.0  Unilateral leg swelling = 0.0  Hemoptysis = 0.0  Recent surgery or trauma = 0.0  Prior PE or DVT =  0.0  Hormone use = 0.00  PERC Score = 0  Heart Score:    History:          Moderately suspicious.  ECG:             Normal  Age:               45-65 years  Risk factors: 1-2 risk factors  Troponin:       Less than or equal to normal limit  Final Score: 3         Attending Attestation:   Physician Attestation Statement for Resident:  As the supervising MD   Physician Attestation Statement: I have personally seen and examined this patient.   I agree with the above history.  -:   As the supervising MD I agree with the above PE.     As the supervising MD I agree with the above treatment, course, plan, and disposition.    I have reviewed and agree with the residents interpretation of the following: x-rays and EKG.             Attending ED Notes:   STAFF ATTENDING PHYSICIAN NOTE:  I have individually/jointly evaluated Mayra GOMEZ Blocker and discussed their ED management with the resident physician. I have also reviewed their notes, assessments, and procedures documented.  I was present during all critical portions of any procedure(s) performed on Mayra L Blocker.   ____________________  Jose Will  MD, [facility]  Emergency Medicine Staff                       Clinical Impression:   Final diagnoses:  [R06.02] SOB (shortness of breath)        ED Disposition Condition    Discharge Stable          ED Prescriptions    None       Follow-up Information       Follow up With Specialties Details Why Contact Info    Teresita Darden MD Family Medicine  As needed, If symptoms worsen [address] [facility] [address]  [phone]               Dandre Mantilla MD  Resident  06/11/23 0102       Roderick Will MD  06/16/23 0987

## 2023-06-11 NOTE — ED TRIAGE NOTES
Complaint of swollen legs, feeling like her stomach is becoming swollen, anuria. States took extra fluid pills as instructed and it did not relieve fluids.

## 2023-06-11 NOTE — DISCHARGE INSTRUCTIONS
Diagnosis: Shortness of breath    Tests today showed:   Labs Reviewed   CBC W/ AUTO DIFFERENTIAL - Abnormal; Notable for the following components:       Result Value    RBC 3.00 (*)     Hemoglobin 8.8 (*)     Hematocrit 27.3 (*)     Gran # (ANC) 1.4 (*)     Gran % 35.0 (*)     Lymph % 50.1 (*)     All other components within normal limits   COMPREHENSIVE METABOLIC PANEL - Abnormal; Notable for the following components:    Glucose 149 (*)     Albumin 3.1 (*)     AST 44 (*)     ALT 58 (*)     Anion Gap 7 (*)     eGFR 51.0 (*)     All other components within normal limits   B-TYPE NATRIURETIC PEPTIDE - Abnormal; Notable for the following components:     (*)     All other components within normal limits   URINALYSIS, REFLEX TO URINE CULTURE - Abnormal; Notable for the following components:    Color, UA Colorless (*)     Protein, UA Trace (*)     All other components within normal limits    Narrative:     Specimen Source->Urine   TROPONIN I   HIV 1 / 2 ANTIBODY   HEPATITIS C ANTIBODY   TROPONIN I   POCT TROPONIN     Imaging Results              X-Ray Chest AP Portable (Final result)  Result time 06/10/23 22:14:43      Final result by Sharif Mcgraw MD (06/10/23 22:14:43)                   Impression:      Mild diminished depth of inspiration without additional radiographic evidence for superimposed acute intrathoracic process.      Electronically signed by: Sharif Mcgraw  Date:    06/10/2023  Time:    22:14               Narrative:    EXAMINATION:  XR CHEST AP PORTABLE    CLINICAL HISTORY:  Chest Pain;    TECHNIQUE:  Single frontal view of the chest was performed.    COMPARISON:  Chest radiograph June 9, 2023    FINDINGS:  Single portable chest view is submitted.  There is mild diminished depth of inspiration.  The cardiomediastinal silhouette appears stable.    Mild accentuation of pulmonary bronchovascular markings consistent with diminished depth of inspiration noted.  There is no evidence for superimposed  confluent infiltrate or consolidation, significant pleural effusion or pneumothorax.    The visualized osseous structures appear intact.                                      Treatments you had today:   Medications   acetaminophen tablet 1,000 mg (1,000 mg Oral Given 6/10/23 2215)   aluminum-magnesium hydroxide-simethicone 200-200-20 mg/5 mL suspension 30 mL (30 mLs Oral Given 6/10/23 2216)     And   LIDOcaine HCl 2% oral solution 15 mL (15 mLs Oral Given 6/10/23 2216)       Home Care Instructions:  - Continue taking double dose of lasix until you follow up with your cardiologist  - Continue taking your other medications as prescribed    Follow-Up Plan:  - Follow-up with: Primary care doctor within 3 - 5 days  - Additional testing and/or evaluation as directed by your primary doctor    Return to the Emergency Department for symptoms including: worsening symptoms, worsening headache or a new headache which is severe, headache with vision changes, headache with neck stiffness or fever, numbness or tingling, weakness, problems with coordination, speech changes, vomiting with inability to hold down fluids, severe headache different from previous headaches, dizziness, passing out/fainting/unconsciousness, or other concerning symptoms.

## 2023-06-11 NOTE — ED NOTES
MD notified of BP of 94/50, pt asymptomatic, no new orders, states to only monitor pt at this time

## 2023-06-12 ENCOUNTER — TELEPHONE (OUTPATIENT)
Dept: CARDIOLOGY | Facility: CLINIC | Age: 48
End: 2023-06-12
Payer: MEDICARE

## 2023-06-12 DIAGNOSIS — I10 HYPERTENSION, UNSPECIFIED TYPE: Primary | ICD-10-CM

## 2023-06-12 RX ORDER — VALSARTAN 160 MG/1
160 TABLET ORAL 2 TIMES DAILY
Qty: 180 TABLET | Refills: 3 | Status: SHIPPED | OUTPATIENT
Start: 2023-06-12 | End: 2024-06-11

## 2023-06-12 NOTE — TELEPHONE ENCOUNTER
Please advise: pt seen at [facility] ER x3 in last two weeks plus visit to [facility]; states she feels like she still can't get the fluid off, even doubling the lasix as instructed by the ER; she is taking 40mg BID  She says she is a little bit short of breath, but less than she was; her feet as still a little swollen, her abdomen went down a lot; says she still feels the extra fluid in her legs; says she doesn't wear the compression hose because her abdomen then swells

## 2023-06-12 NOTE — TELEPHONE ENCOUNTER
----- Message from Fabrizio Prieto sent at 6/12/2023  3:14 PM CDT -----  Contact: self  Type:  Sooner Appointment Request    Caller is requesting a sooner appointment.  Caller declined first available appointment listed below.  Caller will not accept being placed on the waitlist and is requesting a message be sent to doctor.    Name of Caller:  Patient  When is the first available appointment?  6/27  Symptoms:  Fluid build up   Best Call Back Number:  [phone]    Additional Information:  Pt states she would like to speak with office regarding fluid on her body and medication not working.Please call back

## 2023-06-12 NOTE — TELEPHONE ENCOUNTER
Advised pt per Virginia Doe (Continue lasix 40 mg Bid, as long as she is feeling better it will take time to get fluid off.   Please make sure she is taking the correct blood pressure medications:   minoxidil to 5 mg BID   coreg 37.5 mg BID   valsartan 160 mg BID)

## 2023-06-22 NOTE — PROVIDER PROGRESS NOTES - EMERGENCY DEPT.
Encounter Date: 6/10/2023    ED Physician Progress Notes            Received sign-out from previous team plan was follow-up blood work cardiac workup and reassess.  Patient resting comfortably in bed no acute distress.  Two set troponin within normal limits, patient feeling better wishes to go home.  Return precautions discussed patient will be discharged.

## 2023-06-27 ENCOUNTER — LAB VISIT (OUTPATIENT)
Dept: LAB | Facility: HOSPITAL | Age: 48
End: 2023-06-27
Attending: INTERNAL MEDICINE
Payer: MEDICARE

## 2023-06-27 ENCOUNTER — OFFICE VISIT (OUTPATIENT)
Dept: CARDIOLOGY | Facility: CLINIC | Age: 48
End: 2023-06-27
Payer: MEDICARE

## 2023-06-27 VITALS
WEIGHT: 168.88 LBS | HEART RATE: 79 BPM | HEIGHT: 64 IN | BODY MASS INDEX: 28.83 KG/M2 | SYSTOLIC BLOOD PRESSURE: 151 MMHG | DIASTOLIC BLOOD PRESSURE: 68 MMHG

## 2023-06-27 DIAGNOSIS — I63.20 CEREBROVASCULAR ACCIDENT (CVA) DUE TO OCCLUSION OF PRECEREBRAL ARTERY: ICD-10-CM

## 2023-06-27 DIAGNOSIS — I63.20 CEREBROVASCULAR ACCIDENT (CVA) DUE TO OCCLUSION OF PRECEREBRAL ARTERY: Primary | ICD-10-CM

## 2023-06-27 DIAGNOSIS — R60.0 PERIPHERAL EDEMA: ICD-10-CM

## 2023-06-27 DIAGNOSIS — I10 PRIMARY HYPERTENSION: ICD-10-CM

## 2023-06-27 LAB
ANION GAP SERPL CALC-SCNC: 8 MMOL/L (ref 8–16)
BUN SERPL-MCNC: 24 MG/DL (ref 6–20)
CALCIUM SERPL-MCNC: 9.1 MG/DL (ref 8.7–10.5)
CHLORIDE SERPL-SCNC: 103 MMOL/L (ref 95–110)
CO2 SERPL-SCNC: 26 MMOL/L (ref 23–29)
CREAT SERPL-MCNC: 1.3 MG/DL (ref 0.5–1.4)
ERYTHROCYTE [DISTWIDTH] IN BLOOD BY AUTOMATED COUNT: 11.6 % (ref 11.5–14.5)
EST. GFR  (NO RACE VARIABLE): 51 ML/MIN/1.73 M^2
GLUCOSE SERPL-MCNC: 396 MG/DL (ref 70–110)
HCT VFR BLD AUTO: 30.4 % (ref 37–48.5)
HGB BLD-MCNC: 10.1 G/DL (ref 12–16)
MCH RBC QN AUTO: 29.1 PG (ref 27–31)
MCHC RBC AUTO-ENTMCNC: 33.2 G/DL (ref 32–36)
MCV RBC AUTO: 88 FL (ref 82–98)
PLATELET # BLD AUTO: 301 K/UL (ref 150–450)
PMV BLD AUTO: 10.8 FL (ref 9.2–12.9)
POTASSIUM SERPL-SCNC: 4.4 MMOL/L (ref 3.5–5.1)
RBC # BLD AUTO: 3.47 M/UL (ref 4–5.4)
SODIUM SERPL-SCNC: 137 MMOL/L (ref 136–145)
WBC # BLD AUTO: 5.49 K/UL (ref 3.9–12.7)

## 2023-06-27 PROCEDURE — 3008F PR BODY MASS INDEX (BMI) DOCUMENTED: ICD-10-PCS | Mod: CPTII,S$GLB,, | Performed by: INTERNAL MEDICINE

## 2023-06-27 PROCEDURE — 1159F MED LIST DOCD IN RCRD: CPT | Mod: CPTII,S$GLB,, | Performed by: INTERNAL MEDICINE

## 2023-06-27 PROCEDURE — 3077F PR MOST RECENT SYSTOLIC BLOOD PRESSURE >= 140 MM HG: ICD-10-PCS | Mod: CPTII,S$GLB,, | Performed by: INTERNAL MEDICINE

## 2023-06-27 PROCEDURE — 36415 COLL VENOUS BLD VENIPUNCTURE: CPT | Mod: PO | Performed by: INTERNAL MEDICINE

## 2023-06-27 PROCEDURE — 3078F PR MOST RECENT DIASTOLIC BLOOD PRESSURE < 80 MM HG: ICD-10-PCS | Mod: CPTII,S$GLB,, | Performed by: INTERNAL MEDICINE

## 2023-06-27 PROCEDURE — 85027 COMPLETE CBC AUTOMATED: CPT | Performed by: INTERNAL MEDICINE

## 2023-06-27 PROCEDURE — 99214 OFFICE O/P EST MOD 30 MIN: CPT | Mod: S$GLB,,, | Performed by: INTERNAL MEDICINE

## 2023-06-27 PROCEDURE — 3008F BODY MASS INDEX DOCD: CPT | Mod: CPTII,S$GLB,, | Performed by: INTERNAL MEDICINE

## 2023-06-27 PROCEDURE — 4010F PR ACE/ARB THEARPY RXD/TAKEN: ICD-10-PCS | Mod: CPTII,S$GLB,, | Performed by: INTERNAL MEDICINE

## 2023-06-27 PROCEDURE — 99214 PR OFFICE/OUTPT VISIT, EST, LEVL IV, 30-39 MIN: ICD-10-PCS | Mod: S$GLB,,, | Performed by: INTERNAL MEDICINE

## 2023-06-27 PROCEDURE — 99999 PR PBB SHADOW E&M-EST. PATIENT-LVL IV: ICD-10-PCS | Mod: PBBFAC,,, | Performed by: INTERNAL MEDICINE

## 2023-06-27 PROCEDURE — 3078F DIAST BP <80 MM HG: CPT | Mod: CPTII,S$GLB,, | Performed by: INTERNAL MEDICINE

## 2023-06-27 PROCEDURE — 80048 BASIC METABOLIC PNL TOTAL CA: CPT | Performed by: INTERNAL MEDICINE

## 2023-06-27 PROCEDURE — 3077F SYST BP >= 140 MM HG: CPT | Mod: CPTII,S$GLB,, | Performed by: INTERNAL MEDICINE

## 2023-06-27 PROCEDURE — 99999 PR PBB SHADOW E&M-EST. PATIENT-LVL IV: CPT | Mod: PBBFAC,,, | Performed by: INTERNAL MEDICINE

## 2023-06-27 PROCEDURE — 4010F ACE/ARB THERAPY RXD/TAKEN: CPT | Mod: CPTII,S$GLB,, | Performed by: INTERNAL MEDICINE

## 2023-06-27 PROCEDURE — 1159F PR MEDICATION LIST DOCUMENTED IN MEDICAL RECORD: ICD-10-PCS | Mod: CPTII,S$GLB,, | Performed by: INTERNAL MEDICINE

## 2023-06-27 NOTE — PROGRESS NOTES
Subjective:    Patient ID:  Mayra Davis is a 47 y.o. female patient here for evaluation Follow-up      History of Present Illness:  Cardiology follow-up.  Hypertensive cardiovascular disease, multidrug regime difficult to treat.  Past adverse reactions to both Diovan and hydralazine, Catapres.  Patient currently on minoxidil 5 b.i.d., carvedilol 37.5 b.i.d., Lasix 2-3 times per week.  Repeat blood pressure by me today 160 over 78.  Patient relatively asymptomatic.  Other concomitant medical problems include history of CVA treated last .  Known residual neurologic abnormalities.  Drugs at present, no oral anticoagulation.       No angina.  No PND orthopnea.  No significant edema.        Review of patient's allergies indicates:   Allergen Reactions    Vancomycin analogues Rash       Past Medical History:   Diagnosis Date    Diabetes mellitus     GERD (gastroesophageal reflux disease)     Hypertension     Legally blind      Past Surgical History:   Procedure Laterality Date     SECTION      Twice    HYSTERECTOMY      TUBAL LIGATION       Social History     Tobacco Use    Smoking status: Never    Smokeless tobacco: Never   Substance Use Topics    Alcohol use: Not Currently    Drug use: Never        Review of Systems:    As noted in HPI in addition      REVIEW OF SYSTEMS  Review of Systems   Constitutional: Negative for decreased appetite, diaphoresis, night sweats, weight gain and weight loss.   HENT:  Negative for nosebleeds and odynophagia.    Eyes:  Negative for double vision and photophobia.   Cardiovascular:  Positive for leg swelling. Negative for chest pain, claudication, cyanosis, dyspnea on exertion, irregular heartbeat, near-syncope, orthopnea, palpitations, paroxysmal nocturnal dyspnea and syncope.   Respiratory:  Negative for cough, hemoptysis, shortness of breath and wheezing.    Hematologic/Lymphatic: Negative for adenopathy.   Skin:  Negative for flushing, skin cancer and  suspicious lesions.   Musculoskeletal:  Negative for gout, myalgias and neck pain.   Gastrointestinal:  Negative for abdominal pain, heartburn, hematemesis and hematochezia.   Genitourinary:  Negative for bladder incontinence, hesitancy and nocturia.   Neurological:  Negative for focal weakness, headaches, light-headedness and paresthesias.   Psychiatric/Behavioral:  Negative for memory loss and substance abuse.             Objective:        Vitals:    06/27/23 1137   BP: (!) 151/68   Pulse: 79       Lab Results   Component Value Date    WBC 4.01 06/10/2023    HGB 8.8 (L) 06/10/2023    HCT 27.3 (L) 06/10/2023     06/10/2023    CHOL 155 08/31/2022    TRIG 154 (H) 08/31/2022    HDL 77 (H) 08/31/2022    ALT 58 (H) 06/10/2023    AST 44 (H) 06/10/2023     06/10/2023    K 3.9 06/10/2023     06/10/2023    CREATININE 1.3 06/10/2023    BUN 14 06/10/2023    CO2 27 06/10/2023    TSH 3.740 03/20/2023    INR 1.0 08/31/2022    HGBA1C 7.1 (H) 09/01/2022        ECHOCARDIOGRAM RESULTS  Results for orders placed during the hospital encounter of 09/01/22    Echo Saline Bubble? Yes    Interpretation Summary  · The left ventricle is normal in size with moderate concentric hypertrophy and normal systolic function.  · The estimated ejection fraction is 65%.  · Normal left ventricular diastolic function.  · Normal right ventricular size with normal right ventricular systolic function.  · No interatrial septal defect present.  · Mild tricuspid regurgitation.        CURRENT/PREVIOUS VISIT EKG  Results for orders placed or performed during the hospital encounter of 06/10/23   EKG 12-lead    Collection Time: 06/10/23  9:01 PM    Narrative    Test Reason : R07.9,    Vent. Rate : 077 BPM     Atrial Rate : 077 BPM     P-R Int : 196 ms          QRS Dur : 070 ms      QT Int : 396 ms       P-R-T Axes : 054 073 040 degrees     QTc Int : 448 ms    Normal sinus rhythm  Abnormal R wave progression  Abnormal ECG  When compared with ECG  "of 10-CAROL-2023 20:37,  No significant change was found  Confirmed by García DOMINGUEZ, Josue ([id number]) on 6/11/2023 6:08:31 PM    Referred By: [email or web address]   SELF           Confirmed By:Josue Mariano MD     No valid procedures specified.   Results for orders placed during the hospital encounter of 05/16/22    Nuclear Stress Test    Interpretation Summary    The EKG portion of this study is negative for ischemia.    There were no arrhythmias during stress.    No valid procedures specified.    PHYSICAL EXAM  CONSTITUTIONAL: Well built, well nourished in no apparent distress  NECK: no carotid bruit, no JVD  LUNGS: CTA  CHEST WALL: no tenderness,  HEART: regular rate and rhythm, S1, S2 normal, no murmur, click, rub or gallop   ABDOMEN: soft, non-tender; bowel sounds normal; no masses,  no organomegaly  EXTREMITIES: Extremities normal, no edema, no calf tenderness noted  NEURO: AAO X 3    I HAVE REVIEWED :    The vital signs, nurses notes, and all the pertinent radiology and labs.         Current Outpatient Medications   Medication Instructions    ALPRAZolam (XANAX) 1 mg, Oral, 3 times daily PRN    aspirin 81 mg, Oral, Daily    augmented betamethasone dipropionate (DIPROLENE-AF) 0.05 % cream APPLY TO SPOT ON BREAST TWICE DAILY    BD ULTRA-FINE MICRO PEN NEEDLE 32 gauge x 1/4" Ndle Subcutaneous    bismuth subsalicylate (BISMAREX) 262 mg Chew 2 tablets, Oral, 4 times daily    busPIRone (BUSPAR) 7.5 mg, Oral, 3 times daily    carvediloL (COREG) 37.5 mg, Oral, 2 times daily    clobetasoL (CLOBEX) 0.05 % shampoo Topical (Top), Daily    clopidogreL (PLAVIX) 75 mg, Oral, Daily    COMBIGAN 0.2-0.5 % Drop Both Eyes    doxepin (SINEQUAN) 10 MG capsule TAKE 1 CAPSULE BY MOUTH NIGHTLY AS NEEDED (FOR ITCHING). PLEASE STOP MEDICATION IF YOUR BLOOD PRESSURE INCREASES, YOU EXPERIENCE MUSCLE STIFFNESS OR OTHER SIDE EFFECT.    doxepin (ZONALON) 5 % cream Topical (Top), 4 times daily    fluocinolone (DERMA-SMOOTHE) 0.01 % external oil Topical (Top), 3 times " daily, Apply to scalp and ears twice per day    furosemide (LASIX) 40 MG tablet TAKE 1 TABLET BY MOUTH EVERY DAY    glipiZIDE (GLUCOTROL) 10 mg, Oral, 2 times daily    JANUMET XR  mg TM24 1 tablet, Oral, Daily    ketoconazole (NIZORAL) 2 % shampoo APPLY TWICE A WEEK LEAVE ON SCALP 10 - 15 MIN, THEN RINSE    lamoTRIgine 50 mg TR24 1 tablet, Oral, Daily    loratadine (CLARITIN) 10 mg, Oral, 4 times daily    metFORMIN (GLUCOPHAGE) 1,000 mg, Oral, 2 times daily with meals    metroNIDAZOLE (FLAGYL) 250 mg, Oral, 4 times daily    minoxidiL (LONITEN) 5 mg, Oral, 2 times daily    mometasone (ELOCON) 0.1 % solution APPLY TO AFFECTED AREA EVERY DAY    mometasone 0.1% (ELOCON) 0.1 % cream Topical (Top), Daily    omeprazole (PRILOSEC) 20 mg, Oral, Daily    OXcarbazepine (TRILEPTAL) 150 mg, Oral, 2 times daily    pantoprazole (PROTONIX) 40 mg, Oral, Daily    potassium chloride (MICRO-K) 10 MEQ CpSR 10 mEq, Oral, Daily    pravastatin (PRAVACHOL) 20 mg, Oral    prednisoLONE acetate (PRED FORTE) 1 % DrpS 1 drop, Both Eyes, 4 times daily    QUEtiapine (SEROQUEL) 25 mg, Oral, Nightly    sucralfate (CARAFATE) 1 gram tablet TAKE 1 TABLET (1 GM) BY MOUTH TWICE A DAY    tacrolimus (PROTOPIC) 0.1 % ointment Topical (Top), 2 times daily, Apply to the face twice per day    tetracycline (ACHROMYCIN,SUMYCIN) 500 mg, Oral, 4 times daily    timolol maleate 0.5% (TIMOPTIC) 0.5 % Drop PLACE ONE DROP IN BOTH EYES TWO TIMES DAILY    TRESIBA FLEXTOUCH U-100 100 unit/mL (3 mL) insulin pen Subcutaneous    triamcinolone acetonide 0.1% (KENALOG) 0.1 % ointment APPLY TO SCALP AND BEHIND EARS TWICE DAILY FOR ITCH    valsartan (DIOVAN) 160 mg, Oral, 2 times daily    VITAMIN D3 1,000 Units, Oral, Daily    zolpidem (AMBIEN CR) 12.5 mg, Oral          Assessment:   Hypertensive cardiovascular disease.  Multidrug regime as described in present illness.  Blood pressure borderline controlled.  Diabetes mellitus diet-controlled  Dyslipidemia  Remote history  of CVA treated last year with tPA, imaging studies negative.  No neurologic residual.  Diabetic retinopathy follows with ophthalmology.        Plan:   Continue Loniten 5 b.i.d., carvedilol 37.5 b.i.d., Lasix every other day.  Home blood pressure monitoring  6 weeks with labs.  Reassess.          No follow-ups on file.

## 2023-07-24 ENCOUNTER — TELEPHONE (OUTPATIENT)
Dept: ORTHOPEDICS | Facility: CLINIC | Age: 48
End: 2023-07-24
Payer: MEDICARE

## 2023-07-24 NOTE — TELEPHONE ENCOUNTER
Referral received and appt scheduled and confirmed. Pt has external MRI done. Pt advised to bring MRI disc to appt for review.

## 2023-07-28 RX ORDER — CARVEDILOL 25 MG/1
37.5 TABLET ORAL 2 TIMES DAILY
Qty: 90 TABLET | Refills: 11 | Status: SHIPPED | OUTPATIENT
Start: 2023-07-28 | End: 2023-11-02

## 2023-07-28 RX ORDER — MINOXIDIL 2.5 MG/1
5 TABLET ORAL 2 TIMES DAILY
Qty: 120 TABLET | Refills: 11 | Status: SHIPPED | OUTPATIENT
Start: 2023-07-28 | End: 2024-07-27

## 2023-07-28 RX ORDER — FUROSEMIDE 40 MG/1
40 TABLET ORAL DAILY
Qty: 90 TABLET | Refills: 3 | Status: SHIPPED | OUTPATIENT
Start: 2023-07-28 | End: 2024-01-10 | Stop reason: SDUPTHER

## 2023-07-28 NOTE — TELEPHONE ENCOUNTER
----- Message from Laya Glez MA sent at 7/28/2023 12:54 PM CDT -----  Type: Needs Medical Advice  Who Called:  pt  Pharmacy name and phone #:  [id number] - [place] - [id number] [facility] [place] [address]  Phone: [phone] Fax: [phone]  Best Call Back Number: [phone]  Additional Information: pt states provider never written the medication for her after he told her he would . The pt has been doubling up on old medication and is now out of blood pressure medication please advise

## 2023-08-01 ENCOUNTER — OFFICE VISIT (OUTPATIENT)
Dept: ORTHOPEDICS | Facility: CLINIC | Age: 48
End: 2023-08-01
Payer: MEDICARE

## 2023-08-01 VITALS — WEIGHT: 168 LBS | RESPIRATION RATE: 16 BRPM | HEIGHT: 64 IN | BODY MASS INDEX: 28.68 KG/M2

## 2023-08-01 DIAGNOSIS — S46.012A TRAUMATIC COMPLETE TEAR OF LEFT ROTATOR CUFF, INITIAL ENCOUNTER: Primary | ICD-10-CM

## 2023-08-01 PROCEDURE — 1159F PR MEDICATION LIST DOCUMENTED IN MEDICAL RECORD: ICD-10-PCS | Mod: CPTII,S$GLB,, | Performed by: ORTHOPAEDIC SURGERY

## 2023-08-01 PROCEDURE — 4010F PR ACE/ARB THEARPY RXD/TAKEN: ICD-10-PCS | Mod: CPTII,S$GLB,, | Performed by: ORTHOPAEDIC SURGERY

## 2023-08-01 PROCEDURE — 4010F ACE/ARB THERAPY RXD/TAKEN: CPT | Mod: CPTII,S$GLB,, | Performed by: ORTHOPAEDIC SURGERY

## 2023-08-01 PROCEDURE — 99204 OFFICE O/P NEW MOD 45 MIN: CPT | Mod: S$GLB,,, | Performed by: ORTHOPAEDIC SURGERY

## 2023-08-01 PROCEDURE — 99204 PR OFFICE/OUTPT VISIT, NEW, LEVL IV, 45-59 MIN: ICD-10-PCS | Mod: S$GLB,,, | Performed by: ORTHOPAEDIC SURGERY

## 2023-08-01 PROCEDURE — 99999 PR PBB SHADOW E&M-EST. PATIENT-LVL IV: CPT | Mod: PBBFAC,,, | Performed by: ORTHOPAEDIC SURGERY

## 2023-08-01 PROCEDURE — 3008F BODY MASS INDEX DOCD: CPT | Mod: CPTII,S$GLB,, | Performed by: ORTHOPAEDIC SURGERY

## 2023-08-01 PROCEDURE — 1159F MED LIST DOCD IN RCRD: CPT | Mod: CPTII,S$GLB,, | Performed by: ORTHOPAEDIC SURGERY

## 2023-08-01 PROCEDURE — 3008F PR BODY MASS INDEX (BMI) DOCUMENTED: ICD-10-PCS | Mod: CPTII,S$GLB,, | Performed by: ORTHOPAEDIC SURGERY

## 2023-08-01 PROCEDURE — 99999 PR PBB SHADOW E&M-EST. PATIENT-LVL IV: ICD-10-PCS | Mod: PBBFAC,,, | Performed by: ORTHOPAEDIC SURGERY

## 2023-08-01 RX ORDER — ATROPINE SULFATE 10 MG/ML
1 SOLUTION/ DROPS OPHTHALMIC 2 TIMES DAILY
COMMUNITY
Start: 2023-07-14

## 2023-08-01 RX ORDER — NAPROXEN 500 MG/1
500 TABLET ORAL 2 TIMES DAILY
COMMUNITY
Start: 2023-06-06

## 2023-08-01 RX ORDER — MINOCYCLINE HYDROCHLORIDE 50 MG/1
50 CAPSULE ORAL 2 TIMES DAILY
COMMUNITY
Start: 2023-07-10

## 2023-08-01 NOTE — PROGRESS NOTES
"Patient ID: Mayra Davis is a 47 y.o. female    Chief Complaint:   Chief Complaint   Patient presents with    Left Shoulder - Pain, Injury, Initial Visit       History of Present Illness:    Pleasant 47-year-old female with history of diabetes and history of stroke who presents for evaluation of left shoulder pain.  She reports 2 month history of left shoulder pain which is not improving.  She would an injury to her left shoulder and since that time has had pain and dysfunction with overhead activity as well as nighttime pain.  She is not had any therapy or or injections.    PAST MEDICAL HISTORY:   Past Medical History:   Diagnosis Date    Diabetes mellitus     GERD (gastroesophageal reflux disease)     Hypertension     Legally blind      PAST SURGICAL HISTORY:   Past Surgical History:   Procedure Laterality Date     SECTION      Twice    HYSTERECTOMY      TUBAL LIGATION       FAMILY HISTORY: History reviewed. No pertinent family history.  SOCIAL HISTORY:   Social History     Occupational History    Not on file   Tobacco Use    Smoking status: Never    Smokeless tobacco: Never   Substance and Sexual Activity    Alcohol use: Not Currently    Drug use: Never    Sexual activity: Yes        MEDICATIONS:   Current Outpatient Medications:     ALPRAZolam (XANAX) 1 MG tablet, Take 1 mg by mouth 3 (three) times daily as needed., Disp: , Rfl:     aspirin 81 MG Chew, Take 1 tablet (81 mg total) by mouth once daily., Disp: 90 tablet, Rfl: 3    atropine 1% (ISOPTO ATROPINE) 1 % Drop, Place 1 drop into the right eye 2 (two) times daily., Disp: , Rfl:     augmented betamethasone dipropionate (DIPROLENE-AF) 0.05 % cream, APPLY TO SPOT ON BREAST TWICE DAILY, Disp: , Rfl:     BD ULTRA-FINE MICRO PEN NEEDLE 32 gauge x 1/4" Ndle, Inject into the skin., Disp: , Rfl:     bismuth subsalicylate (BISMAREX) 262 mg Chew, Take 2 tablets by mouth 4 (four) times daily., Disp: , Rfl:     busPIRone (BUSPAR) 7.5 MG tablet, Take 7.5 mg " by mouth 3 (three) times daily., Disp: , Rfl:     carvediloL (COREG) 25 MG tablet, Take 1.5 tablets (37.5 mg total) by mouth 2 (two) times daily., Disp: 90 tablet, Rfl: 11    clobetasoL (CLOBEX) 0.05 % shampoo, Apply topically once daily., Disp: 118 mL, Rfl: 3    clopidogreL (PLAVIX) 75 mg tablet, Take 1 tablet (75 mg total) by mouth once daily., Disp: 30 tablet, Rfl: 11    COMBIGAN 0.2-0.5 % Drop, Place into both eyes., Disp: , Rfl:     doxepin (SINEQUAN) 10 MG capsule, TAKE 1 CAPSULE BY MOUTH NIGHTLY AS NEEDED (FOR ITCHING). PLEASE STOP MEDICATION IF YOUR BLOOD PRESSURE INCREASES, YOU EXPERIENCE MUSCLE STIFFNESS OR OTHER SIDE EFFECT., Disp: 30 capsule, Rfl: 1    doxepin (ZONALON) 5 % cream, Apply topically 4 (four) times daily., Disp: 45 g, Rfl: 3    fluocinolone (DERMA-SMOOTHE) 0.01 % external oil, Apply topically 3 (three) times daily. Apply to scalp and ears twice per day, Disp: 118 mL, Rfl: 3    furosemide (LASIX) 40 MG tablet, Take 1 tablet (40 mg total) by mouth once daily., Disp: 90 tablet, Rfl: 3    glipiZIDE (GLUCOTROL) 10 MG tablet, Take 10 mg by mouth 2 (two) times daily., Disp: , Rfl:     JANUMET XR  mg TM24, Take 1 tablet by mouth once daily., Disp: , Rfl:     ketoconazole (NIZORAL) 2 % shampoo, APPLY TWICE A WEEK LEAVE ON SCALP 10 - 15 MIN, THEN RINSE, Disp: , Rfl:     lamoTRIgine 50 mg TR24, Take 1 tablet by mouth once daily., Disp: , Rfl:     loratadine (CLARITIN) 10 mg tablet, Take 1 tablet (10 mg total) by mouth 4 (four) times daily., Disp: 120 tablet, Rfl: 4    metroNIDAZOLE (FLAGYL) 250 MG tablet, Take 250 mg by mouth 4 (four) times daily., Disp: , Rfl:     minocycline (MINOCIN,DYNACIN) 50 MG Cap, Take 50 mg by mouth 2 (two) times daily., Disp: , Rfl:     minoxidiL (LONITEN) 2.5 MG tablet, Take 2 tablets (5 mg total) by mouth 2 (two) times daily., Disp: 120 tablet, Rfl: 11    mometasone (ELOCON) 0.1 % solution, APPLY TO AFFECTED AREA EVERY DAY, Disp: 60 mL, Rfl: 1    mometasone 0.1%  (ELOCON) 0.1 % cream, Apply topically once daily., Disp: 45 g, Rfl: 1    naproxen (NAPROSYN) 500 MG tablet, Take 500 mg by mouth 2 (two) times daily., Disp: , Rfl:     omeprazole (PRILOSEC) 20 MG capsule, Take 20 mg by mouth once daily., Disp: , Rfl:     OXcarbazepine (TRILEPTAL) 150 MG Tab, Take 150 mg by mouth 2 (two) times daily., Disp: , Rfl:     pantoprazole (PROTONIX) 40 MG tablet, Take 40 mg by mouth once daily., Disp: , Rfl:     potassium chloride (MICRO-K) 10 MEQ CpSR, Take 10 mEq by mouth once daily., Disp: , Rfl:     pravastatin (PRAVACHOL) 20 MG tablet, Take 20 mg by mouth., Disp: , Rfl:     prednisoLONE acetate (PRED FORTE) 1 % DrpS, Place 1 drop into both eyes 4 (four) times daily., Disp: , Rfl:     QUEtiapine (SEROQUEL) 25 MG Tab, Take 25 mg by mouth nightly., Disp: , Rfl:     sucralfate (CARAFATE) 1 gram tablet, TAKE 1 TABLET (1 GM) BY MOUTH TWICE A DAY, Disp: , Rfl:     tacrolimus (PROTOPIC) 0.1 % ointment, Apply topically 2 (two) times daily. Apply to the face twice per day, Disp: 100 g, Rfl: 3    tetracycline (ACHROMYCIN,SUMYCIN) 500 MG capsule, Take 500 mg by mouth 4 (four) times daily., Disp: , Rfl:     timolol maleate 0.5% (TIMOPTIC) 0.5 % Drop, PLACE ONE DROP IN BOTH EYES TWO TIMES DAILY, Disp: , Rfl:     TRESIBA FLEXTOUCH U-100 100 unit/mL (3 mL) insulin pen, Inject into the skin., Disp: , Rfl:     triamcinolone acetonide 0.1% (KENALOG) 0.1 % ointment, APPLY TO SCALP AND BEHIND EARS TWICE DAILY FOR ITCH, Disp: , Rfl:     valsartan (DIOVAN) 160 MG tablet, Take 1 tablet (160 mg total) by mouth 2 (two) times daily., Disp: 180 tablet, Rfl: 3    VITAMIN D3 25 mcg (1,000 unit) tablet, Take 1,000 Units by mouth once daily., Disp: , Rfl:     zolpidem (AMBIEN CR) 12.5 MG CR tablet, Take 12.5 mg by mouth., Disp: , Rfl:     metFORMIN (GLUCOPHAGE) 500 MG tablet, Take 2 tablets (1,000 mg total) by mouth 2 (two) times daily with meals., Disp: 120 tablet, Rfl: 2  ALLERGIES:   Review of patient's allergies  indicates:   Allergen Reactions    Trazodone Other (See Comments)     Pt says this medication gives her nightmares.     Vancomycin analogues Rash         Physical Exam     Vitals:    08/01/23 1335   Resp: 16     Alert and oriented to person, place and time. No acute distress. Well-groomed, not ill appearing. Pupils round and reactive, normal respiratory effort, no audible wheezing.     Shoulder  Exam    OBSERVATION:     Swelling  none     Discoloration  none      Scars   none     Deformity  None    TENDERNESS                Clavicle   negative         AC Jt.    negative               Acromion:  negative        Scapular Spine negative   Supraspinatus  positive       Infraspinatus  negative   LH Biceps   positive   Greater Tub.  Positive        ROM:               Forward Flexion  140°       ER at 0°    60°        ER at 90° ABD  90°       IR at 90°  ABD   NA         IR (spine level)   L2        STRENGTH:       SCAPTION   4+/5        IR    5/5       ER    4+/5       BICEPS   5/5            SIGNS:          NEER   +     FISHMAN   +        DROP ARM   Neg   LINETTE's   +    BELLY PRESS Neg   O'AL's  Neg    SPEEDs  +   LIFT-OFF  Neg   X-Body ADD    Neg                  Imaging:       X-Ray: I have reviewed all pertinent results/findings and my personal findings are:  No significant evidence of fracture or dislocation.  No degenerative changes.  MRI: I have reviewed all pertinent results/findings and my personal findings are:  Near full-thickness tear of the supraspinatus without any significant retraction.  No significant atrophy.  Degenerative SLAP tear.      Assessment & Plan    Traumatic complete tear of left rotator cuff, initial encounter  -     Ambulatory referral/consult to Physical/Occupational Therapy; Future; Expected date: 08/08/2023         Treatment options were discussed with her in detail.  I went over her x-rays and MRI which do show rotator cuff tear of the left shoulder.  We discussed operative and  nonoperative treatment.  Operatively we discussed rotator cuff repair arthroscopically and possible biceps tenodesis with indicated procedures.  Non operatively we discussed a trial of formal physical therapy to see if this improves her symptoms and close follow-up.  She would like to try a trial of formal PT.  She does have diabetes and hypertension with history of stroke in the past and would be somewhat high risk for surgery although she does state this is controlled as of late.    Follow up:  6 weeks  X-rays next visit:  None

## 2023-08-09 ENCOUNTER — OFFICE VISIT (OUTPATIENT)
Dept: CARDIOLOGY | Facility: CLINIC | Age: 48
End: 2023-08-09
Payer: MEDICARE

## 2023-08-09 VITALS
BODY MASS INDEX: 28.42 KG/M2 | WEIGHT: 166.44 LBS | HEIGHT: 64 IN | DIASTOLIC BLOOD PRESSURE: 93 MMHG | HEART RATE: 71 BPM | SYSTOLIC BLOOD PRESSURE: 212 MMHG

## 2023-08-09 DIAGNOSIS — I63.20 CEREBROVASCULAR ACCIDENT (CVA) DUE TO OCCLUSION OF PRECEREBRAL ARTERY: Primary | ICD-10-CM

## 2023-08-09 DIAGNOSIS — I10 PRIMARY HYPERTENSION: Primary | ICD-10-CM

## 2023-08-09 DIAGNOSIS — I10 PRIMARY HYPERTENSION: ICD-10-CM

## 2023-08-09 PROCEDURE — 1159F PR MEDICATION LIST DOCUMENTED IN MEDICAL RECORD: ICD-10-PCS | Mod: CPTII,S$GLB,, | Performed by: INTERNAL MEDICINE

## 2023-08-09 PROCEDURE — 99214 OFFICE O/P EST MOD 30 MIN: CPT | Mod: S$GLB,,, | Performed by: INTERNAL MEDICINE

## 2023-08-09 PROCEDURE — 4010F PR ACE/ARB THEARPY RXD/TAKEN: ICD-10-PCS | Mod: CPTII,S$GLB,, | Performed by: INTERNAL MEDICINE

## 2023-08-09 PROCEDURE — 3080F PR MOST RECENT DIASTOLIC BLOOD PRESSURE >= 90 MM HG: ICD-10-PCS | Mod: CPTII,S$GLB,, | Performed by: INTERNAL MEDICINE

## 2023-08-09 PROCEDURE — 3080F DIAST BP >= 90 MM HG: CPT | Mod: CPTII,S$GLB,, | Performed by: INTERNAL MEDICINE

## 2023-08-09 PROCEDURE — 99999 PR PBB SHADOW E&M-EST. PATIENT-LVL IV: ICD-10-PCS | Mod: PBBFAC,,, | Performed by: INTERNAL MEDICINE

## 2023-08-09 PROCEDURE — 1159F MED LIST DOCD IN RCRD: CPT | Mod: CPTII,S$GLB,, | Performed by: INTERNAL MEDICINE

## 2023-08-09 PROCEDURE — 3008F BODY MASS INDEX DOCD: CPT | Mod: CPTII,S$GLB,, | Performed by: INTERNAL MEDICINE

## 2023-08-09 PROCEDURE — 4010F ACE/ARB THERAPY RXD/TAKEN: CPT | Mod: CPTII,S$GLB,, | Performed by: INTERNAL MEDICINE

## 2023-08-09 PROCEDURE — 99999 PR PBB SHADOW E&M-EST. PATIENT-LVL IV: CPT | Mod: PBBFAC,,, | Performed by: INTERNAL MEDICINE

## 2023-08-09 PROCEDURE — 99214 PR OFFICE/OUTPT VISIT, EST, LEVL IV, 30-39 MIN: ICD-10-PCS | Mod: S$GLB,,, | Performed by: INTERNAL MEDICINE

## 2023-08-09 PROCEDURE — 3077F SYST BP >= 140 MM HG: CPT | Mod: CPTII,S$GLB,, | Performed by: INTERNAL MEDICINE

## 2023-08-09 PROCEDURE — 3077F PR MOST RECENT SYSTOLIC BLOOD PRESSURE >= 140 MM HG: ICD-10-PCS | Mod: CPTII,S$GLB,, | Performed by: INTERNAL MEDICINE

## 2023-08-09 PROCEDURE — 3008F PR BODY MASS INDEX (BMI) DOCUMENTED: ICD-10-PCS | Mod: CPTII,S$GLB,, | Performed by: INTERNAL MEDICINE

## 2023-08-09 RX ORDER — LOSARTAN POTASSIUM AND HYDROCHLOROTHIAZIDE 25; 100 MG/1; MG/1
1 TABLET ORAL DAILY
Qty: 90 TABLET | Refills: 3 | Status: SHIPPED | OUTPATIENT
Start: 2023-08-09

## 2023-08-09 RX ORDER — LOSARTAN POTASSIUM AND HYDROCHLOROTHIAZIDE 25; 100 MG/1; MG/1
1 TABLET ORAL DAILY
COMMUNITY
End: 2023-08-09 | Stop reason: SDUPTHER

## 2023-08-09 NOTE — PROGRESS NOTES
Subjective:    Patient ID:  Mayra Davis is a 47 y.o. female patient here for evaluation Follow-up      History of Present Illness:  Follow-up, hypertensive cardiovascular disease.  Multidrug regime, difficult to treat, intermittent compliance.  Last visit patient was taken minoxidil 5 b.i.d., carvedilol 37.5 b.i.d. Lasix to 3 times a week.  Currently off meds due swelling, blood pressure elevated today.  Denies headache chest pain shortness of breath.    Remote history of CVA trivial last September Rtpa  no significant residual neurologic deficits.  No anticoagulation.             Review of patient's allergies indicates:   Allergen Reactions    Trazodone Other (See Comments)     Pt says this medication gives her nightmares.     Vancomycin analogues Rash       Past Medical History:   Diagnosis Date    Diabetes mellitus     GERD (gastroesophageal reflux disease)     Hypertension     Legally blind      Past Surgical History:   Procedure Laterality Date     SECTION      Twice    HYSTERECTOMY      TUBAL LIGATION       Social History     Tobacco Use    Smoking status: Never    Smokeless tobacco: Never   Substance Use Topics    Alcohol use: Not Currently    Drug use: Never        Review of Systems:    As noted in HPI in addition      REVIEW OF SYSTEMS  Review of Systems   Constitutional: Negative for decreased appetite, diaphoresis, night sweats, weight gain and weight loss.   HENT:  Negative for nosebleeds and odynophagia.    Eyes:  Negative for double vision and photophobia.   Cardiovascular:  Negative for chest pain, claudication, cyanosis, dyspnea on exertion, irregular heartbeat, leg swelling, near-syncope, orthopnea, palpitations, paroxysmal nocturnal dyspnea and syncope.   Respiratory:  Negative for cough, hemoptysis, shortness of breath and wheezing.    Hematologic/Lymphatic: Negative for adenopathy.   Skin:  Negative for flushing, skin cancer and suspicious lesions.   Musculoskeletal:  Negative  for gout, myalgias and neck pain.   Gastrointestinal:  Negative for abdominal pain, heartburn, hematemesis and hematochezia.   Genitourinary:  Negative for bladder incontinence, hesitancy and nocturia.   Neurological:  Negative for focal weakness, headaches, light-headedness and paresthesias.   Psychiatric/Behavioral:  Negative for memory loss and substance abuse.               Objective:        Vitals:    08/09/23 0935   BP: (!) 212/93   Pulse: 71       Lab Results   Component Value Date    WBC 5.49 06/27/2023    HGB 10.1 (L) 06/27/2023    HCT 30.4 (L) 06/27/2023     06/27/2023    CHOL 155 08/31/2022    TRIG 154 (H) 08/31/2022    HDL 77 (H) 08/31/2022    ALT 58 (H) 06/10/2023    AST 44 (H) 06/10/2023     06/27/2023    K 4.4 06/27/2023     06/27/2023    CREATININE 1.3 06/27/2023    BUN 24 (H) 06/27/2023    CO2 26 06/27/2023    TSH 3.740 03/20/2023    INR 1.0 08/31/2022    HGBA1C 7.1 (H) 09/01/2022        ECHOCARDIOGRAM RESULTS  Results for orders placed during the hospital encounter of 09/01/22    Echo Saline Bubble? Yes    Interpretation Summary  · The left ventricle is normal in size with moderate concentric hypertrophy and normal systolic function.  · The estimated ejection fraction is 65%.  · Normal left ventricular diastolic function.  · Normal right ventricular size with normal right ventricular systolic function.  · No interatrial septal defect present.  · Mild tricuspid regurgitation.        CURRENT/PREVIOUS VISIT EKG  Results for orders placed or performed during the hospital encounter of 06/10/23   EKG 12-lead    Collection Time: 06/10/23  9:01 PM    Narrative    Test Reason : R07.9,    Vent. Rate : 077 BPM     Atrial Rate : 077 BPM     P-R Int : 196 ms          QRS Dur : 070 ms      QT Int : 396 ms       P-R-T Axes : 054 073 040 degrees     QTc Int : 448 ms    Normal sinus rhythm  Abnormal R wave progression  Abnormal ECG  When compared with ECG of 10-CAROL-2023 20:37,  No significant  "change was found  Confirmed by Josue Mariano MD ([id number]) on 6/11/2023 6:08:31 PM    Referred By: [email or web address]   SELF           Confirmed By:Josue Mariano MD     No valid procedures specified.   Results for orders placed during the hospital encounter of 05/16/22    Nuclear Stress Test    Interpretation Summary    The EKG portion of this study is negative for ischemia.    There were no arrhythmias during stress.    No valid procedures specified.    PHYSICAL EXAM  CONSTITUTIONAL: Well built, well nourished in no apparent distress  NECK: no carotid bruit, no JVD  LUNGS: CTA  CHEST WALL: no tenderness,  HEART: regular rate and rhythm, S1, S2 normal, no murmur, click, rub or gallop   ABDOMEN: soft, non-tender; bowel sounds normal; no masses,  no organomegaly  EXTREMITIES: Extremities normal, no edema, no calf tenderness noted  NEURO: AAO X 3    I HAVE REVIEWED :    The vital signs, nurses notes, and all the pertinent radiology and labs.         Current Outpatient Medications   Medication Instructions    ALPRAZolam (XANAX) 1 mg, Oral, 3 times daily PRN    aspirin 81 mg, Oral, Daily    atropine 1% (ISOPTO ATROPINE) 1 % Drop 1 drop, Right Eye, 2 times daily    augmented betamethasone dipropionate (DIPROLENE-AF) 0.05 % cream APPLY TO SPOT ON BREAST TWICE DAILY    BD ULTRA-FINE MICRO PEN NEEDLE 32 gauge x 1/4" Ndle Subcutaneous    bismuth subsalicylate (BISMAREX) 262 mg Chew 2 tablets, Oral, 4 times daily    busPIRone (BUSPAR) 7.5 mg, Oral, 3 times daily    carvediloL (COREG) 37.5 mg, Oral, 2 times daily    clobetasoL (CLOBEX) 0.05 % shampoo Topical (Top), Daily    clopidogreL (PLAVIX) 75 mg, Oral, Daily    COMBIGAN 0.2-0.5 % Drop Both Eyes    doxepin (SINEQUAN) 10 MG capsule TAKE 1 CAPSULE BY MOUTH NIGHTLY AS NEEDED (FOR ITCHING). PLEASE STOP MEDICATION IF YOUR BLOOD PRESSURE INCREASES, YOU EXPERIENCE MUSCLE STIFFNESS OR OTHER SIDE EFFECT.    doxepin (ZONALON) 5 % cream Topical (Top), 4 times daily    fluocinolone (DERMA-SMOOTHE) 0.01 " % external oil Topical (Top), 3 times daily, Apply to scalp and ears twice per day    furosemide (LASIX) 40 mg, Oral, Daily    glipiZIDE (GLUCOTROL) 10 mg, Oral, 2 times daily    JANUMET XR  mg TM24 1 tablet, Oral, Daily    ketoconazole (NIZORAL) 2 % shampoo APPLY TWICE A WEEK LEAVE ON SCALP 10 - 15 MIN, THEN RINSE    lamoTRIgine 50 mg TR24 1 tablet, Oral, Daily    loratadine (CLARITIN) 10 mg, Oral, 4 times daily    metFORMIN (GLUCOPHAGE) 1,000 mg, Oral, 2 times daily with meals    metroNIDAZOLE (FLAGYL) 250 mg, Oral, 4 times daily    minocycline (MINOCIN,DYNACIN) 50 mg, Oral, 2 times daily    minoxidiL (LONITEN) 5 mg, Oral, 2 times daily    mometasone (ELOCON) 0.1 % solution APPLY TO AFFECTED AREA EVERY DAY    mometasone 0.1% (ELOCON) 0.1 % cream Topical (Top), Daily    naproxen (NAPROSYN) 500 mg, Oral, 2 times daily    omeprazole (PRILOSEC) 20 mg, Oral, Daily    OXcarbazepine (TRILEPTAL) 150 mg, Oral, 2 times daily    pantoprazole (PROTONIX) 40 mg, Oral, Daily    potassium chloride (MICRO-K) 10 MEQ CpSR 10 mEq, Oral, Daily    pravastatin (PRAVACHOL) 20 mg, Oral    prednisoLONE acetate (PRED FORTE) 1 % DrpS 1 drop, Both Eyes, 4 times daily    QUEtiapine (SEROQUEL) 25 mg, Oral, Nightly    sucralfate (CARAFATE) 1 gram tablet TAKE 1 TABLET (1 GM) BY MOUTH TWICE A DAY    tacrolimus (PROTOPIC) 0.1 % ointment Topical (Top), 2 times daily, Apply to the face twice per day    tetracycline (ACHROMYCIN,SUMYCIN) 500 mg, Oral, 4 times daily    timolol maleate 0.5% (TIMOPTIC) 0.5 % Drop PLACE ONE DROP IN BOTH EYES TWO TIMES DAILY    TRESIBA FLEXTOUCH U-100 100 unit/mL (3 mL) insulin pen Subcutaneous    triamcinolone acetonide 0.1% (KENALOG) 0.1 % ointment APPLY TO SCALP AND BEHIND EARS TWICE DAILY FOR ITCH    valsartan (DIOVAN) 160 mg, Oral, 2 times daily    VITAMIN D3 1,000 Units, Oral, Daily    zolpidem (AMBIEN CR) 12.5 mg, Oral          Assessment:   Declines minoxidil, declines Lasix.  Adverse reactions in the past  with Diovan hydralazine and Catapres.        Plan:   Begin Hyzaar 100/25 daily.  Basic metabolic profile in 1 week.  Return to clinic in 1 month.          No follow-ups on file.

## 2023-08-16 ENCOUNTER — TELEPHONE (OUTPATIENT)
Dept: CARDIOLOGY | Facility: CLINIC | Age: 48
End: 2023-08-16
Payer: MEDICARE

## 2023-08-16 ENCOUNTER — LAB VISIT (OUTPATIENT)
Dept: LAB | Facility: HOSPITAL | Age: 48
End: 2023-08-16
Attending: INTERNAL MEDICINE
Payer: MEDICARE

## 2023-08-16 DIAGNOSIS — I63.20 CEREBROVASCULAR ACCIDENT (CVA) DUE TO OCCLUSION OF PRECEREBRAL ARTERY: ICD-10-CM

## 2023-08-16 DIAGNOSIS — I10 PRIMARY HYPERTENSION: ICD-10-CM

## 2023-08-16 LAB
ANION GAP SERPL CALC-SCNC: 6 MMOL/L (ref 8–16)
BUN SERPL-MCNC: 23 MG/DL (ref 6–20)
CALCIUM SERPL-MCNC: 9 MG/DL (ref 8.7–10.5)
CHLORIDE SERPL-SCNC: 102 MMOL/L (ref 95–110)
CO2 SERPL-SCNC: 26 MMOL/L (ref 23–29)
CREAT SERPL-MCNC: 1.3 MG/DL (ref 0.5–1.4)
EST. GFR  (NO RACE VARIABLE): 51 ML/MIN/1.73 M^2
GLUCOSE SERPL-MCNC: 487 MG/DL (ref 70–110)
POTASSIUM SERPL-SCNC: 4.6 MMOL/L (ref 3.5–5.1)
SODIUM SERPL-SCNC: 134 MMOL/L (ref 136–145)

## 2023-08-16 PROCEDURE — 80048 BASIC METABOLIC PNL TOTAL CA: CPT | Performed by: INTERNAL MEDICINE

## 2023-08-16 PROCEDURE — 36415 COLL VENOUS BLD VENIPUNCTURE: CPT | Performed by: INTERNAL MEDICINE

## 2023-08-16 NOTE — TELEPHONE ENCOUNTER
Received call from Rosemary with main campus lab with critical lab results. Pt's glucose [phone]. Notified MD by phone. Per Dr Cervantes forward to PCP. Lab results faxed to PCP on chart. Unable to contact patient. Left VM on spouse's phone as patients number not reachable.

## 2023-08-21 ENCOUNTER — PATIENT MESSAGE (OUTPATIENT)
Dept: FAMILY MEDICINE | Facility: CLINIC | Age: 48
End: 2023-08-21
Payer: MEDICARE

## 2023-08-21 ENCOUNTER — TELEPHONE (OUTPATIENT)
Dept: FAMILY MEDICINE | Facility: CLINIC | Age: 48
End: 2023-08-21
Payer: MEDICARE

## 2023-08-21 NOTE — TELEPHONE ENCOUNTER
Tried calling  pt about Dermatology appt p/Referral  Phone number not reachable, Info sent to pt portal

## 2023-08-25 ENCOUNTER — TELEPHONE (OUTPATIENT)
Dept: ORTHOPEDICS | Facility: CLINIC | Age: 48
End: 2023-08-25
Payer: MEDICARE

## 2023-08-25 NOTE — TELEPHONE ENCOUNTER
----- Message from Teddy Wall MA sent at 8/25/2023  3:59 PM CDT -----  Contact: patient  Patient called in and stated she needs to speak to office about which PT companies she can go to for her left shoulder.    Call back number is [phone]

## 2023-08-28 PROBLEM — I63.20 CEREBROVASCULAR ACCIDENT (CVA) DUE TO OCCLUSION OF PRECEREBRAL ARTERY: Status: RESOLVED | Noted: 2022-09-01 | Resolved: 2023-08-28

## 2023-09-12 ENCOUNTER — OFFICE VISIT (OUTPATIENT)
Dept: ORTHOPEDICS | Facility: CLINIC | Age: 48
End: 2023-09-12
Payer: MEDICARE

## 2023-09-12 VITALS — RESPIRATION RATE: 16 BRPM | BODY MASS INDEX: 28.34 KG/M2 | HEIGHT: 64 IN | WEIGHT: 166 LBS

## 2023-09-12 DIAGNOSIS — S46.012A TRAUMATIC COMPLETE TEAR OF LEFT ROTATOR CUFF, INITIAL ENCOUNTER: Primary | ICD-10-CM

## 2023-09-12 PROCEDURE — 99214 PR OFFICE/OUTPT VISIT, EST, LEVL IV, 30-39 MIN: ICD-10-PCS | Mod: S$GLB,,, | Performed by: ORTHOPAEDIC SURGERY

## 2023-09-12 PROCEDURE — 4010F PR ACE/ARB THEARPY RXD/TAKEN: ICD-10-PCS | Mod: CPTII,S$GLB,, | Performed by: ORTHOPAEDIC SURGERY

## 2023-09-12 PROCEDURE — 1159F MED LIST DOCD IN RCRD: CPT | Mod: CPTII,S$GLB,, | Performed by: ORTHOPAEDIC SURGERY

## 2023-09-12 PROCEDURE — 3008F BODY MASS INDEX DOCD: CPT | Mod: CPTII,S$GLB,, | Performed by: ORTHOPAEDIC SURGERY

## 2023-09-12 PROCEDURE — 99999 PR PBB SHADOW E&M-EST. PATIENT-LVL IV: CPT | Mod: PBBFAC,,, | Performed by: ORTHOPAEDIC SURGERY

## 2023-09-12 PROCEDURE — 1159F PR MEDICATION LIST DOCUMENTED IN MEDICAL RECORD: ICD-10-PCS | Mod: CPTII,S$GLB,, | Performed by: ORTHOPAEDIC SURGERY

## 2023-09-12 PROCEDURE — 3008F PR BODY MASS INDEX (BMI) DOCUMENTED: ICD-10-PCS | Mod: CPTII,S$GLB,, | Performed by: ORTHOPAEDIC SURGERY

## 2023-09-12 PROCEDURE — 4010F ACE/ARB THERAPY RXD/TAKEN: CPT | Mod: CPTII,S$GLB,, | Performed by: ORTHOPAEDIC SURGERY

## 2023-09-12 PROCEDURE — 99999 PR PBB SHADOW E&M-EST. PATIENT-LVL IV: ICD-10-PCS | Mod: PBBFAC,,, | Performed by: ORTHOPAEDIC SURGERY

## 2023-09-12 PROCEDURE — 99214 OFFICE O/P EST MOD 30 MIN: CPT | Mod: S$GLB,,, | Performed by: ORTHOPAEDIC SURGERY

## 2023-09-12 NOTE — PROGRESS NOTES
Patient ID: Mayra Davis is a 47 y.o. female    Chief Complaint:   Chief Complaint   Patient presents with    Right Shoulder - Pain       History of Present Illness:    Pleasant 47-year-old female with history of diabetes and history of stroke who presents for evaluation of left shoulder pain.  She reports 2 month history of left shoulder pain which is not improving.  She would an injury to her left shoulder and since that time has had pain and dysfunction with overhead activity as well as nighttime pain.  She is not had any therapy or or injections.    ____________________________________________________________________    Interval history 2023 : Patient returns today for follow up of her left shoulder.  Has known rotator cuff disease of the left shoulder.  We attempted physical therapy last visit however she did not go.  Reports pain that is the same if not worse in the left shoulder.  Still having nighttime symptoms and pain with overhead activity.    PAST MEDICAL HISTORY:   Past Medical History:   Diagnosis Date    Diabetes mellitus     GERD (gastroesophageal reflux disease)     Hypertension     Legally blind      PAST SURGICAL HISTORY:   Past Surgical History:   Procedure Laterality Date     SECTION      Twice    HYSTERECTOMY      TUBAL LIGATION       FAMILY HISTORY: No family history on file.  SOCIAL HISTORY:   Social History     Occupational History    Not on file   Tobacco Use    Smoking status: Never    Smokeless tobacco: Never   Substance and Sexual Activity    Alcohol use: Not Currently    Drug use: Never    Sexual activity: Yes        MEDICATIONS:   Current Outpatient Medications:     ALPRAZolam (XANAX) 1 MG tablet, Take 1 mg by mouth 3 (three) times daily as needed., Disp: , Rfl:     atropine 1% (ISOPTO ATROPINE) 1 % Drop, Place 1 drop into the right eye 2 (two) times daily., Disp: , Rfl:     augmented betamethasone dipropionate (DIPROLENE-AF) 0.05 % cream, APPLY TO SPOT ON BREAST  "TWICE DAILY, Disp: , Rfl:     BD ULTRA-FINE MICRO PEN NEEDLE 32 gauge x 1/4" Ndle, Inject into the skin., Disp: , Rfl:     bismuth subsalicylate (BISMAREX) 262 mg Chew, Take 2 tablets by mouth 4 (four) times daily., Disp: , Rfl:     busPIRone (BUSPAR) 7.5 MG tablet, Take 7.5 mg by mouth 3 (three) times daily., Disp: , Rfl:     carvediloL (COREG) 25 MG tablet, Take 1.5 tablets (37.5 mg total) by mouth 2 (two) times daily., Disp: 90 tablet, Rfl: 11    clobetasoL (CLOBEX) 0.05 % shampoo, Apply topically once daily., Disp: 118 mL, Rfl: 3    COMBIGAN 0.2-0.5 % Drop, Place into both eyes., Disp: , Rfl:     doxepin (SINEQUAN) 10 MG capsule, TAKE 1 CAPSULE BY MOUTH NIGHTLY AS NEEDED (FOR ITCHING). PLEASE STOP MEDICATION IF YOUR BLOOD PRESSURE INCREASES, YOU EXPERIENCE MUSCLE STIFFNESS OR OTHER SIDE EFFECT., Disp: 30 capsule, Rfl: 1    doxepin (ZONALON) 5 % cream, Apply topically 4 (four) times daily., Disp: 45 g, Rfl: 3    fluocinolone (DERMA-SMOOTHE) 0.01 % external oil, Apply topically 3 (three) times daily. Apply to scalp and ears twice per day, Disp: 118 mL, Rfl: 3    furosemide (LASIX) 40 MG tablet, Take 1 tablet (40 mg total) by mouth once daily., Disp: 90 tablet, Rfl: 3    glipiZIDE (GLUCOTROL) 10 MG tablet, Take 10 mg by mouth 2 (two) times daily., Disp: , Rfl:     JANUMET XR  mg TM24, Take 1 tablet by mouth once daily., Disp: , Rfl:     ketoconazole (NIZORAL) 2 % shampoo, APPLY TWICE A WEEK LEAVE ON SCALP 10 - 15 MIN, THEN RINSE, Disp: , Rfl:     lamoTRIgine 50 mg TR24, Take 1 tablet by mouth once daily., Disp: , Rfl:     loratadine (CLARITIN) 10 mg tablet, Take 1 tablet (10 mg total) by mouth 4 (four) times daily., Disp: 120 tablet, Rfl: 4    losartan-hydrochlorothiazide 100-25 mg (HYZAAR) 100-25 mg per tablet, Take 1 tablet by mouth once daily., Disp: 90 tablet, Rfl: 3    metroNIDAZOLE (FLAGYL) 250 MG tablet, Take 250 mg by mouth 4 (four) times daily., Disp: , Rfl:     minocycline (MINOCIN,DYNACIN) 50 MG " Cap, Take 50 mg by mouth 2 (two) times daily., Disp: , Rfl:     minoxidiL (LONITEN) 2.5 MG tablet, Take 2 tablets (5 mg total) by mouth 2 (two) times daily., Disp: 120 tablet, Rfl: 11    mometasone (ELOCON) 0.1 % solution, APPLY TO AFFECTED AREA EVERY DAY, Disp: 60 mL, Rfl: 1    mometasone 0.1% (ELOCON) 0.1 % cream, Apply topically once daily., Disp: 45 g, Rfl: 1    naproxen (NAPROSYN) 500 MG tablet, Take 500 mg by mouth 2 (two) times daily., Disp: , Rfl:     omeprazole (PRILOSEC) 20 MG capsule, Take 20 mg by mouth once daily., Disp: , Rfl:     OXcarbazepine (TRILEPTAL) 150 MG Tab, Take 150 mg by mouth 2 (two) times daily., Disp: , Rfl:     pantoprazole (PROTONIX) 40 MG tablet, Take 40 mg by mouth once daily., Disp: , Rfl:     potassium chloride (MICRO-K) 10 MEQ CpSR, Take 10 mEq by mouth once daily., Disp: , Rfl:     pravastatin (PRAVACHOL) 20 MG tablet, Take 20 mg by mouth., Disp: , Rfl:     prednisoLONE acetate (PRED FORTE) 1 % DrpS, Place 1 drop into both eyes 4 (four) times daily., Disp: , Rfl:     QUEtiapine (SEROQUEL) 25 MG Tab, Take 25 mg by mouth nightly., Disp: , Rfl:     sucralfate (CARAFATE) 1 gram tablet, TAKE 1 TABLET (1 GM) BY MOUTH TWICE A DAY, Disp: , Rfl:     tacrolimus (PROTOPIC) 0.1 % ointment, Apply topically 2 (two) times daily. Apply to the face twice per day, Disp: 100 g, Rfl: 3    tetracycline (ACHROMYCIN,SUMYCIN) 500 MG capsule, Take 500 mg by mouth 4 (four) times daily., Disp: , Rfl:     timolol maleate 0.5% (TIMOPTIC) 0.5 % Drop, PLACE ONE DROP IN BOTH EYES TWO TIMES DAILY, Disp: , Rfl:     TRESIBA FLEXTOUCH U-100 100 unit/mL (3 mL) insulin pen, Inject into the skin., Disp: , Rfl:     triamcinolone acetonide 0.1% (KENALOG) 0.1 % ointment, APPLY TO SCALP AND BEHIND EARS TWICE DAILY FOR ITCH, Disp: , Rfl:     valsartan (DIOVAN) 160 MG tablet, Take 1 tablet (160 mg total) by mouth 2 (two) times daily., Disp: 180 tablet, Rfl: 3    VITAMIN D3 25 mcg (1,000 unit) tablet, Take 1,000 Units by  mouth once daily., Disp: , Rfl:     zolpidem (AMBIEN CR) 12.5 MG CR tablet, Take 12.5 mg by mouth., Disp: , Rfl:     aspirin 81 MG Chew, Take 1 tablet (81 mg total) by mouth once daily. (Patient not taking: Reported on 8/9/2023), Disp: 90 tablet, Rfl: 3    clopidogreL (PLAVIX) 75 mg tablet, Take 1 tablet (75 mg total) by mouth once daily. (Patient not taking: Reported on 8/9/2023), Disp: 30 tablet, Rfl: 11    metFORMIN (GLUCOPHAGE) 500 MG tablet, Take 2 tablets (1,000 mg total) by mouth 2 (two) times daily with meals., Disp: 120 tablet, Rfl: 2  ALLERGIES:   Review of patient's allergies indicates:   Allergen Reactions    Trazodone Other (See Comments)     Pt says this medication gives her nightmares.     Vancomycin analogues Rash         Physical Exam     Vitals:    09/12/23 1351   Resp: 16     Alert and oriented to person, place and time. No acute distress. Well-groomed, not ill appearing. Pupils round and reactive, normal respiratory effort, no audible wheezing.     Shoulder  Exam    OBSERVATION:     Swelling  none     Discoloration  none      Scars   none     Deformity  None    TENDERNESS                Clavicle   negative         AC Jt.    negative               Acromion:  negative        Scapular Spine negative   Supraspinatus  positive       Infraspinatus  negative   LH Biceps   positive   Greater Tub.  Positive        ROM:               Forward Flexion  140°       ER at 0°    60°        ER at 90° ABD  90°       IR at 90°  ABD   NA         IR (spine level)   L2        STRENGTH:       SCAPTION   4+/5        IR    5/5       ER    4+/5       BICEPS   5/5            SIGNS:          NEER   +     FISHMAN   +        DROP ARM   Neg   LINETTE's   +    BELLY PRESS Neg   O'AL's  Neg    SPEEDs  +   LIFT-OFF  Neg   X-Body ADD    Neg                  Imaging:       X-Ray: I have reviewed all pertinent results/findings and my personal findings are:  No significant evidence of fracture or dislocation.  No degenerative  changes.  MRI: I have reviewed all pertinent results/findings and my personal findings are:  Near full-thickness tear of the supraspinatus without any significant retraction.  No significant atrophy.  Degenerative SLAP tear.      Assessment & Plan    Traumatic complete tear of left rotator cuff, initial encounter           Treatment options were discussed with her in detail.  I went over her x-rays and MRI which do show rotator cuff tear of the left shoulder.  We discussed operative and nonoperative treatment.  Operatively we discussed rotator cuff repair arthroscopically and possible biceps tenodesis with indicated procedures.  She is interested in surgical intervention due to worsening pain and persistent nighttime symptoms.  She would not be ready for this until about 2 or 3 months for now.  We discussed obtaining medical clearance and following up prior to having surgery for her preoperative appointment.           Mauc Instructions: By selecting yes to the question below the MAUC number will be added into the note.  This will be calculated automatically based on the diagnosis chosen, the size entered, the body zone selected (H,M,L) and the specific indications you chose. You will also have the option to override the Mohs AUC if you disagree with the automatically calculated number and this option is found in the Case Summary tab.

## 2023-10-11 ENCOUNTER — PATIENT MESSAGE (OUTPATIENT)
Dept: FAMILY MEDICINE | Facility: CLINIC | Age: 48
End: 2023-10-11

## 2023-10-20 NOTE — PROGRESS NOTES
"Subjective:       Patient ID: Mayra Davis is a 47 y.o. female.    Chief Complaint: Itching    HPI    Pt presents  For rash    Last visit 1/2023    Since her last visit,   Labs performed today, pending result - tsh, crp, spep, zara.   Rx'd clobtetasol, doxepin cream , claritin 10 mg bid - qid, rec bx with derm     Pt has not seen derm. - awaiting new insurance card ?   She states she didn't get the loratadine. Nurses will call to find out  There is a confirmed receipt from pharmacy.     Started 2 years ago   Thought it was due to the water in [place]  Then moved to [place] and no change     Pt states she has scalp sores.   Wears a wig   And when tucks wig behind her ears, she has "sores"  Wig is made of human hair and synthetics as well.     If she bathes with fragrant soaps or shampoos and conditioners.     Pt states if she puts alcohol, witch hazel her face will have "bumps" as well.   She can only use water.     She states her skin has not been like this prior to the start of the rash  Lip gloss can also make her lips break out.     If she uses powder - on her face - concealer - nars double wear she will have facial burning.   Even with rinsing the makeup off, she will still have burning.     She also uses eye drops and will have scratching around her nose and lips.     Benadryl will help.     Pt states lotions will also make her itch. Coconut oil will make her itch.       No history of eczema when she was younger.     If she puts hydrocortisone on her face, it will also itch.     Pt has also tried noxema- but this burns. She states her skin looks like it is "burnt"  Then her skin peels and she will "pick at it" and sores will form.     50 mg of benadryl seems to help in about 1 hour. - doesn't make her drowsy or sedated.     Pt states however, she can NOT put on any products and still having itching on the scalp and face.     Pt has a strong family history of autoimmunity. Has not been tested her self. "     PMH  Stroke  HTN  Blindness    Fhx  Mgma, pgma - RA  Puncle - Lupus  Maunt- lupus     Component      Latest Ref Rng & Units 8/31/2022   TSH      0.400 - 4.000 uIU/mL 2.628     Component      Latest Ref Rng & Units 2/14/2023   CRP      <0.76 mg/dL 0.33       Review of Systems    General: neg unexpected weight changes, fevers, chills, night sweats, malaise  HEENT: see hpi, Neg eye pain, vision changes, ear drainage, nose bleeds, throat tightness, sores in the mouth  CV: Neg chest pain, palpitations, swelling  Resp: see hpi, neg shortness of breath, hemoptysis, cough  GI: see hpi, neg dysphagia, night abdominal pain, reflux, chronic diarrhea, chronic constipation  Derm: See Hpi, neg new rash, neg flushing  Mu/sk: Neg joint pain, joint swelling   Psych: Neg anxiety  neuro: neg chronic headaches, muscle weakness  Endo: neg heat/cold intolerance, chronic fatigue    Objective:     Vitals:    02/14/23 0947   BP: (!) 170/79   Pulse: 83   Temp: 97.1 °F (36.2 °C)   SpO2: 99%   Weight: 79.4 kg (175 lb)          Physical Exam    General: no acute distress, well developed well nourished   Skin: there is one lesion on the left lateral to breast scar type line dark hyperpigmented purpuric. On the back of the neck there are excoriations. Not hyperpigmented.   Assessment:       1. Pruritus    2. Dermatitis    3. Elevated blood pressure reading          Plan:       Pruritus  -     doxepin (SINEQUAN) 10 MG capsule; Take 1 capsule (10 mg total) by mouth nightly as needed (for itching). Please stop medication if your blood pressure increases, you experience muscle stiffness or other side effect.  Dispense: 30 capsule; Refill: 1    Dermatitis  -     doxepin (SINEQUAN) 10 MG capsule; Take 1 capsule (10 mg total) by mouth nightly as needed (for itching). Please stop medication if your blood pressure increases, you experience muscle stiffness or other side effect.  Dispense: 30 capsule; Refill: 1    Elevated blood pressure  reading                Purpuric lesions  2/23:  Still present  Recommend derm to biopsy   Continue clobetasol, but only helps somewhat. - spare the face  Having itching around the nose and lips.   Rec patch testing to cosmetic with derm - pt [facility]. Will have them patch test.   Continue doxepin cream as needed.     Start doxepin 10 mg capsule- discussed there is risk for drug interaction on this medication  That may include increase in blood pressure, sedation, muscle stiffness, or other side effects.     ROAT discussed and recommended for a home patch test.     There is no generalized rash. This makes me think less likely drug allergy.       1/23:  Dermatology to biopsy   Consider discoid lupus vs purigo nodularis vs other   Smh labs non fasting      Start clobetasol shampoo if tolerated    Rec patch testing to cosmetic with derm     Take antihistamines qid prn claritin    Start doxepin cream bid prn     Elevated blodo pressure  2/23:  Discussed for her to monitor and make sure the blood pressure does not remain elevated as she has had a history of stroke.       Follow up 3 months, sooner if needed.       Deborah Collins M.D.  Allergy/Immunology  [facility]   [phone] phone  [phone] fax               Transposition Flap Text: The defect edges were debeveled with a #15 scalpel blade.  Given the location of the defect and the proximity to free margins a transposition flap was deemed most appropriate.  Using a sterile surgical marker, an appropriate transposition flap was drawn incorporating the defect.    The area thus outlined was incised deep to adipose tissue with a #15 scalpel blade.  The skin margins were undermined to an appropriate distance in all directions utilizing iris scissors.

## 2023-11-02 ENCOUNTER — OFFICE VISIT (OUTPATIENT)
Dept: CARDIOLOGY | Facility: CLINIC | Age: 48
End: 2023-11-02
Payer: MEDICARE

## 2023-11-02 VITALS
WEIGHT: 184 LBS | DIASTOLIC BLOOD PRESSURE: 85 MMHG | SYSTOLIC BLOOD PRESSURE: 184 MMHG | BODY MASS INDEX: 31.41 KG/M2 | HEIGHT: 64 IN | HEART RATE: 78 BPM

## 2023-11-02 DIAGNOSIS — I10 PRIMARY HYPERTENSION: Primary | ICD-10-CM

## 2023-11-02 PROCEDURE — 4010F ACE/ARB THERAPY RXD/TAKEN: CPT | Mod: CPTII,S$GLB,, | Performed by: INTERNAL MEDICINE

## 2023-11-02 PROCEDURE — 99214 OFFICE O/P EST MOD 30 MIN: CPT | Mod: S$GLB,,, | Performed by: INTERNAL MEDICINE

## 2023-11-02 PROCEDURE — 3008F BODY MASS INDEX DOCD: CPT | Mod: CPTII,S$GLB,, | Performed by: INTERNAL MEDICINE

## 2023-11-02 PROCEDURE — 99999 PR PBB SHADOW E&M-EST. PATIENT-LVL IV: ICD-10-PCS | Mod: PBBFAC,,, | Performed by: INTERNAL MEDICINE

## 2023-11-02 PROCEDURE — 3079F PR MOST RECENT DIASTOLIC BLOOD PRESSURE 80-89 MM HG: ICD-10-PCS | Mod: CPTII,S$GLB,, | Performed by: INTERNAL MEDICINE

## 2023-11-02 PROCEDURE — 99214 PR OFFICE/OUTPT VISIT, EST, LEVL IV, 30-39 MIN: ICD-10-PCS | Mod: S$GLB,,, | Performed by: INTERNAL MEDICINE

## 2023-11-02 PROCEDURE — 3077F PR MOST RECENT SYSTOLIC BLOOD PRESSURE >= 140 MM HG: ICD-10-PCS | Mod: CPTII,S$GLB,, | Performed by: INTERNAL MEDICINE

## 2023-11-02 PROCEDURE — 3079F DIAST BP 80-89 MM HG: CPT | Mod: CPTII,S$GLB,, | Performed by: INTERNAL MEDICINE

## 2023-11-02 PROCEDURE — 3077F SYST BP >= 140 MM HG: CPT | Mod: CPTII,S$GLB,, | Performed by: INTERNAL MEDICINE

## 2023-11-02 PROCEDURE — 1159F PR MEDICATION LIST DOCUMENTED IN MEDICAL RECORD: ICD-10-PCS | Mod: CPTII,S$GLB,, | Performed by: INTERNAL MEDICINE

## 2023-11-02 PROCEDURE — 4010F PR ACE/ARB THEARPY RXD/TAKEN: ICD-10-PCS | Mod: CPTII,S$GLB,, | Performed by: INTERNAL MEDICINE

## 2023-11-02 PROCEDURE — 99999 PR PBB SHADOW E&M-EST. PATIENT-LVL IV: CPT | Mod: PBBFAC,,, | Performed by: INTERNAL MEDICINE

## 2023-11-02 PROCEDURE — 1159F MED LIST DOCD IN RCRD: CPT | Mod: CPTII,S$GLB,, | Performed by: INTERNAL MEDICINE

## 2023-11-02 PROCEDURE — 3008F PR BODY MASS INDEX (BMI) DOCUMENTED: ICD-10-PCS | Mod: CPTII,S$GLB,, | Performed by: INTERNAL MEDICINE

## 2023-11-02 RX ORDER — CARVEDILOL 25 MG/1
50 TABLET ORAL 2 TIMES DAILY WITH MEALS
Qty: 120 TABLET | Refills: 11 | Status: SHIPPED | OUTPATIENT
Start: 2023-11-02 | End: 2024-11-01

## 2023-11-02 NOTE — PROGRESS NOTES
Subjective:    Patient ID:  Mayra Davis is a 47 y.o. female patient here for evaluation Hypertension      History of Present Illness:  Follow-up visit.  Hypertensive cardiovascular disease.  Multidrug regime.  Remote history of CVA, September last year, RT PA.  No significant residual neurologic defect.   RENAL ARTERY ULTRASOUND NEGATIVE FOR RENAL ARTERY STENOSIS 2022.  Blood pressure remains elevated, repeat blood pressure by me 170/92.    No chest pain, shortness breath.  Palpitations.  No edema.          Review of patient's allergies indicates:   Allergen Reactions    Trazodone Other (See Comments)     Pt says this medication gives her nightmares.     Vancomycin analogues Rash       Past Medical History:   Diagnosis Date    Diabetes mellitus     GERD (gastroesophageal reflux disease)     Hypertension     Legally blind      Past Surgical History:   Procedure Laterality Date     SECTION      Twice    HYSTERECTOMY      TUBAL LIGATION       Social History     Tobacco Use    Smoking status: Never    Smokeless tobacco: Never   Substance Use Topics    Alcohol use: Not Currently    Drug use: Never        Review of Systems:    As noted in HPI in addition      REVIEW OF SYSTEMS  Review of Systems   Constitutional: Negative for decreased appetite, diaphoresis, night sweats, weight gain and weight loss.   HENT:  Negative for nosebleeds and odynophagia.    Eyes:  Negative for double vision and photophobia.   Cardiovascular:  Negative for chest pain, claudication, cyanosis, dyspnea on exertion, irregular heartbeat, leg swelling, near-syncope, orthopnea, palpitations, paroxysmal nocturnal dyspnea and syncope.   Respiratory:  Negative for cough, hemoptysis, shortness of breath and wheezing.    Hematologic/Lymphatic: Negative for adenopathy.   Skin:  Negative for flushing, skin cancer and suspicious lesions.   Musculoskeletal:  Negative for gout, myalgias and neck pain.   Gastrointestinal:  Negative for  abdominal pain, heartburn, hematemesis and hematochezia.   Genitourinary:  Negative for bladder incontinence, hesitancy and nocturia.   Neurological:  Negative for focal weakness, headaches, light-headedness and paresthesias.   Psychiatric/Behavioral:  Negative for memory loss and substance abuse.               Objective:        Vitals:    11/02/23 0911   BP: (!) 184/85   Pulse: 78       Lab Results   Component Value Date    WBC 5.49 06/27/2023    HGB 10.1 (L) 06/27/2023    HCT 30.4 (L) 06/27/2023     06/27/2023    CHOL 155 08/31/2022    TRIG 154 (H) 08/31/2022    HDL 77 (H) 08/31/2022    ALT 58 (H) 06/10/2023    AST 44 (H) 06/10/2023     (L) 08/16/2023    K 4.6 08/16/2023     08/16/2023    CREATININE 1.3 08/16/2023    BUN 23 (H) 08/16/2023    CO2 26 08/16/2023    TSH 3.740 03/20/2023    INR 1.0 08/31/2022    HGBA1C 7.1 (H) 09/01/2022        ECHOCARDIOGRAM RESULTS  Results for orders placed during the hospital encounter of 09/01/22    Echo Saline Bubble? Yes    Interpretation Summary  · The left ventricle is normal in size with moderate concentric hypertrophy and normal systolic function.  · The estimated ejection fraction is 65%.  · Normal left ventricular diastolic function.  · Normal right ventricular size with normal right ventricular systolic function.  · No interatrial septal defect present.  · Mild tricuspid regurgitation.        CURRENT/PREVIOUS VISIT EKG  Results for orders placed or performed during the hospital encounter of 06/10/23   EKG 12-lead    Collection Time: 06/10/23  9:01 PM    Narrative    Test Reason : R07.9,    Vent. Rate : 077 BPM     Atrial Rate : 077 BPM     P-R Int : 196 ms          QRS Dur : 070 ms      QT Int : 396 ms       P-R-T Axes : 054 073 040 degrees     QTc Int : 448 ms    Normal sinus rhythm  Abnormal R wave progression  Abnormal ECG  When compared with ECG of 10-CAROL-2023 20:37,  No significant change was found  Confirmed by Josue Mariano MD ([id number]) on 6/11/2023  "6:08:31 PM    Referred By: IGNACIO   SELF           Confirmed By:Josue Mariano MD     No valid procedures specified.   Results for orders placed during the hospital encounter of 05/16/22    Nuclear Stress Test    Interpretation Summary    The EKG portion of this study is negative for ischemia.    There were no arrhythmias during stress.    No valid procedures specified.    PHYSICAL EXAM  CONSTITUTIONAL: Well built, well nourished in no apparent distress  NECK: no carotid bruit, no JVD  LUNGS: CTA  CHEST WALL: no tenderness,  HEART: regular rate and rhythm, S1, S2 normal, no murmur, click, rub or gallop   ABDOMEN: soft, non-tender; bowel sounds normal; no masses,  no organomegaly  EXTREMITIES: Extremities normal, no edema, no calf tenderness noted  NEURO: AAO X 3    I HAVE REVIEWED :    The vital signs, nurses notes, and all the pertinent radiology and labs.         Current Outpatient Medications   Medication Instructions    ALPRAZolam (XANAX) 1 mg, Oral, 3 times daily PRN    aspirin 81 mg, Oral, Daily    atropine 1% (ISOPTO ATROPINE) 1 % Drop 1 drop, Right Eye, 2 times daily    augmented betamethasone dipropionate (DIPROLENE-AF) 0.05 % cream APPLY TO SPOT ON BREAST TWICE DAILY    BD ULTRA-FINE MICRO PEN NEEDLE 32 gauge x 1/4" Ndle Subcutaneous    bismuth subsalicylate (BISMAREX) 262 mg Chew 2 tablets, Oral, 4 times daily    busPIRone (BUSPAR) 7.5 mg, Oral, 3 times daily    carvediloL (COREG) 37.5 mg, Oral, 2 times daily    clobetasoL (CLOBEX) 0.05 % shampoo Topical (Top), Daily    clopidogreL (PLAVIX) 75 mg, Oral, Daily    COMBIGAN 0.2-0.5 % Drop Both Eyes    doxepin (SINEQUAN) 10 MG capsule TAKE 1 CAPSULE BY MOUTH NIGHTLY AS NEEDED (FOR ITCHING). PLEASE STOP MEDICATION IF YOUR BLOOD PRESSURE INCREASES, YOU EXPERIENCE MUSCLE STIFFNESS OR OTHER SIDE EFFECT.    doxepin (ZONALON) 5 % cream Topical (Top), 4 times daily    fluocinolone (DERMA-SMOOTHE) 0.01 % external oil Topical (Top), 3 times daily, Apply to scalp and " ears twice per day    furosemide (LASIX) 40 mg, Oral, Daily    glipiZIDE (GLUCOTROL) 10 mg, Oral, 2 times daily    JANUMET XR  mg TM24 1 tablet, Oral, Daily    ketoconazole (NIZORAL) 2 % shampoo APPLY TWICE A WEEK LEAVE ON SCALP 10 - 15 MIN, THEN RINSE    lamoTRIgine 50 mg TR24 1 tablet, Oral, Daily    loratadine (CLARITIN) 10 mg, Oral, 4 times daily    losartan-hydrochlorothiazide 100-25 mg (HYZAAR) 100-25 mg per tablet 1 tablet, Oral, Daily    metFORMIN (GLUCOPHAGE) 1,000 mg, Oral, 2 times daily with meals    metroNIDAZOLE (FLAGYL) 250 mg, Oral, 4 times daily    minocycline (MINOCIN,DYNACIN) 50 mg, Oral, 2 times daily    minoxidiL (LONITEN) 5 mg, Oral, 2 times daily    mometasone (ELOCON) 0.1 % solution APPLY TO AFFECTED AREA EVERY DAY    mometasone 0.1% (ELOCON) 0.1 % cream Topical (Top), Daily    naproxen (NAPROSYN) 500 mg, Oral, 2 times daily    omeprazole (PRILOSEC) 20 mg, Oral, Daily    OXcarbazepine (TRILEPTAL) 150 mg, Oral, 2 times daily    pantoprazole (PROTONIX) 40 mg, Oral, Daily    potassium chloride (MICRO-K) 10 MEQ CpSR 10 mEq, Oral, Daily    pravastatin (PRAVACHOL) 20 mg, Oral    prednisoLONE acetate (PRED FORTE) 1 % DrpS 1 drop, Both Eyes, 4 times daily    QUEtiapine (SEROQUEL) 25 mg, Oral, Nightly    sucralfate (CARAFATE) 1 gram tablet TAKE 1 TABLET (1 GM) BY MOUTH TWICE A DAY    tacrolimus (PROTOPIC) 0.1 % ointment Topical (Top), 2 times daily, Apply to the face twice per day    tetracycline (ACHROMYCIN,SUMYCIN) 500 mg, Oral, 4 times daily    timolol maleate 0.5% (TIMOPTIC) 0.5 % Drop PLACE ONE DROP IN BOTH EYES TWO TIMES DAILY    TRESIBA FLEXTOUCH U-100 100 unit/mL (3 mL) insulin pen Subcutaneous    triamcinolone acetonide 0.1% (KENALOG) 0.1 % ointment APPLY TO SCALP AND BEHIND EARS TWICE DAILY FOR ITCH    valsartan (DIOVAN) 160 mg, Oral, 2 times daily    VITAMIN D3 1,000 Units, Oral, Daily    zolpidem (AMBIEN CR) 12.5 mg, Oral          Assessment:   Hypertensive cardiovascular disease.   Difficult to control blood pressure on multidrug regime.  Renal artery stenosis screening-9/2022.    Remote history of CVA, no neurologic sequelae.    Diabetes mellitus, hypertension dyslipidemia    Noninvasive cardiac assessment stable 2022.    Plan:   Increase Coreg to 50 b.i.d..  Continue Hyzaar 100/25 daily,  add Catapres 0.1 b.i.d..  Heart pill in the pocket.  Follow-up basic metabolic profile with stable renal function panel, elevated blood sugar  Continue home blood pressure monitoring.  Intermittent blood pressure recordings at home.  To be normal.  Labs and follow-up primary care  4m    No follow-ups on file.

## 2023-11-06 ENCOUNTER — OFFICE VISIT (OUTPATIENT)
Dept: ALLERGY | Facility: CLINIC | Age: 48
End: 2023-11-06
Payer: MEDICARE

## 2023-11-06 VITALS
WEIGHT: 184 LBS | OXYGEN SATURATION: 99 % | DIASTOLIC BLOOD PRESSURE: 66 MMHG | SYSTOLIC BLOOD PRESSURE: 130 MMHG | HEART RATE: 89 BPM | HEIGHT: 64 IN | BODY MASS INDEX: 31.41 KG/M2

## 2023-11-06 DIAGNOSIS — L29.9 PRURITUS: Primary | ICD-10-CM

## 2023-11-06 DIAGNOSIS — L30.9 DERMATITIS: ICD-10-CM

## 2023-11-06 PROCEDURE — 3078F DIAST BP <80 MM HG: CPT | Mod: CPTII,S$GLB,, | Performed by: ALLERGY & IMMUNOLOGY

## 2023-11-06 PROCEDURE — 4010F PR ACE/ARB THEARPY RXD/TAKEN: ICD-10-PCS | Mod: CPTII,S$GLB,, | Performed by: ALLERGY & IMMUNOLOGY

## 2023-11-06 PROCEDURE — 1160F PR REVIEW ALL MEDS BY PRESCRIBER/CLIN PHARMACIST DOCUMENTED: ICD-10-PCS | Mod: CPTII,S$GLB,, | Performed by: ALLERGY & IMMUNOLOGY

## 2023-11-06 PROCEDURE — 3008F PR BODY MASS INDEX (BMI) DOCUMENTED: ICD-10-PCS | Mod: CPTII,S$GLB,, | Performed by: ALLERGY & IMMUNOLOGY

## 2023-11-06 PROCEDURE — 3078F PR MOST RECENT DIASTOLIC BLOOD PRESSURE < 80 MM HG: ICD-10-PCS | Mod: CPTII,S$GLB,, | Performed by: ALLERGY & IMMUNOLOGY

## 2023-11-06 PROCEDURE — 99213 PR OFFICE/OUTPT VISIT, EST, LEVL III, 20-29 MIN: ICD-10-PCS | Mod: S$GLB,,, | Performed by: ALLERGY & IMMUNOLOGY

## 2023-11-06 PROCEDURE — 99213 OFFICE O/P EST LOW 20 MIN: CPT | Mod: S$GLB,,, | Performed by: ALLERGY & IMMUNOLOGY

## 2023-11-06 PROCEDURE — 1159F MED LIST DOCD IN RCRD: CPT | Mod: CPTII,S$GLB,, | Performed by: ALLERGY & IMMUNOLOGY

## 2023-11-06 PROCEDURE — 1159F PR MEDICATION LIST DOCUMENTED IN MEDICAL RECORD: ICD-10-PCS | Mod: CPTII,S$GLB,, | Performed by: ALLERGY & IMMUNOLOGY

## 2023-11-06 PROCEDURE — 3008F BODY MASS INDEX DOCD: CPT | Mod: CPTII,S$GLB,, | Performed by: ALLERGY & IMMUNOLOGY

## 2023-11-06 PROCEDURE — 3075F SYST BP GE 130 - 139MM HG: CPT | Mod: CPTII,S$GLB,, | Performed by: ALLERGY & IMMUNOLOGY

## 2023-11-06 PROCEDURE — 4010F ACE/ARB THERAPY RXD/TAKEN: CPT | Mod: CPTII,S$GLB,, | Performed by: ALLERGY & IMMUNOLOGY

## 2023-11-06 PROCEDURE — 1160F RVW MEDS BY RX/DR IN RCRD: CPT | Mod: CPTII,S$GLB,, | Performed by: ALLERGY & IMMUNOLOGY

## 2023-11-06 PROCEDURE — 3075F PR MOST RECENT SYSTOLIC BLOOD PRESS GE 130-139MM HG: ICD-10-PCS | Mod: CPTII,S$GLB,, | Performed by: ALLERGY & IMMUNOLOGY

## 2023-11-06 RX ORDER — DOXEPIN HYDROCHLORIDE 10 MG/1
CAPSULE ORAL
Qty: 30 CAPSULE | Refills: 1 | Status: SHIPPED | OUTPATIENT
Start: 2023-11-06

## 2023-11-06 RX ORDER — CLOBETASOL PROPIONATE 0.05 G/100ML
SHAMPOO TOPICAL DAILY
Qty: 118 ML | Refills: 3 | Status: SHIPPED | OUTPATIENT
Start: 2023-11-06

## 2023-11-06 RX ORDER — MOMETASONE FUROATE 1 MG/ML
SOLUTION TOPICAL
Qty: 60 ML | Refills: 6 | Status: SHIPPED | OUTPATIENT
Start: 2023-11-06

## 2023-11-06 RX ORDER — CAMPHOR AND MENTHOL 5; 5 MG/ML; MG/ML
LOTION TOPICAL
Qty: 222 ML | Refills: 3 | Status: SHIPPED | OUTPATIENT
Start: 2023-11-06

## 2023-11-06 RX ORDER — TACROLIMUS 1 MG/G
OINTMENT TOPICAL 2 TIMES DAILY
Qty: 100 G | Refills: 3 | Status: SHIPPED | OUTPATIENT
Start: 2023-11-06

## 2023-11-06 RX ORDER — DOXEPIN HYDROCHLORIDE 50 MG/G
CREAM TOPICAL 4 TIMES DAILY
Qty: 45 G | Refills: 3 | Status: SHIPPED | OUTPATIENT
Start: 2023-11-06

## 2023-11-06 RX ORDER — LORATADINE 10 MG/1
10 TABLET ORAL 4 TIMES DAILY
Qty: 120 TABLET | Refills: 4 | Status: SHIPPED | OUTPATIENT
Start: 2023-11-06 | End: 2024-11-05

## 2023-11-06 RX ORDER — FLUOCINOLONE ACETONIDE 0.11 MG/ML
OIL TOPICAL 3 TIMES DAILY
Qty: 118 ML | Refills: 3 | Status: SHIPPED | OUTPATIENT
Start: 2023-11-06

## 2023-11-06 RX ORDER — TRIAMCINOLONE ACETONIDE 1 MG/G
OINTMENT TOPICAL
Qty: 453 G | Refills: 2 | Status: SHIPPED | OUTPATIENT
Start: 2023-11-06

## 2023-11-06 NOTE — LETTER
November 6, 2023        Teresita Darden MD  [address] [facility] [address]             [facility] - Allergy  [address]  [address]  [place] [address]  Phone: [phone]  Fax: [phone]   Patient: Mayra Davis   MR Number: [id number]   YOB: 1975   Date of Visit: 11/6/2023       Dear Dr. Darden:    Thank you for referring Mayra Davis to me for evaluation. Below are the relevant portions of my assessment and plan of care.    No diagnosis found.      There are no diagnoses linked to this encounter.          If you have questions, please do not hesitate to call me. I look forward to following Mayra along with you.    Sincerely,      Deborah Collins MD           CC  No Recipients

## 2023-11-06 NOTE — PROGRESS NOTES
"Subjective:       Patient ID: Mayra Davis is a 47 y.o. female.    Chief Complaint: Itching (Not doing well with Dr.Weil. Cream does not help and breaking her out. Still itching, has not figured out food trigger yet. Does help taking clarritin and benadryl like you said )    HPI    Pt presents  For rash    Last visit 5/2023,    Since her last visit,      Creams prescribed by Dr. Weil did not help.   The loratadine and benadryl did help.     She would like to try an elimination diet.     She would like to get a biopsy or patch test for her dermatitis. They can be purpuric.     She notes she tried to get patch testing with Dr. Weil but he no longer performs those tests.   Discussed to see if Dr. Gay can perform the patch test.     She states creams and pills take the edge off of the itch, but she is still itching daily.   Ears and face is where she is itching the most perioral primarily.     Would like to know if there is something she is coming in contact with that is causing her to itch.   She feels a sensation that "something is under there"       Labs performed today, pending result - tsh, crp, spep, zara.   Rx'd clobtetasol, doxepin cream , claritin 10 mg bid - qid, rec bx with derm     Started 2 years ago   Thought it was due to the water in [place]  Then moved to [place] and no change     Pt states she has scalp sores.   Wears a wig   And when tucks wig behind her ears, she has "sores"  Wig is made of human hair and synthetics as well.     If she bathes with fragrant soaps or shampoos and conditioners.     Pt states if she puts alcohol, witch hazel her face will have "bumps" as well.   She can only use water.     She states her skin has not been like this prior to the start of the rash  Lip gloss can also make her lips break out.     If she uses powder - on her face - concealer - nars double wear she will have facial burning.   Even with rinsing the makeup off, she will still have burning.     She also " "uses eye drops and will have scratching around her nose and lips.     Benadryl will help.     Pt states lotions will also make her itch. Coconut oil will make her itch.       No history of eczema when she was younger.     If she puts hydrocortisone on her face, it will also itch.     Pt has also tried noxema- but this burns. She states her skin looks like it is "burnt"  Then her skin peels and she will "pick at it" and sores will form.     50 mg of benadryl seems to help in about 1 hour. - doesn't make her drowsy or sedated.     Pt states however, she can NOT put on any products and still having itching on the scalp and face.     Pt has a strong family history of autoimmunity. Has not been tested her self.     PMH  Stroke  HTN  Blindness    Fhx  Mgma, pgma - RA  Puncle - Lupus  Maunt- lupus     Component      Latest Ref Rng & Units 8/31/2022   TSH      0.400 - 4.000 uIU/mL 2.628     Component      Latest Ref Rng & Units 2/14/2023   CRP      <0.76 mg/dL 0.33       Component      Latest Ref Rng & Units 2/14/2023   PROTEIN TOTAL      6.0 - 8.5 g/dL 5.8 (L)   Albumin grams/dl      2.9 - 4.4 g/dL 2.7 (L)   Alpha-1 grams/dl      0.0 - 0.4 g/dL 0.3   Alpha-2      0.4 - 1.0 g/dL 0.8   Beta      0.7 - 1.3 g/dL 0.9   Gamma      0.4 - 1.8 g/dL 1.0   M-SPIKE, PROT ELECTRO      Not Observed g/dL Not Observed   Globulin, Total      2.2 - 3.9 g/dL 3.1   Albumin/Globulin Ratio      0.7 - 1.7 0.9   Please Note:       Comment     Component      Latest Ref Rng & Units 3/20/2023   TSH      0.340 - 5.600 uIU/mL 3.740       Review of Systems    General: neg unexpected weight changes, fevers, chills, night sweats, malaise  HEENT: see hpi, Neg eye pain, vision changes, ear drainage, nose bleeds, throat tightness, sores in the mouth  CV: Neg chest pain, palpitations, swelling  Resp: see hpi, neg shortness of breath, hemoptysis, cough  GI: see hpi, neg dysphagia, night abdominal pain, reflux, chronic diarrhea, chronic constipation  Derm: See " "Hpi, neg new rash, neg flushing  Mu/sk: Neg joint pain, joint swelling   Psych: Neg anxiety  neuro: neg chronic headaches, muscle weakness  Endo: neg heat/cold intolerance, chronic fatigue    Objective:     Vitals:    11/06/23 1458   BP: 130/66   Pulse: 89   SpO2: 99%   Weight: 83.5 kg (184 lb)   Height: 5' 4" (1.626 m)            Physical Exam    General: no acute distress, well developed well nourished   Skin: purpuric lesion noted on her right side, chest, and face.       Assessment:       1. Pruritus    2. Dermatitis            Plan:       Pruritus  -     Ambulatory referral/consult to Dermatology; Future; Expected date: 11/13/2023  -     clobetasoL (CLOBEX) 0.05 % shampoo; Apply topically once daily.  Dispense: 118 mL; Refill: 3  -     doxepin (SINEQUAN) 10 MG capsule; TAKE 1 CAPSULE BY MOUTH NIGHTLY AS NEEDED (FOR ITCHING). PLEASE STOP MEDICATION IF YOUR BLOOD PRESSURE INCREASES, YOU EXPERIENCE MUSCLE STIFFNESS OR OTHER SIDE EFFECT.  Dispense: 30 capsule; Refill: 1  -     doxepin (ZONALON) 5 % cream; Apply topically 4 (four) times daily.  Dispense: 45 g; Refill: 3  -     fluocinolone (DERMA-SMOOTHE) 0.01 % external oil; Apply topically 3 (three) times daily. Apply to scalp and ears twice per day  Dispense: 118 mL; Refill: 3  -     loratadine (CLARITIN) 10 mg tablet; Take 1 tablet (10 mg total) by mouth 4 (four) times daily.  Dispense: 120 tablet; Refill: 4  -     mometasone (ELOCON) 0.1 % solution; APPLY TO AFFECTED AREA twice per day  Dispense: 60 mL; Refill: 6  -     tacrolimus (PROTOPIC) 0.1 % ointment; Apply topically 2 (two) times daily. Apply to the face twice per day  Dispense: 100 g; Refill: 3  -     triamcinolone acetonide 0.1% (KENALOG) 0.1 % ointment; APPLY TO SCALP AND BEHIND EARS TWICE DAILY FOR ITCH  Dispense: 453 g; Refill: 2  -     camphor-menthol 0.5-0.5% (SARNA ORIGINAL) lotion; Apply topically as needed.  Dispense: 222 mL; Refill: 3    Dermatitis  -     Ambulatory referral/consult to " Dermatology; Future; Expected date: 11/13/2023  -     clobetasoL (CLOBEX) 0.05 % shampoo; Apply topically once daily.  Dispense: 118 mL; Refill: 3  -     doxepin (SINEQUAN) 10 MG capsule; TAKE 1 CAPSULE BY MOUTH NIGHTLY AS NEEDED (FOR ITCHING). PLEASE STOP MEDICATION IF YOUR BLOOD PRESSURE INCREASES, YOU EXPERIENCE MUSCLE STIFFNESS OR OTHER SIDE EFFECT.  Dispense: 30 capsule; Refill: 1  -     doxepin (ZONALON) 5 % cream; Apply topically 4 (four) times daily.  Dispense: 45 g; Refill: 3  -     fluocinolone (DERMA-SMOOTHE) 0.01 % external oil; Apply topically 3 (three) times daily. Apply to scalp and ears twice per day  Dispense: 118 mL; Refill: 3  -     loratadine (CLARITIN) 10 mg tablet; Take 1 tablet (10 mg total) by mouth 4 (four) times daily.  Dispense: 120 tablet; Refill: 4  -     mometasone (ELOCON) 0.1 % solution; APPLY TO AFFECTED AREA twice per day  Dispense: 60 mL; Refill: 6  -     tacrolimus (PROTOPIC) 0.1 % ointment; Apply topically 2 (two) times daily. Apply to the face twice per day  Dispense: 100 g; Refill: 3  -     triamcinolone acetonide 0.1% (KENALOG) 0.1 % ointment; APPLY TO SCALP AND BEHIND EARS TWICE DAILY FOR ITCH  Dispense: 453 g; Refill: 2  -     camphor-menthol 0.5-0.5% (SARNA ORIGINAL) lotion; Apply topically as needed.  Dispense: 222 mL; Refill: 3                  Purpuric lesions  11/23:  Referral to dermatology, ? Patch test, biopsy.   Refill shampoo clobtetasol , fluocinolone for face   Protopic for face.   Qid antihistamines     Wearing a wig exacerbates her scalp.     5/23:  Rash is improved but still itching   Clobetasol shampoo helps   Dermatology referral placed. - mabel does not patch test   Fuocinolone oil for scalp   Protopic for face.   Did not perform ROAT   Claritin increase from twice per day to four times per day       2/23:  Still present  Recommend derm to biopsy   Continue clobetasol, but only helps somewhat. - spare the face  Having itching around the nose and lips.    Rec patch testing to cosmetic with derm - pt [facility]. Will have them patch test.   Continue doxepin cream as needed.     Start doxepin 10 mg capsule- discussed there is risk for drug interaction on this medication  That may include increase in blood pressure, sedation, muscle stiffness, or other side effects.     ROCHALO discussed and recommended for a home patch test.     There is no generalized rash. This makes me think less likely drug allergy.       1/23:  Dermatology to biopsy   Consider discoid lupus vs purigo nodularis vs other   Smh labs non fasting      Start clobetasol shampoo if tolerated    Rec patch testing to cosmetic with derm     Take antihistamines qid prn claritin    Start doxepin cream bid prn     Elevated blood pressure  2/23:  Discussed for her to monitor and make sure the blood pressure does not remain elevated as she has had a history of stroke.       Follow up 6-12 months, sooner if needed.       Deborah Collins M.D.  Allergy/Immunology  [facility]   [phone] phone  [phone] fax

## 2023-11-06 NOTE — PATIENT INSTRUCTIONS
Loratadine 2-4 times per day (10 mg tablet)    Benadryl as needed 25 mg    Fluocinolone oil for the face.     Clobetasol shampoo daily - daily to few days per week.   - leave in 10 mins     Sarna as needed for itching.     Doxepin at night as needed for itching.       See Dr. Ramirez dermatology for possible patch test and biopsy.       Try food elimination diet and see if this helps with itching.     Follow up in 6-12 months

## 2023-11-07 ENCOUNTER — TELEPHONE (OUTPATIENT)
Dept: ALLERGY | Facility: CLINIC | Age: 48
End: 2023-11-07

## 2024-01-10 DIAGNOSIS — R60.0 PERIPHERAL EDEMA: Primary | ICD-10-CM

## 2024-01-10 RX ORDER — FUROSEMIDE 40 MG/1
40 TABLET ORAL
Qty: 90 TABLET | Refills: 1 | OUTPATIENT
Start: 2024-01-10

## 2024-01-10 RX ORDER — CARVEDILOL 25 MG/1
TABLET ORAL
Qty: 270 TABLET | OUTPATIENT
Start: 2024-01-10

## 2024-01-11 RX ORDER — FUROSEMIDE 40 MG/1
40 TABLET ORAL DAILY
Qty: 90 TABLET | Refills: 3 | Status: SHIPPED | OUTPATIENT
Start: 2024-01-11

## 2024-03-04 ENCOUNTER — OFFICE VISIT (OUTPATIENT)
Dept: CARDIOLOGY | Facility: CLINIC | Age: 49
End: 2024-03-04
Payer: MEDICARE

## 2024-03-04 VITALS
HEIGHT: 64 IN | BODY MASS INDEX: 31.02 KG/M2 | SYSTOLIC BLOOD PRESSURE: 152 MMHG | WEIGHT: 181.69 LBS | DIASTOLIC BLOOD PRESSURE: 74 MMHG | HEART RATE: 80 BPM

## 2024-03-04 DIAGNOSIS — I10 PRIMARY HYPERTENSION: ICD-10-CM

## 2024-03-04 DIAGNOSIS — D64.9 ANEMIA, UNSPECIFIED TYPE: ICD-10-CM

## 2024-03-04 DIAGNOSIS — Z92.82 S/P ADMN TPA IN DIFF FAC W/N LAST 24 HR BEF ADM TO CRNT FAC: ICD-10-CM

## 2024-03-04 DIAGNOSIS — R79.9 ABNORMAL FINDING OF BLOOD CHEMISTRY, UNSPECIFIED: ICD-10-CM

## 2024-03-04 DIAGNOSIS — I11.0 HYPERTENSIVE HEART DISEASE WITH HEART FAILURE: Primary | ICD-10-CM

## 2024-03-04 DIAGNOSIS — R60.0 PERIPHERAL EDEMA: ICD-10-CM

## 2024-03-04 PROCEDURE — 1160F RVW MEDS BY RX/DR IN RCRD: CPT | Mod: CPTII,S$GLB,, | Performed by: INTERNAL MEDICINE

## 2024-03-04 PROCEDURE — 3078F DIAST BP <80 MM HG: CPT | Mod: CPTII,S$GLB,, | Performed by: INTERNAL MEDICINE

## 2024-03-04 PROCEDURE — 99214 OFFICE O/P EST MOD 30 MIN: CPT | Mod: S$GLB,,, | Performed by: INTERNAL MEDICINE

## 2024-03-04 PROCEDURE — 1159F MED LIST DOCD IN RCRD: CPT | Mod: CPTII,S$GLB,, | Performed by: INTERNAL MEDICINE

## 2024-03-04 PROCEDURE — 3077F SYST BP >= 140 MM HG: CPT | Mod: CPTII,S$GLB,, | Performed by: INTERNAL MEDICINE

## 2024-03-04 PROCEDURE — 3008F BODY MASS INDEX DOCD: CPT | Mod: CPTII,S$GLB,, | Performed by: INTERNAL MEDICINE

## 2024-03-04 PROCEDURE — 4010F ACE/ARB THERAPY RXD/TAKEN: CPT | Mod: CPTII,S$GLB,, | Performed by: INTERNAL MEDICINE

## 2024-03-04 PROCEDURE — 99999 PR PBB SHADOW E&M-EST. PATIENT-LVL IV: CPT | Mod: PBBFAC,,, | Performed by: INTERNAL MEDICINE

## 2024-03-04 NOTE — PROGRESS NOTES
Subjective:    Patient ID:  Mayra Davis is a 48 y.o. female patient here for evaluation Follow-up      History of Present Illness:  Cardiology follow-up.  Remote history of CVA, treated with RT BA stent over last year .  No significant neurologic residual deficits.  Underlying history of hypertension dyslipidemia, blood pressure today reasonably well controlled with multidrug regime.     Noninvasive cardiac assessment - for ischemic or structural heart issues via nuclear perfusion imaging and echo.  Renal artery ultrasound screening for renal artery stenosis -2022.        Review of patient's allergies indicates:   Allergen Reactions    Trazodone Other (See Comments)     Pt says this medication gives her nightmares.     Vancomycin analogues Rash       Past Medical History:   Diagnosis Date    Diabetes mellitus     GERD (gastroesophageal reflux disease)     Hypertension     Legally blind      Past Surgical History:   Procedure Laterality Date     SECTION      Twice    HYSTERECTOMY      TUBAL LIGATION       Social History     Tobacco Use    Smoking status: Never    Smokeless tobacco: Never   Substance Use Topics    Alcohol use: Not Currently    Drug use: Never        Review of Systems:    As noted in HPI in addition      REVIEW OF SYSTEMS  Review of Systems   Constitutional: Negative for decreased appetite, diaphoresis, night sweats, weight gain and weight loss.   HENT:  Negative for nosebleeds and odynophagia.    Eyes:  Negative for double vision and photophobia.   Cardiovascular:  Negative for chest pain, claudication, cyanosis, dyspnea on exertion, irregular heartbeat, leg swelling, near-syncope, orthopnea, palpitations, paroxysmal nocturnal dyspnea and syncope.   Respiratory:  Negative for cough, hemoptysis, shortness of breath and wheezing.    Hematologic/Lymphatic: Negative for adenopathy.   Skin:  Negative for flushing, skin cancer and suspicious lesions.   Musculoskeletal:  Negative for  gout, myalgias and neck pain.   Gastrointestinal:  Negative for abdominal pain, heartburn, hematemesis and hematochezia.   Genitourinary:  Negative for bladder incontinence, hesitancy and nocturia.   Neurological:  Negative for focal weakness, headaches, light-headedness and paresthesias.   Psychiatric/Behavioral:  Negative for memory loss and substance abuse.               Objective:        Vitals:    03/04/24 1000   BP: (!) 152/74   Pulse: 80       Lab Results   Component Value Date    WBC 5.49 06/27/2023    HGB 10.1 (L) 06/27/2023    HCT 30.4 (L) 06/27/2023     06/27/2023    CHOL 155 08/31/2022    TRIG 154 (H) 08/31/2022    HDL 77 (H) 08/31/2022    ALT 58 (H) 06/10/2023    AST 44 (H) 06/10/2023     (L) 08/16/2023    K 4.6 08/16/2023     08/16/2023    CREATININE 1.3 08/16/2023    BUN 23 (H) 08/16/2023    CO2 26 08/16/2023    TSH 3.740 03/20/2023    INR 1.0 08/31/2022    HGBA1C 7.1 (H) 09/01/2022        ECHOCARDIOGRAM RESULTS  Results for orders placed during the hospital encounter of 09/01/22    Echo Saline Bubble? Yes    Interpretation Summary  · The left ventricle is normal in size with moderate concentric hypertrophy and normal systolic function.  · The estimated ejection fraction is 65%.  · Normal left ventricular diastolic function.  · Normal right ventricular size with normal right ventricular systolic function.  · No interatrial septal defect present.  · Mild tricuspid regurgitation.        CURRENT/PREVIOUS VISIT EKG  Results for orders placed or performed during the hospital encounter of 06/10/23   EKG 12-lead    Collection Time: 06/10/23  9:01 PM    Narrative    Test Reason : R07.9,    Vent. Rate : 077 BPM     Atrial Rate : 077 BPM     P-R Int : 196 ms          QRS Dur : 070 ms      QT Int : 396 ms       P-R-T Axes : 054 073 040 degrees     QTc Int : 448 ms    Normal sinus rhythm  Abnormal R wave progression  Abnormal ECG  When compared with ECG of 10-CAROL-2023 20:37,  No significant  "change was found  Confirmed by Josue Mariano MD ([id number]) on 6/11/2023 6:08:31 PM    Referred By: [email or web address]   SELF           Confirmed By:Josue Mariano MD     No valid procedures specified.   Results for orders placed during the hospital encounter of 05/16/22    Nuclear Stress Test    Interpretation Summary    The EKG portion of this study is negative for ischemia.    There were no arrhythmias during stress.    No valid procedures specified.    PHYSICAL EXAM  CONSTITUTIONAL: Well built, well nourished in no apparent distress  NECK: no carotid bruit, no JVD  LUNGS: CTA  CHEST WALL: no tenderness,  HEART: regular rate and rhythm, S1, S2 normal, no murmur, click, rub or gallop   ABDOMEN: soft, non-tender; bowel sounds normal; no masses,  no organomegaly  EXTREMITIES: Extremities normal, no edema, no calf tenderness noted  NEURO: AAO X 3    I HAVE REVIEWED :    The vital signs, nurses notes, and all the pertinent radiology and labs.         Current Outpatient Medications   Medication Instructions    ALPRAZolam (XANAX) 1 mg, Oral, 3 times daily PRN    aspirin 81 mg, Oral, Daily    atropine 1% (ISOPTO ATROPINE) 1 % Drop 1 drop, Right Eye, 2 times daily    augmented betamethasone dipropionate (DIPROLENE-AF) 0.05 % cream APPLY TO SPOT ON BREAST TWICE DAILY    BD ULTRA-FINE MICRO PEN NEEDLE 32 gauge x 1/4" Ndle Subcutaneous    bismuth subsalicylate (BISMAREX) 262 mg Chew 2 tablets, Oral, 4 times daily    busPIRone (BUSPAR) 7.5 mg, Oral, 3 times daily    camphor-menthol 0.5-0.5% (SARNA ORIGINAL) lotion Topical (Top), As needed (PRN)    carvediloL (COREG) 50 mg, Oral, 2 times daily with meals    clobetasoL (CLOBEX) 0.05 % shampoo Topical (Top), Daily    clopidogreL (PLAVIX) 75 mg, Oral, Daily    COMBIGAN 0.2-0.5 % Drop Both Eyes    doxepin (SINEQUAN) 10 MG capsule TAKE 1 CAPSULE BY MOUTH NIGHTLY AS NEEDED (FOR ITCHING). PLEASE STOP MEDICATION IF YOUR BLOOD PRESSURE INCREASES, YOU EXPERIENCE MUSCLE STIFFNESS OR OTHER SIDE EFFECT.    " doxepin (ZONALON) 5 % cream Topical (Top), 4 times daily    fluocinolone (DERMA-SMOOTHE) 0.01 % external oil Topical (Top), 3 times daily, Apply to scalp and ears twice per day    furosemide (LASIX) 40 mg, Oral, Daily    glipiZIDE (GLUCOTROL) 10 mg, Oral, 2 times daily    JANUMET XR  mg TM24 1 tablet, Oral, Daily    ketoconazole (NIZORAL) 2 % shampoo APPLY TWICE A WEEK LEAVE ON SCALP 10 - 15 MIN, THEN RINSE    lamoTRIgine 50 mg TR24 1 tablet, Oral, Daily    loratadine (CLARITIN) 10 mg, Oral, 4 times daily    losartan-hydrochlorothiazide 100-25 mg (HYZAAR) 100-25 mg per tablet 1 tablet, Oral, Daily    metroNIDAZOLE (FLAGYL) 250 mg, Oral, 4 times daily    minocycline (MINOCIN,DYNACIN) 50 mg, Oral, 2 times daily    minoxidiL (LONITEN) 5 mg, Oral, 2 times daily    mometasone (ELOCON) 0.1 % solution APPLY TO AFFECTED AREA twice per day    mometasone 0.1% (ELOCON) 0.1 % cream Topical (Top), Daily    naproxen (NAPROSYN) 500 mg, Oral, 2 times daily    omeprazole (PRILOSEC) 20 mg, Oral, Daily    OXcarbazepine (TRILEPTAL) 150 mg, Oral, 2 times daily    pantoprazole (PROTONIX) 40 mg, Oral, Daily    potassium chloride (MICRO-K) 10 MEQ CpSR 10 mEq, Oral, Daily    pravastatin (PRAVACHOL) 20 mg, Oral    prednisoLONE acetate (PRED FORTE) 1 % DrpS 1 drop, Both Eyes, 4 times daily    QUEtiapine (SEROQUEL) 25 mg, Oral, Nightly    sucralfate (CARAFATE) 1 gram tablet TAKE 1 TABLET (1 GM) BY MOUTH TWICE A DAY    tacrolimus (PROTOPIC) 0.1 % ointment Topical (Top), 2 times daily, Apply to the face twice per day    tetracycline (ACHROMYCIN,SUMYCIN) 500 mg, Oral, 4 times daily    timolol maleate 0.5% (TIMOPTIC) 0.5 % Drop PLACE ONE DROP IN BOTH EYES TWO TIMES DAILY    TRESIBA FLEXTOUCH U-100 100 unit/mL (3 mL) insulin pen Subcutaneous    triamcinolone acetonide 0.1% (KENALOG) 0.1 % ointment APPLY TO SCALP AND BEHIND EARS TWICE DAILY FOR ITCH    valsartan (DIOVAN) 160 mg, Oral, 2 times daily    VITAMIN D3 1,000 Units, Oral, Daily     zolpidem (AMBIEN CR) 12.5 mg, Oral          Assessment:   Hypertensive cardiovascular disease.  Reasonably well controlled on current multidrug regime.  Negative noninvasive cardiac assessment for ischemic or structural heart issues 09/2022.    History of CVA treated with RT PA 2022 no neurologic deficits.    Diabetes mellitus hypertension dyslipidemia.  Remote past tobacco use, quit 6 years ago.      Plan:   Continue Coreg 50 b.i.d., Hyzaar 100/25 daily.  In the pocket 0.1 mg for blood pressures greater than 165.    Diet, weight loss, exercise.    Needs updated labs via CBC, complete metabolic profile, lipid panel and hemoglobin A1c  No follow-ups on file.

## 2024-04-12 ENCOUNTER — HOSPITAL ENCOUNTER (EMERGENCY)
Facility: HOSPITAL | Age: 49
Discharge: HOME | End: 2024-04-12
Attending: EMERGENCY MEDICINE
Payer: MEDICARE

## 2024-04-12 VITALS
TEMPERATURE: 98 F | RESPIRATION RATE: 17 BRPM | HEART RATE: 88 BPM | OXYGEN SATURATION: 97 % | BODY MASS INDEX: 31.18 KG/M2 | SYSTOLIC BLOOD PRESSURE: 143 MMHG | HEIGHT: 64 IN | DIASTOLIC BLOOD PRESSURE: 65 MMHG

## 2024-04-12 DIAGNOSIS — E11.65 TYPE 2 DIABETES MELLITUS WITH HYPERGLYCEMIA, WITHOUT LONG-TERM CURRENT USE OF INSULIN: Primary | ICD-10-CM

## 2024-04-12 DIAGNOSIS — R73.9 HYPERGLYCEMIA: ICD-10-CM

## 2024-04-12 LAB
ALBUMIN SERPL BCP-MCNC: 3.3 G/DL (ref 3.5–5.2)
ALP SERPL-CCNC: 138 U/L (ref 55–135)
ALT SERPL W/O P-5'-P-CCNC: 12 U/L (ref 10–44)
ANION GAP SERPL CALC-SCNC: 9 MMOL/L (ref 8–16)
ANION GAP SERPL CALC-SCNC: 9 MMOL/L (ref 8–16)
AST SERPL-CCNC: 14 U/L (ref 10–40)
BACTERIA #/AREA URNS AUTO: ABNORMAL /HPF
BASOPHILS # BLD AUTO: 0.07 K/UL (ref 0–0.2)
BASOPHILS NFR BLD: 1.1 % (ref 0–1.9)
BILIRUB SERPL-MCNC: 0.2 MG/DL (ref 0.1–1)
BILIRUB UR QL STRIP: NEGATIVE
BUN SERPL-MCNC: 29 MG/DL (ref 6–20)
BUN SERPL-MCNC: 30 MG/DL (ref 6–20)
CALCIUM SERPL-MCNC: 9.3 MG/DL (ref 8.7–10.5)
CALCIUM SERPL-MCNC: 9.7 MG/DL (ref 8.7–10.5)
CHLORIDE SERPL-SCNC: 105 MMOL/L (ref 95–110)
CHLORIDE SERPL-SCNC: 98 MMOL/L (ref 95–110)
CLARITY UR REFRACT.AUTO: CLEAR
CO2 SERPL-SCNC: 22 MMOL/L (ref 23–29)
CO2 SERPL-SCNC: 23 MMOL/L (ref 23–29)
COLOR UR AUTO: ABNORMAL
CREAT SERPL-MCNC: 1.5 MG/DL (ref 0.5–1.4)
CREAT SERPL-MCNC: 1.6 MG/DL (ref 0.5–1.4)
DIFFERENTIAL METHOD BLD: ABNORMAL
EOSINOPHIL # BLD AUTO: 0.1 K/UL (ref 0–0.5)
EOSINOPHIL NFR BLD: 1.2 % (ref 0–8)
ERYTHROCYTE [DISTWIDTH] IN BLOOD BY AUTOMATED COUNT: 11.5 % (ref 11.5–14.5)
EST. GFR  (NO RACE VARIABLE): 39.5 ML/MIN/1.73 M^2
EST. GFR  (NO RACE VARIABLE): 42.7 ML/MIN/1.73 M^2
ESTIMATED AVG GLUCOSE: ABNORMAL MG/DL (ref 68–131)
GLUCOSE SERPL-MCNC: 214 MG/DL (ref 70–110)
GLUCOSE SERPL-MCNC: 625 MG/DL (ref 70–110)
GLUCOSE UR QL STRIP: ABNORMAL
HBA1C MFR BLD: >14 % (ref 4–5.6)
HCT VFR BLD AUTO: 36.7 % (ref 37–48.5)
HCV AB SERPL QL IA: NORMAL
HGB BLD-MCNC: 12.4 G/DL (ref 12–16)
HGB UR QL STRIP: NEGATIVE
HIV 1+2 AB+HIV1 P24 AG SERPL QL IA: NORMAL
HYALINE CASTS UR QL AUTO: 0 /LPF
IMM GRANULOCYTES # BLD AUTO: 0.04 K/UL (ref 0–0.04)
IMM GRANULOCYTES NFR BLD AUTO: 0.6 % (ref 0–0.5)
KETONES UR QL STRIP: NEGATIVE
LEUKOCYTE ESTERASE UR QL STRIP: NEGATIVE
LIPASE SERPL-CCNC: 116 U/L (ref 4–60)
LYMPHOCYTES # BLD AUTO: 2.9 K/UL (ref 1–4.8)
LYMPHOCYTES NFR BLD: 43.4 % (ref 18–48)
MAGNESIUM SERPL-MCNC: 1.9 MG/DL (ref 1.6–2.6)
MCH RBC QN AUTO: 29.6 PG (ref 27–31)
MCHC RBC AUTO-ENTMCNC: 33.8 G/DL (ref 32–36)
MCV RBC AUTO: 88 FL (ref 82–98)
MICROSCOPIC COMMENT: ABNORMAL
MONOCYTES # BLD AUTO: 0.4 K/UL (ref 0.3–1)
MONOCYTES NFR BLD: 6.6 % (ref 4–15)
NEUTROPHILS # BLD AUTO: 3.1 K/UL (ref 1.8–7.7)
NEUTROPHILS NFR BLD: 47.1 % (ref 38–73)
NITRITE UR QL STRIP: NEGATIVE
NRBC BLD-RTO: 0 /100 WBC
OSMOLALITY SERPL: 316 MOSM/KG (ref 275–295)
PH UR STRIP: 5 [PH] (ref 5–8)
PHOSPHATE SERPL-MCNC: 2.2 MG/DL (ref 2.7–4.5)
PLATELET # BLD AUTO: 246 K/UL (ref 150–450)
PMV BLD AUTO: 11.1 FL (ref 9.2–12.9)
POCT GLUCOSE: 209 MG/DL (ref 70–110)
POCT GLUCOSE: >500 MG/DL (ref 70–110)
POCT GLUCOSE: >500 MG/DL (ref 70–110)
POTASSIUM SERPL-SCNC: 3.9 MMOL/L (ref 3.5–5.1)
POTASSIUM SERPL-SCNC: 4.2 MMOL/L (ref 3.5–5.1)
PROT SERPL-MCNC: 7.3 G/DL (ref 6–8.4)
PROT UR QL STRIP: ABNORMAL
RBC # BLD AUTO: 4.19 M/UL (ref 4–5.4)
RBC #/AREA URNS AUTO: 1 /HPF (ref 0–4)
SODIUM SERPL-SCNC: 129 MMOL/L (ref 136–145)
SODIUM SERPL-SCNC: 137 MMOL/L (ref 136–145)
SP GR UR STRIP: 1.01 (ref 1–1.03)
SQUAMOUS #/AREA URNS AUTO: 0 /HPF
TROPONIN I SERPL DL<=0.01 NG/ML-MCNC: <0.006 NG/ML (ref 0–0.03)
TSH SERPL DL<=0.005 MIU/L-ACNC: 1.79 UIU/ML (ref 0.4–4)
URN SPEC COLLECT METH UR: ABNORMAL
WBC # BLD AUTO: 6.63 K/UL (ref 3.9–12.7)
WBC #/AREA URNS AUTO: 1 /HPF (ref 0–5)
YEAST UR QL AUTO: ABNORMAL

## 2024-04-12 PROCEDURE — 96366 THER/PROPH/DIAG IV INF ADDON: CPT

## 2024-04-12 PROCEDURE — 93005 ELECTROCARDIOGRAM TRACING: CPT

## 2024-04-12 PROCEDURE — 85025 COMPLETE CBC W/AUTO DIFF WBC: CPT

## 2024-04-12 PROCEDURE — 96361 HYDRATE IV INFUSION ADD-ON: CPT

## 2024-04-12 PROCEDURE — 83930 ASSAY OF BLOOD OSMOLALITY: CPT

## 2024-04-12 PROCEDURE — 63600175 PHARM REV CODE 636 W HCPCS

## 2024-04-12 PROCEDURE — 81001 URINALYSIS AUTO W/SCOPE: CPT

## 2024-04-12 PROCEDURE — 99285 EMERGENCY DEPT VISIT HI MDM: CPT | Mod: 25

## 2024-04-12 PROCEDURE — 84100 ASSAY OF PHOSPHORUS: CPT

## 2024-04-12 PROCEDURE — 80048 BASIC METABOLIC PNL TOTAL CA: CPT | Mod: XB

## 2024-04-12 PROCEDURE — 84443 ASSAY THYROID STIM HORMONE: CPT

## 2024-04-12 PROCEDURE — 25000003 PHARM REV CODE 250

## 2024-04-12 PROCEDURE — 87389 HIV-1 AG W/HIV-1&-2 AB AG IA: CPT | Performed by: PHYSICIAN ASSISTANT

## 2024-04-12 PROCEDURE — 86803 HEPATITIS C AB TEST: CPT | Performed by: PHYSICIAN ASSISTANT

## 2024-04-12 PROCEDURE — 96375 TX/PRO/DX INJ NEW DRUG ADDON: CPT

## 2024-04-12 PROCEDURE — 80053 COMPREHEN METABOLIC PANEL: CPT

## 2024-04-12 PROCEDURE — 96365 THER/PROPH/DIAG IV INF INIT: CPT

## 2024-04-12 PROCEDURE — 93010 ELECTROCARDIOGRAM REPORT: CPT | Mod: ,,, | Performed by: INTERNAL MEDICINE

## 2024-04-12 PROCEDURE — 82962 GLUCOSE BLOOD TEST: CPT

## 2024-04-12 PROCEDURE — 83690 ASSAY OF LIPASE: CPT

## 2024-04-12 PROCEDURE — 83735 ASSAY OF MAGNESIUM: CPT

## 2024-04-12 PROCEDURE — 83036 HEMOGLOBIN GLYCOSYLATED A1C: CPT

## 2024-04-12 PROCEDURE — 84484 ASSAY OF TROPONIN QUANT: CPT

## 2024-04-12 RX ORDER — METHOCARBAMOL 500 MG/1
500 TABLET, FILM COATED ORAL
Status: COMPLETED | OUTPATIENT
Start: 2024-04-12 | End: 2024-04-12

## 2024-04-12 RX ORDER — MAGNESIUM SULFATE HEPTAHYDRATE 40 MG/ML
2 INJECTION, SOLUTION INTRAVENOUS ONCE
Status: COMPLETED | OUTPATIENT
Start: 2024-04-12 | End: 2024-04-12

## 2024-04-12 RX ORDER — SODIUM CHLORIDE 9 MG/ML
100 INJECTION, SOLUTION INTRAVENOUS CONTINUOUS
Status: DISCONTINUED | OUTPATIENT
Start: 2024-04-12 | End: 2024-04-12

## 2024-04-12 RX ORDER — SODIUM,POTASSIUM PHOSPHATES 280-250MG
2 POWDER IN PACKET (EA) ORAL ONCE
Status: COMPLETED | OUTPATIENT
Start: 2024-04-12 | End: 2024-04-12

## 2024-04-12 RX ADMIN — INSULIN HUMAN 10 UNITS: 100 INJECTION, SOLUTION PARENTERAL at 06:04

## 2024-04-12 RX ADMIN — METHOCARBAMOL 500 MG: 500 TABLET ORAL at 07:04

## 2024-04-12 RX ADMIN — SODIUM CHLORIDE, POTASSIUM CHLORIDE, SODIUM LACTATE AND CALCIUM CHLORIDE 1000 ML: 600; 310; 30; 20 INJECTION, SOLUTION INTRAVENOUS at 05:04

## 2024-04-12 RX ADMIN — MAGNESIUM SULFATE HEPTAHYDRATE 2 G: 40 INJECTION, SOLUTION INTRAVENOUS at 08:04

## 2024-04-12 RX ADMIN — POTASSIUM & SODIUM PHOSPHATES POWDER PACK 280-160-250 MG 2 PACKET: 280-160-250 PACK at 07:04

## 2024-04-12 NOTE — ED PROVIDER NOTES
"Encounter Date: 2024       History     Chief Complaint   Patient presents with    Hyperglycemia     Arrived from [place] stating "the doctor told me to come to the ED immediately because my blood sugar is really high and my sodium is low."     HPI 48F hx severe uncontrolled HTN, T2DM c/b BL diabetic retinopathy w/ legally blind status, remote hx CVA, MI and GERD presented d/t hyperglycemia noted on outpt lab values. She reports home BG reading of >500 noted at home last night, so she contacted her PCP in [place] who told her to present to the ED. She endorses associated 2d hx confusion, nausea and epigastric pain. Also reports recent pain and tingling in her legs she describes as pins-and-needles to touch. Denies HA, LOC, dizziness, lightheadedness, CP, palpitations, cough, SOB, vomiting, diarrhea. In ED pt hypertensive otherwise VSS on RA. See MDM for clinical decision making.    Review of patient's allergies indicates:   Allergen Reactions    Trazodone Other (See Comments)     Pt says this medication gives her nightmares.     Vancomycin analogues Rash     Past Medical History:   Diagnosis Date    Diabetes mellitus     GERD (gastroesophageal reflux disease)     Hypertension     Legally blind      Past Surgical History:   Procedure Laterality Date     SECTION      Twice    HYSTERECTOMY      TUBAL LIGATION       No family history on file.  Social History     Tobacco Use    Smoking status: Never    Smokeless tobacco: Never   Substance Use Topics    Alcohol use: Not Currently    Drug use: Never         Physical Exam     Initial Vitals [24 1444]   BP Pulse Resp Temp SpO2   (!) 184/81 92 18 98.3 °F (36.8 °C) 99 %      MAP       --         Physical Exam    Constitutional: She appears well-nourished. No distress.   HENT:   Head: Normocephalic and atraumatic.   Eyes: EOM are normal. Pupils are equal, round, and reactive to light.   Neck:   Normal range of motion.  Cardiovascular:  Regular rhythm.   " Tachycardia present.         Murmur heard.  Pulmonary/Chest: Breath sounds normal. No respiratory distress. She has no wheezes. She has no rales.   Abdominal: Abdomen is soft. There is abdominal tenderness.   Epigastric tenderness to deep palpation   Musculoskeletal:         General: Tenderness present. No edema. Normal range of motion.      Cervical back: Normal range of motion.      Comments: Pins and needles sensation to palpation on BL lower extremities     Neurological: She is alert.   Oriented to person, place, not time  Short term memory deficits  No focal neurological deficits  PERRL, EOMI  GCS 15  Str 5/5 all extremities  Sensation mildly decreased in feet   Skin: Skin is warm and dry. No rash noted. No erythema.   Psychiatric: Her behavior is normal.         ED Course   Procedures  Labs Reviewed   POCT GLUCOSE - Abnormal; Notable for the following components:       Result Value    POCT Glucose >500 (*)     All other components within normal limits   HIV 1 / 2 ANTIBODY   HEPATITIS C ANTIBODY   CBC W/ AUTO DIFFERENTIAL   COMPREHENSIVE METABOLIC PANEL   URINALYSIS, REFLEX TO URINE CULTURE   LIPASE   TROPONIN I   TSH   HEMOGLOBIN A1C   POCT GLUCOSE MONITORING CONTINUOUS     EKG Readings: (Independently Interpreted)   Initial Reading: No STEMI. Previous EKG: Compared with most recent EKG Rhythm: Normal Sinus Rhythm.       Imaging Results    None          Medications   lactated ringers bolus 1,000 mL (has no administration in time range)     Medical Decision Making  48F hx poorly controlled T2DM p/w hyperglycemia + AMS. Exam sig for disorientation, epigastric tenderness to palpation. EKG wnl, routine hyperglycemia labs ordered, cardiac labs ordered + lipase to evaluate epigastric pain. CXR pending. Suspect hyperglycemia 2/2 poorly controlled diabetes, ddx = DKA vs HHS.    Significant improvement with 10u insulin. IVF given, electrolytes replaced. 1 dose methocarbamol given for muscle spasms. Discussed discharge  with patient and need for close follow up with PCP. Referral sent to establish care with family practice. Return precautions discussed. Patient comfortable with plan of care.     Amount and/or Complexity of Data Reviewed  External Data Reviewed: labs and radiology.  Labs: ordered. Decision-making details documented in ED Course.  Radiology: ordered and independent interpretation performed.  ECG/medicine tests: ordered and independent interpretation performed.    Risk  OTC drugs.  Prescription drug management.               ED Course as of 04/12/24 2128 Fri Apr 12, 2024   1821 Ketones, UA: Negative [RH]   1821 CO2(!): 22 [RH]   1821 Glucose(!!): 625 [RH]   1821 Anion Gap: 9 [RH]   1821 No evidence of ketosis or metabolic acidosis. Calculated serum osmolality 305. Na corrects to 137. Will give 10u insulin with IVF. Reassess glucose in 1 hour.    [RH]   2041 Repeat glucose 209.  Patient asymptomatic.  Safe to discharge this time.  Counseled not to take next dose of Tresiba given that her glucose is lower than her baseline [DS]      ED Course User Index  [DS] Diego Woods MD  [RH] Abiodun Ledesma MD                           Clinical Impression:  Final diagnoses:  [R73.9] Hyperglycemia                 Abiodun Ledesma MD  Resident  04/12/24 2130

## 2024-04-13 LAB
OHS QRS DURATION: 72 MS
OHS QTC CALCULATION: 437 MS

## 2024-04-13 NOTE — ED TRIAGE NOTES
Patient identifiers for Mayra GOMEZ Blocker checked and correct.  Pt was told to come to ED for hyperglycemia and low sodium pt states that she hasn't been as compliant since husbands death.   LOC: The patient is awake, alert and aware of environment with an appropriate affect, the patient is oriented x 4 and speaking appropriately.    APPEARANCE: Patient resting comfortably and in no acute distress, patient is clean and well groomed, patient's clothing is properly fastened.    SKIN: The skin is warm and dry, color consistent with ethnicity, patient has normal skin turgor and moist mucus membranes, skin intact, no breakdown or bruising noted.    MUSCULOSKELETAL: Patient moving all extremities well, no obvious swelling or deformities noted.    RESPIRATORY: Airway is open and patent, respirations are spontaneous and even, patient has a normal effort and rate.    CARDIAC: Patient has a normal rate and rhythm, no periphreal edema noted, capillary refill < 3 seconds.    ABDOMEN: Soft and non tender to palpation, no distention noted. Patient denies any nausea, vomiting, diarrhea, or constipation.     NEUROLOGIC: Eyes open spontaneously, PERRL, behavior appropriate to situation, follows commands, facial expression symmetrical, bilateral hand grasp equal and even, purposeful motor response noted, normal sensation in all extremities.     HEENT: No abnormalities noted. White sclera and pupils equal round and reactive to light. Denies headache, dizziness.     : Pt voids independently, denies dysuria, hematuria, frequency.

## 2024-04-13 NOTE — DISCHARGE INSTRUCTIONS
-please continue to use all of your oral diabetic medications as directed  -you have been referred to family practice physician for hospital follow up and to establish care. Please be sure to attend this appointment to get better control of your blood sugar. Please continue to check your blood sugars at home. If you see high values again (over 400), please return to the nearest emergency room.

## 2024-07-12 DIAGNOSIS — I10 HYPERTENSION, UNSPECIFIED TYPE: ICD-10-CM

## 2024-07-12 DIAGNOSIS — I10 PRIMARY HYPERTENSION: Primary | ICD-10-CM

## 2024-07-15 RX ORDER — VALSARTAN 160 MG/1
160 TABLET ORAL 2 TIMES DAILY
Qty: 180 TABLET | Refills: 2 | Status: SHIPPED | OUTPATIENT
Start: 2024-07-15

## 2024-07-18 RX ORDER — MINOXIDIL 2.5 MG/1
5 TABLET ORAL 2 TIMES DAILY
Qty: 360 TABLET | Refills: 2 | Status: SHIPPED | OUTPATIENT
Start: 2024-07-18 | End: 2025-07-18

## 2024-08-16 ENCOUNTER — TELEPHONE (OUTPATIENT)
Dept: CARDIOLOGY | Facility: CLINIC | Age: 49
End: 2024-08-16
Payer: MEDICARE

## 2024-08-31 DIAGNOSIS — I10 PRIMARY HYPERTENSION: ICD-10-CM

## 2024-09-03 RX ORDER — LOSARTAN POTASSIUM AND HYDROCHLOROTHIAZIDE 25; 100 MG/1; MG/1
1 TABLET ORAL
Qty: 90 TABLET | Refills: 3 | Status: SHIPPED | OUTPATIENT
Start: 2024-09-03

## 2024-10-31 ENCOUNTER — OFFICE VISIT (OUTPATIENT)
Dept: CARDIOLOGY | Facility: CLINIC | Age: 49
End: 2024-10-31
Payer: MEDICARE

## 2024-10-31 VITALS
DIASTOLIC BLOOD PRESSURE: 78 MMHG | SYSTOLIC BLOOD PRESSURE: 154 MMHG | HEART RATE: 74 BPM | BODY MASS INDEX: 32.6 KG/M2 | WEIGHT: 190.94 LBS | HEIGHT: 64 IN

## 2024-10-31 DIAGNOSIS — R60.0 PERIPHERAL EDEMA: ICD-10-CM

## 2024-10-31 DIAGNOSIS — D64.9 ANEMIA, UNSPECIFIED TYPE: ICD-10-CM

## 2024-10-31 DIAGNOSIS — I10 PRIMARY HYPERTENSION: Primary | ICD-10-CM

## 2024-10-31 DIAGNOSIS — L30.9 DERMATITIS: ICD-10-CM

## 2024-10-31 DIAGNOSIS — I11.0 HYPERTENSIVE HEART DISEASE WITH HEART FAILURE: ICD-10-CM

## 2024-10-31 DIAGNOSIS — Z92.82 S/P ADMN TPA IN DIFF FAC W/N LAST 24 HR BEF ADM TO CRNT FAC: ICD-10-CM

## 2024-10-31 DIAGNOSIS — L29.9 PRURITUS: ICD-10-CM

## 2024-10-31 PROCEDURE — 4010F ACE/ARB THERAPY RXD/TAKEN: CPT | Mod: CPTII,S$GLB,, | Performed by: INTERNAL MEDICINE

## 2024-10-31 PROCEDURE — 3078F DIAST BP <80 MM HG: CPT | Mod: CPTII,S$GLB,, | Performed by: INTERNAL MEDICINE

## 2024-10-31 PROCEDURE — 99999 PR PBB SHADOW E&M-EST. PATIENT-LVL IV: CPT | Mod: PBBFAC,,, | Performed by: INTERNAL MEDICINE

## 2024-10-31 PROCEDURE — 99214 OFFICE O/P EST MOD 30 MIN: CPT | Mod: S$GLB,,, | Performed by: INTERNAL MEDICINE

## 2024-10-31 PROCEDURE — 3077F SYST BP >= 140 MM HG: CPT | Mod: CPTII,S$GLB,, | Performed by: INTERNAL MEDICINE

## 2024-10-31 PROCEDURE — 1159F MED LIST DOCD IN RCRD: CPT | Mod: CPTII,S$GLB,, | Performed by: INTERNAL MEDICINE

## 2024-10-31 PROCEDURE — 3008F BODY MASS INDEX DOCD: CPT | Mod: CPTII,S$GLB,, | Performed by: INTERNAL MEDICINE

## 2024-10-31 PROCEDURE — 3046F HEMOGLOBIN A1C LEVEL >9.0%: CPT | Mod: CPTII,S$GLB,, | Performed by: INTERNAL MEDICINE

## 2024-10-31 RX ORDER — CARVEDILOL 25 MG/1
50 TABLET ORAL 2 TIMES DAILY WITH MEALS
Qty: 180 TABLET | Refills: 1 | Status: SHIPPED | OUTPATIENT
Start: 2024-10-31 | End: 2025-10-31

## 2024-10-31 RX ORDER — CLOPIDOGREL BISULFATE 75 MG/1
75 TABLET ORAL DAILY
Qty: 90 TABLET | Refills: 1 | Status: SHIPPED | OUTPATIENT
Start: 2024-10-31 | End: 2025-10-31

## 2025-01-29 DIAGNOSIS — I10 PRIMARY HYPERTENSION: ICD-10-CM

## 2025-01-29 RX ORDER — CARVEDILOL 25 MG/1
TABLET ORAL
Qty: 360 TABLET | Refills: 0 | Status: SHIPPED | OUTPATIENT
Start: 2025-01-29

## 2025-04-18 NOTE — TELEPHONE ENCOUNTER
LM to schedule/jf 12/2/22   Level of Care: Telemetry [5]   Diagnosis: Toxic metabolic encephalopathy [[id number]]   Admitting Physician: FABRICE ALANIS [[id number]]   Attending Physician: FABRICE ALANIS [[id number]]